# Patient Record
Sex: FEMALE | Race: WHITE | NOT HISPANIC OR LATINO | Employment: PART TIME | URBAN - METROPOLITAN AREA
[De-identification: names, ages, dates, MRNs, and addresses within clinical notes are randomized per-mention and may not be internally consistent; named-entity substitution may affect disease eponyms.]

---

## 2020-07-02 ENCOUNTER — TELEPHONE (OUTPATIENT)
Dept: ORTHOPEDICS | Facility: CLINIC | Age: 55
End: 2020-07-02

## 2020-07-02 DIAGNOSIS — M25.512 LEFT SHOULDER PAIN, UNSPECIFIED CHRONICITY: Primary | ICD-10-CM

## 2020-07-02 NOTE — TELEPHONE ENCOUNTER
Attempted to call patient on both numbers. Listed Mobile number is a wrong number and the Home number continuously rang and hung up.

## 2020-07-13 ENCOUNTER — TELEPHONE (OUTPATIENT)
Dept: ORTHOPEDICS | Facility: CLINIC | Age: 55
End: 2020-07-13

## 2020-07-13 NOTE — TELEPHONE ENCOUNTER
Called pt to reschedule her apt, I also reminded her to come early for her xr apt, pt understood.          ----- Message from Ambrose Huddleston sent at 7/13/2020  8:24 AM CDT -----  Regarding: [phone]  Pt would like a call back in regards to rescheduling her appt. Please call back at [phone].         Thank You,   Ambrose Huddleston

## 2020-08-17 ENCOUNTER — OFFICE VISIT (OUTPATIENT)
Dept: ORTHOPEDICS | Facility: CLINIC | Age: 55
End: 2020-08-17
Payer: MEDICAID

## 2020-08-17 ENCOUNTER — HOSPITAL ENCOUNTER (OUTPATIENT)
Dept: RADIOLOGY | Facility: HOSPITAL | Age: 55
Discharge: HOME | End: 2020-08-17
Attending: ORTHOPAEDIC SURGERY
Payer: MEDICAID

## 2020-08-17 VITALS
DIASTOLIC BLOOD PRESSURE: 81 MMHG | BODY MASS INDEX: 21 KG/M2 | WEIGHT: 123 LBS | HEIGHT: 64 IN | SYSTOLIC BLOOD PRESSURE: 117 MMHG | HEART RATE: 81 BPM

## 2020-08-17 DIAGNOSIS — M19.012 GLENOHUMERAL ARTHRITIS, LEFT: Primary | ICD-10-CM

## 2020-08-17 DIAGNOSIS — M25.512 LEFT SHOULDER PAIN, UNSPECIFIED CHRONICITY: ICD-10-CM

## 2020-08-17 DIAGNOSIS — M54.12 CERVICAL RADICULOPATHY: ICD-10-CM

## 2020-08-17 DIAGNOSIS — M54.12 CERVICAL RADICULOPATHY: Primary | ICD-10-CM

## 2020-08-17 PROCEDURE — 73030 X-RAY EXAM OF SHOULDER: CPT | Mod: TC,LT

## 2020-08-17 PROCEDURE — 99213 OFFICE O/P EST LOW 20 MIN: CPT | Mod: PBBFAC,25 | Performed by: ORTHOPAEDIC SURGERY

## 2020-08-17 PROCEDURE — 99999 PR PBB SHADOW E&M-EST. PATIENT-LVL III: CPT | Mod: PBBFAC,,, | Performed by: ORTHOPAEDIC SURGERY

## 2020-08-17 PROCEDURE — 99999 PR PBB SHADOW E&M-EST. PATIENT-LVL III: ICD-10-PCS | Mod: PBBFAC,,, | Performed by: ORTHOPAEDIC SURGERY

## 2020-08-17 PROCEDURE — 99203 PR OFFICE/OUTPT VISIT, NEW, LEVL III, 30-44 MIN: ICD-10-PCS | Mod: S$PBB,,, | Performed by: ORTHOPAEDIC SURGERY

## 2020-08-17 PROCEDURE — 73030 XR SHOULDER COMPLETE 2 OR MORE VIEWS LEFT: ICD-10-PCS | Mod: 26,LT,, | Performed by: RADIOLOGY

## 2020-08-17 PROCEDURE — 99203 OFFICE O/P NEW LOW 30 MIN: CPT | Mod: S$PBB,,, | Performed by: ORTHOPAEDIC SURGERY

## 2020-08-17 PROCEDURE — 73030 X-RAY EXAM OF SHOULDER: CPT | Mod: 26,LT,, | Performed by: RADIOLOGY

## 2020-08-17 RX ORDER — MOMETASONE FUROATE 50 UG/1
2 SPRAY, METERED NASAL
COMMUNITY
End: 2021-01-14 | Stop reason: CLARIF

## 2020-08-17 RX ORDER — LIDOCAINE AND PRILOCAINE 25; 25 MG/G; MG/G
CREAM TOPICAL
COMMUNITY
Start: 2016-05-27
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
## 2020-08-17 NOTE — PROGRESS NOTES
Patient ID: Radha Ramcahandran  YOB: 1965  MRN: [id number]    Chief Complaint: No chief complaint on file.    Referred By: Started looking up doctors.    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    Shoulder Pain   The pain is present in the left shoulder. The pain radiates to the left neck, right neck, left arm, left forearm and left hand. The current episode started more than 1 month ago. The problem occurs constantly. The problem has been gradually worsening. The quality of the pain is described as sharp and shooting. The pain is at a severity of 8/10. Associated symptoms include joint swelling, a limited range of motion, numbness and tingling. Pertinent negatives include no fever or itching. The symptoms are aggravated by lifting, activity, exercise, lying down, bearing weight and rotation. She has tried NSAIDs and OTC pain meds for the symptoms. The treatment provided mild relief. Physical therapy was not tried.      Past Medical History:   No past medical history on file.  No past surgical history on file.  No family history on file.  Social History     Socioeconomic History    Marital status: Single     Spouse name: Not on file    Number of children: Not on file    Years of education: Not on file    Highest education level: Not on file   Occupational History    Not on file   Social Needs    Financial resource strain: Not on file    Food insecurity     Worry: Not on file     Inability: Not on file    Transportation needs     Medical: Not on file     Non-medical: Not on file   Tobacco Use    Smoking status: Not on file   Substance and Sexual Activity    Alcohol use: Not on file    Drug use: Not on file    Sexual activity: Not on file   Lifestyle    Physical activity     Days per week: Not on file     Minutes per session: Not on file    Stress: Not on file   Relationships    Social  connections     Talks on phone: Not on file     Gets together: Not on file     Attends Religion service: Not on file     Active member of club or organization: Not on file     Attends meetings of clubs or organizations: Not on file     Relationship status: Not on file   Other Topics Concern    Not on file   Social History Narrative    Not on file       Review of patient's allergies indicates:  Not on File  Review of Systems   Constitution: Negative for fever.   HENT: Negative for sore throat.    Eyes: Negative for blurred vision.   Cardiovascular: Negative for dyspnea on exertion.   Respiratory: Negative for shortness of breath.    Hematologic/Lymphatic: Does not bruise/bleed easily.   Skin: Negative for itching.   Musculoskeletal: Positive for joint pain, joint swelling and neck pain.   Gastrointestinal: Negative for vomiting.   Genitourinary: Negative for dysuria.   Neurological: Positive for numbness and tingling. Negative for dizziness.   Psychiatric/Behavioral: The patient does not have insomnia.        Physical Exam:   There is no height or weight on file to calculate BMI.  There were no vitals filed for this visit.   GENERAL: Well appearing, appropriate for stated age, no acute distress.  CARDIOVASCULAR: Pulses regular by peripheral palpation.  PULMONARY: Respirations are even and non-labored.  NEURO: Awake, alert, and oriented x 3.  PSYCH: Mood & affect are appropriate.  HEENT: Head is normocephalic and atraumatic.  Ortho/SPM Exam  ***    Imaging:    No image results found.    ***  Relevant imaging results reviewed and interpreted by me, discussed with the patient and / or family today. ***    Other Tests:     No other tests performed today.***    Assessment:  Radha Ramachandran is a 55 y.o. female ***   ***    No diagnosis found.     Plan:   ***   Treatment plan was developed with input from the patient/family, and they expressed understanding and agreement with the plan. All questions were answered  today.    Follow-up: *** or sooner if there are any problems between now and then.    Disclaimer: This note was prepared using a voice recognition system and is likely to have sound alike errors within the text.
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
## 2020-08-19 ENCOUNTER — PROCEDURE VISIT (OUTPATIENT)
Dept: ORTHOPEDICS | Facility: CLINIC | Age: 55
End: 2020-08-19
Payer: MEDICAID

## 2020-08-19 VITALS
HEART RATE: 76 BPM | WEIGHT: 123 LBS | BODY MASS INDEX: 21 KG/M2 | SYSTOLIC BLOOD PRESSURE: 121 MMHG | DIASTOLIC BLOOD PRESSURE: 82 MMHG | HEIGHT: 64 IN

## 2020-08-19 DIAGNOSIS — M19.012 GLENOHUMERAL ARTHRITIS, LEFT: Primary | ICD-10-CM

## 2020-08-19 PROCEDURE — 20611 LARGE JOINT ASPIRATION/INJECTION: L GLENOHUMERAL: ICD-10-PCS | Mod: S$PBB,LT,, | Performed by: ORTHOPAEDIC SURGERY

## 2020-08-19 PROCEDURE — 99499 NO LOS: ICD-10-PCS | Mod: S$PBB,,, | Performed by: ORTHOPAEDIC SURGERY

## 2020-08-19 PROCEDURE — 20611 DRAIN/INJ JOINT/BURSA W/US: CPT | Mod: PBBFAC,LT | Performed by: ORTHOPAEDIC SURGERY

## 2020-08-19 PROCEDURE — 99499 UNLISTED E&M SERVICE: CPT | Mod: S$PBB,,, | Performed by: ORTHOPAEDIC SURGERY

## 2020-08-19 RX ADMIN — METHYLPREDNISOLONE ACETATE 80 MG: 80 INJECTION, SUSPENSION INTRALESIONAL; INTRAMUSCULAR; INTRASYNOVIAL; SOFT TISSUE at 09:08

## 2020-08-19 NOTE — LETTER
August 19, 2020      [facility]  [address]  [place] [address]  Phone: [phone]  Fax: [phone]       Patient: Radha Ramachandran   YOB: 1965  Date of Visit: 08/19/2020    To Whom It May Concern:    Alonzo Ramachandran  was at [facility] on 08/19/2020.  Patient will be unable to do any heavy lifting and/or overhead lifting until 08/20/2020.  Please excuse from work on 08/19/2020 due to appt.     If you have any questions or concerns, or if I can be of further assistance, please do not hesitate to contact me.    Sincerely,    Toi Bradford MD // Torrie Metz MA

## 2020-08-20 RX ORDER — METHYLPREDNISOLONE ACETATE 80 MG/ML
80 INJECTION, SUSPENSION INTRA-ARTICULAR; INTRALESIONAL; INTRAMUSCULAR; SOFT TISSUE
Status: DISCONTINUED | OUTPATIENT
Start: 2020-08-19 | End: 2020-08-20 | Stop reason: HOSPADM

## 2020-08-20 NOTE — PROCEDURES
Large Joint Aspiration/Injection: L glenohumeral    Date/Time: 8/19/2020 9:00 AM  Performed by: Toi Bradford MD  Authorized by: Toi Bradford MD     Consent Done?:  Yes (Verbal)  Indications:  Diagnostic evaluation and pain  Site marked: the procedure site was marked    Timeout: prior to procedure the correct patient, procedure, and site was verified    Prep: patient was prepped and draped in usual sterile fashion      Local anesthesia used?: Yes    Local anesthetic:  Bupivacaine 0.5% without epinephrine, lidocaine 1% without epinephrine and topical anesthetic    Details:  Needle Size:  20 G  Ultrasonic Guidance for needle placement?: Yes    Images are saved and documented.  Approach:  Posterior  Location:  Shoulder  Site:  L glenohumeral  Medications:  80 mg methylPREDNISolone acetate 80 mg/mL  Patient tolerance:  Patient tolerated the procedure well with no immediate complications     Ultrasound Guided  2cc 1% lidocaine plain, 2cc 0.5% marcaine plain, 1cc 80mg methylprednisolone    ULTRASOUND NOTE: Due to the complex nature of the anatomy, and the need to insure the needle was placed precisely at the desired anatomical location, this procedure was performed with the assistance of ultrasound guidance. Imaging demonstrating needle trajectory and placement was saved on the ultrasound device for documentation purposes.

## 2020-08-31 ENCOUNTER — OFFICE VISIT (OUTPATIENT)
Dept: ORTHOPEDICS | Facility: CLINIC | Age: 55
End: 2020-08-31
Payer: MEDICAID

## 2020-08-31 ENCOUNTER — HOSPITAL ENCOUNTER (OUTPATIENT)
Dept: RADIOLOGY | Facility: HOSPITAL | Age: 55
Discharge: HOME | End: 2020-08-31
Attending: ORTHOPAEDIC SURGERY
Payer: MEDICAID

## 2020-08-31 VITALS
HEART RATE: 75 BPM | HEIGHT: 64 IN | DIASTOLIC BLOOD PRESSURE: 85 MMHG | WEIGHT: 123 LBS | BODY MASS INDEX: 21 KG/M2 | SYSTOLIC BLOOD PRESSURE: 137 MMHG

## 2020-08-31 DIAGNOSIS — M54.12 RADICULOPATHY, CERVICAL REGION: ICD-10-CM

## 2020-08-31 DIAGNOSIS — G89.29 CHRONIC LEFT SHOULDER PAIN: ICD-10-CM

## 2020-08-31 DIAGNOSIS — M54.12 CERVICAL RADICULOPATHY: ICD-10-CM

## 2020-08-31 DIAGNOSIS — M54.12 CERVICAL RADICULOPATHY: Primary | ICD-10-CM

## 2020-08-31 DIAGNOSIS — M48.02 SPINAL STENOSIS, CERVICAL REGION: ICD-10-CM

## 2020-08-31 DIAGNOSIS — M25.512 CHRONIC LEFT SHOULDER PAIN: ICD-10-CM

## 2020-08-31 PROCEDURE — 99204 OFFICE O/P NEW MOD 45 MIN: CPT | Mod: S$PBB,,, | Performed by: PHYSICAL MEDICINE & REHABILITATION

## 2020-08-31 PROCEDURE — 72050 X-RAY EXAM NECK SPINE 4/5VWS: CPT | Mod: 26,,, | Performed by: RADIOLOGY

## 2020-08-31 PROCEDURE — 99204 PR OFFICE/OUTPT VISIT, NEW, LEVL IV, 45-59 MIN: ICD-10-PCS | Mod: S$PBB,,, | Performed by: PHYSICAL MEDICINE & REHABILITATION

## 2020-08-31 PROCEDURE — 99999 PR PBB SHADOW E&M-EST. PATIENT-LVL III: CPT | Mod: PBBFAC,,, | Performed by: PHYSICAL MEDICINE & REHABILITATION

## 2020-08-31 PROCEDURE — 99213 OFFICE O/P EST LOW 20 MIN: CPT | Mod: PBBFAC,25 | Performed by: PHYSICAL MEDICINE & REHABILITATION

## 2020-08-31 PROCEDURE — 72050 XR CERVICAL SPINE COMPLETE 5 VIEW: ICD-10-PCS | Mod: 26,,, | Performed by: RADIOLOGY

## 2020-08-31 PROCEDURE — 72050 X-RAY EXAM NECK SPINE 4/5VWS: CPT | Mod: TC

## 2020-08-31 PROCEDURE — 99999 PR PBB SHADOW E&M-EST. PATIENT-LVL III: ICD-10-PCS | Mod: PBBFAC,,, | Performed by: PHYSICAL MEDICINE & REHABILITATION

## 2020-08-31 NOTE — LETTER
August 31, 2020      Toi Bradford MD  [address]  [place] [address]           [facility]  [address]  [place] [address]  Phone: [phone]  Fax: [phone]          Patient: Radha Ramachandran   MR Number: [id number]   YOB: 1965   Date of Visit: 8/31/2020       Dear Dr. Toi Bradford:    Thank you for referring Radha Ramachandran to me for evaluation. Attached you will find relevant portions of my assessment and plan of care.    If you have questions, please do not hesitate to call me. I look forward to following Radha Ramachandran along with you.    Sincerely,    Soledad Bowers MD    Enclosure  CC:  No Recipients    If you would like to receive this communication electronically, please contact [email or web address] or [phone] to request more information on [email or web address] access.    For providers and/or their staff who would like to refer a patient to [facility], please contact us through our one-stop-shop provider referral line, [facility], at [phone].    If you feel you have received this communication in error or would no longer like to receive these types of communications, please e-mail [email or web address]

## 2020-08-31 NOTE — PROGRESS NOTES
SPORTS MEDICINE / PM&R New Patient Visit :    Referring Physician: Toi Bradford MD    Chief Complaint   Patient presents with    Spine - Pain       HPI: This is a 55 y.o.  female being seen in clinic today for evaluation of Pain of the Spine   The problem first began 2-3 years she stares she her neck has been bothering her. She states she has been going down from her neck to her hand. She states its worse on the left side. She feels sharp, shooting, aching pain in her cervical spine. She has not been to therapy. She has tried an US guided injections by Dr. Toi Bradford on 8/19/2020, for the shoulder without improvement. She has done warm water and at home exercise. She has numbness / tingling in bilateral hands and feet, but worse in left hand. She is left hand dominant. Feels a lot of weakness in hand as well. 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 Denies bowel / bladder changes.     History obtained from patient.    Past family, medical, social, surgical history, and vital signs reviewed in chart.    Review of Systems   Constitutional: Negative for chills, fever and weight loss.   HENT: Negative for hearing loss and sore throat.    Eyes: Negative for blurred vision, photophobia and pain.   Respiratory: Negative for shortness of breath.    Cardiovascular: Negative for chest pain.   Gastrointestinal: Negative for abdominal pain.   Genitourinary: Negative for dysuria.   Skin: Negative for rash.   Neurological: Negative for tingling and headaches.   Endo/Heme/Allergies: Does not bruise/bleed easily.   Psychiatric/Behavioral: Negative for depression.       General    Nursing note and vitals reviewed.  Constitutional: She is oriented to person, place, and time. She appears well-developed and well-nourished.   HENT:   Head: Normocephalic and atraumatic.   Eyes: Conjunctivae and EOM are normal. Pupils are equal, round, and reactive to light.   Neck: Neck supple.   Cardiovascular: Intact  distal pulses.    Pulmonary/Chest: Effort normal. No respiratory distress.   Abdominal: She exhibits no distension.   Neurological: She is alert and oriented to person, place, and time.   Psychiatric: She has a normal mood and affect.     General Musculoskeletal Exam   Gait: normal     Back (L-Spine & T-Spine) / Neck (C-Spine) Exam     Tenderness   The patient is experiencing no tenderness in the right right trapezial and left trapezial. Posterior midline palpation reveals tenderness of the Occ, Upper C-Spine and Lower C-Spine. Right paramedian tenderness of the Lower C-Spine. Left paramedian tenderness of the Lower C-Spine and Upper C-Spine.     Neck (C-Spine) Range of Motion   Flexion:     Normal  Extension: Normal  Right Lateral Bend: abnormalNeck lateral bend right: p!  Left Lateral Bend: normal  Right Rotation: normal  Left Rotation: abnormalNeck rotation left: p!    Spinal Sensation   Right Side Sensation  C-Spine Level: normal   Left Side Sensation  C-Spine Level: normal    Neck (C-Spine) Tests   Spurling's Test   Left:  positive  Right: positive    Other She has no scoliosis .  Spinal Kyphosis:  Absent  Head Tilt:  Negative      Muscle Strength   Right Upper Extremity   Biceps: 5/5/5   Deltoid:  5/5  Triceps:  5/5  Wrist extension: 5/5/5   Wrist flexion: 5/5/5   Finger Flexors:  5/5  Finger Extensors:  5/5  Left Upper Extremity  Biceps: 5/5/5   Deltoid:  5/5  Triceps:  5/5  Wrist extension: 5/5/5   Wrist flexion: 5/5/5   Finger Flexors:  5/5  Finger Extensors:  5/5    Reflexes     Left Side  Biceps:  1+  Brachioradialis:  1+  Achilles:  1+  Left David's Sign:  Absent  Quadriceps:  1+    Right Side   Biceps:  1+  Brachioradialis:  1+  Achilles:  1+  Right David's Sign:  absent  Quadriceps:  1+    Vascular Exam     Right Pulses      Radial:                    2+      Left Pulses      Radial:                    2+        Narrative & Impression     EXAMINATION:  XR CERVICAL SPINE COMPLETE 5 VIEW     CLINICAL  HISTORY:  . Radiculopathy, cervical region     TECHNIQUE:  AP, Lateral, bilateral oblique and open mouth views of the cervical spine were performed.     COMPARISON:  None     FINDINGS:  The vertebral bodies demonstrate a normal height.  There is grade 1 spondylolisthesis of C3 on C4 and C4 on C5.  There is also grade 1 spondylolisthesis of C7 on T1 and T1 on T2. There is at least moderate disc space narrowing and spondylosis present at the C4-5 through the C6-7 levels.  Prominent bilateral facet arthropathy noted at the C2-3 through the C4-5 levels bilaterally.  Minimal osseous encroachment at the C5-6 level on the right secondary to uncovertebral joint hypertrophy.  On the left there appears to be significant osseous encroachment noted upon the C3-4 level that appears to be multifactorial and to a lesser degree the C4-5 level as well..     Impression:     As above        Electronically signed by: Ricky Bardales DO  Date:                                            08/31/2020  Time:                                           08:27            IMPRESSION/PLAN: This is a 55 y.o.  female with:    Cervical radiculopathy    Chronic left shoulder pain    Radiculopathy, cervical region  -     MRI Cervical Spine Without Contrast; Future; Expected date: 08/31/2020    Spinal stenosis, cervical region  -     MRI Cervical Spine Without Contrast; Future; Expected date: 08/31/2020        The findings were discussed with Radha in detail. Her x-rays are worrisome with the multiple levels of anterolisthesis, but fortunately exam shows no evidence of myelopathy. Her symptoms are more consistent with left cervical radiculopathy. At this point I recommend MRI to look for possible surgical lesion vs planning for interventional procedure. I don't recommend therapy until we see MRI. May need referral to NSGY first.  She was provided with this plan in writing. All of her questions were answered. We'll be in touch after MRI.     Soledad Bowers,  M.D.  Sports Medicine

## 2020-09-11 ENCOUNTER — TELEPHONE (OUTPATIENT)
Dept: ORTHOPEDICS | Facility: CLINIC | Age: 55
End: 2020-09-11

## 2020-09-11 NOTE — TELEPHONE ENCOUNTER
----- Message from Angela Franco sent at 9/11/2020 10:58 AM CDT -----  Contact: Radha Garcia would like a call back at [phone], Regards to her MRI was denied by her insurance and she wants to know what other option she has.    Thanks  Td

## 2020-09-14 DIAGNOSIS — G89.29 CHRONIC LEFT SHOULDER PAIN: ICD-10-CM

## 2020-09-14 DIAGNOSIS — M25.512 CHRONIC LEFT SHOULDER PAIN: ICD-10-CM

## 2020-09-14 DIAGNOSIS — M54.12 CERVICAL RADICULOPATHY: Primary | ICD-10-CM

## 2020-10-05 ENCOUNTER — TELEPHONE (OUTPATIENT)
Dept: ORTHOPEDICS | Facility: CLINIC | Age: 55
End: 2020-10-05

## 2020-10-05 NOTE — TELEPHONE ENCOUNTER
----- Message from Evelyn Reyes sent at 10/5/2020  7:08 AM CDT -----  Regarding: Appt  Contact: [phone]  Would like to consult with the nurse, patient would like to Reschedule her Appt, patient would like a call back concerning this,  patient states that she is still having some pain please call back thanks sj

## 2020-10-16 ENCOUNTER — CLINICAL SUPPORT (OUTPATIENT)
Dept: REHABILITATION | Facility: HOSPITAL | Age: 55
End: 2020-10-16
Attending: PHYSICAL MEDICINE & REHABILITATION
Payer: MEDICAID

## 2020-10-16 DIAGNOSIS — M25.512 CHRONIC LEFT SHOULDER PAIN: ICD-10-CM

## 2020-10-16 DIAGNOSIS — G89.29 CHRONIC LEFT SHOULDER PAIN: ICD-10-CM

## 2020-10-16 DIAGNOSIS — M25.511 CHRONIC RIGHT SHOULDER PAIN: ICD-10-CM

## 2020-10-16 DIAGNOSIS — G89.29 CHRONIC RIGHT SHOULDER PAIN: ICD-10-CM

## 2020-10-16 DIAGNOSIS — M54.12 CERVICAL RADICULOPATHY: ICD-10-CM

## 2020-10-16 PROCEDURE — 97162 PT EVAL MOD COMPLEX 30 MIN: CPT | Mod: PO

## 2020-10-16 PROCEDURE — 97110 THERAPEUTIC EXERCISES: CPT | Mod: PO
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
## 2020-11-03 ENCOUNTER — TELEPHONE (OUTPATIENT)
Dept: ORTHOPEDICS | Facility: CLINIC | Age: 55
End: 2020-11-03

## 2020-11-03 NOTE — TELEPHONE ENCOUNTER
Spoke to patient in regards to MRI. She informed me that she has finally started physical therapy. Informed her that the MRI was denied due to not having physical therapy and now that she has started, once we get those notes we could resubmit them to her insurance.Heriberto verbalized understanding.         ----- Message from Alla Ackerman sent at 11/3/2020 12:10 PM CST -----  Regarding: MRI approval  Type:  Needs Medical Advice    Who Called: pt  Symptoms (please be specific): n/a   How long has patient had these symptoms:  n/a  Pharmacy name and phone #:  n/a  Would the patient rather a call back or a response via [facility]? Call back   Best Call Back Number: [phone]  Additional Information: Please call back.Thanks

## 2020-11-05 ENCOUNTER — CLINICAL SUPPORT (OUTPATIENT)
Dept: REHABILITATION | Facility: HOSPITAL | Age: 55
End: 2020-11-05
Attending: PHYSICAL MEDICINE & REHABILITATION
Payer: MEDICAID

## 2020-11-05 DIAGNOSIS — M25.512 ACUTE PAIN OF LEFT SHOULDER: ICD-10-CM

## 2020-11-05 DIAGNOSIS — M54.12 CERVICAL RADICULOPATHY: Primary | ICD-10-CM

## 2020-11-05 PROCEDURE — 97140 MANUAL THERAPY 1/> REGIONS: CPT | Mod: PO

## 2020-11-05 PROCEDURE — 97110 THERAPEUTIC EXERCISES: CPT | Mod: PO

## 2020-11-05 NOTE — PROGRESS NOTES
Physical Therapy Daily Treatment Note     Name: Radha Ramachandran  Clinic Number: [id number]    Therapy Diagnosis:   Encounter Diagnoses   Name Primary?    Cervical radiculopathy Yes    Acute pain of left shoulder      Physician: Soledad Bowers MD    Visit Date: 11/5/2020   Physician Orders: PT Eval and Treat :Please schedule for PT at [facility] in [place]. Eval and treat for cervical radiculopathy and shoulder pain. Gentle traction and mobilization if indicated, but no HVLA mobilization.      Medical Diagnosis from Referral: M54.12 (ICD-10-CM) - Cervical radiculopathy M25.512,G89.29 (ICD-10-CM) - Chronic left shoulder pain      Evaluation Date: 10/16/2020  Authorization Period Expiration: 10/22/2020  Plan of Care Expiration: 12/11/2020  Visit # / Visits authorized: 1/ 12     Time In: 0755  Time Out: 0840  Total Billable Time: 405minutes     Precautions: Standard      Subjective     Pt reports: decreased neck pain after last visit but still having about the same pain and symptoms along with N/T in left UE and hand. Her shoulder and arm are still hurting a lot.    She was compliant with home exercise program.  Response to previous treatment: decreased tightness  Functional change: none to date    Pain: 8/10  Location: left arms and neck      Objective     Radah received therapeutic exercises to develop strength, endurance, ROM and flexibility for 30 minutes including:  Passive cervical ROM: 3 min  Passive shoulder ROM: 5 min  Low rows: yellow therband x 10  Shoulder pull downs, yellow therband x 10  Supine: manual resisted internal rotation x 10  Supine : manual resisted external rotation x 10   Seated scapular retraction x 10     Radha received the following manual therapy techniques: Joint mobilizations, Manual traction and Soft tissue Mobilization were applied to the: cervical spine and left shoulder for 10 minutes, including:  central PA mobs: Grade II-IV, C2-T1  Lateral glides mobs: , Grade II-IV, C2-T1  Manual  cervical traction     Soft Tissue Palpation Assessment to determine the necessity for Functional Dry Needling  .   Patient provided written and verbal consent to FDN.   FDN performed to left upper trap with electrical stimulation    Home Exercises Provided and Patient Education Provided     Education provided:   - importance of trying to keep it moving at home and HEP    Written Home Exercises Provided: Patient instructed to cont prior HEP.  Exercises were reviewed and Radha was able to demonstrate them prior to the end of the session.  Radha demonstrated good  understanding of the education provided.     See EMR under Patient Instructions for exercises provided prior visit.    Assessment     Patient continues to be guarded and have left shoulder and neck pain that has not changed since last visit. We started some low level exercises today that were difficult for her per self reported pain wise but she did well with them. She is still restricted due to pain with ROM of her shoulder and it was difficult to work with ROM due to her guarding and reported pain. Her neck pain is pretty isolated to left upper trap area.       Radha is progressing well towards her goals.   Pt prognosis is Fair.     Pt will continue to benefit from skilled outpatient physical therapy to address the deficits listed in the problem list box on initial evaluation, provide pt/family education and to maximize pt's level of independence in the home and community environment.     Pt's spiritual, cultural and educational needs considered and pt agreeable to plan of care and goals.    Anticipated barriers to physical therapy:   Goals:  Short Term Goals: 3-4 weeks   Decrease LUE Numbness/tingling by 25% or more , no changes   Decrease pain in LUE with ADLs by 2 points or more, no changes  Increase cervical Left cervical rotation to right, no changes     Long Term Goals: 8 weeks   Decrease pain with ADLs by 4 points or more, no changes  Decrease N/T by  50% or more, no changes  Increase LUE strength to 4+/5, no changes  Decrease FOTO by 10% or more, no changes        Plan    Plan of care Certification: 10/16/2020 to 12/11/2020  Outpatient physical therapy 2 times weekly to include: pt ed, HEP, therapeutic exercises, therapeutic activities, neuromuscular re-education/ balance exercises, manual therapy, and modalities prn. Cont PT for 2 months.      Boris Alonzo, PT

## 2020-11-10 NOTE — PROGRESS NOTES
Physical Therapy Daily Treatment Note     Name: Radha Ramachandran  Clinic Number: [id number]    Therapy Diagnosis:   Encounter Diagnoses   Name Primary?    Cervical radiculopathy Yes    Acute pain of left shoulder      Physician: Soledad Bowers MD    Visit Date: 11/11/2020   Physician Orders: PT Eval and Treat :Please schedule for PT at [facility] in [place]. Eval and treat for cervical radiculopathy and shoulder pain. Gentle traction and mobilization if indicated, but no HVLA mobilization.      Medical Diagnosis from Referral: M54.12 (ICD-10-CM) - Cervical radiculopathy M25.512,G89.29 (ICD-10-CM) - Chronic left shoulder pain      Evaluation Date: 10/16/2020  Authorization Period Expiration: 10/22/2020  Plan of Care Expiration: 12/11/2020  Visit # / Visits authorized: 2/ 12     Time In: 0800  Time Out: 0840  Total Billable Time: 40minutes     Precautions: Standard      Subjective     Pt reports: she is still having a lot of pain in her left side of her neck and in her shoulder going down her arm. She does the exercises the best she can but reports the shoulder feels like it is going to pop out. She hasn't had any change in her pain but she can rotate her neck a little better. She is due to see ortho tomorrow.     She was compliant with home exercise program.  Response to previous treatment: decreased tightness  Functional change: none to date    Pain: 8/10  Location: left arms and neck      Objective     Radha received therapeutic exercises to develop strength, endurance, ROM and flexibility for 30 minutes including:  Passive cervical ROM: 3 min  Passive shoulder ROM: 5 min  Low rows: yellow therband x 10  Isometrics: Abd, IR/ER/Add x 5 sec holds  sidelying shoulder Abd x 8  Shoulder extension: yellow therband x 10  Supine: manual resisted internal rotation x 10  Supine : manual resisted external rotation x 10   Seated scapular retraction x 10     Radha received the following manual therapy techniques: Joint  mobilizations, Manual traction and Soft tissue Mobilization were applied to the: cervical spine and left shoulder for 10 minutes, including:  central PA mobs: Grade II-IV, C2-T1  Lateral glides mobs: , Grade II-IV, C2-T1  Manual cervical traction     Home Exercises Provided and Patient Education Provided     Education provided:   - importance of trying to keep it moving at home and HEP  - added shoulder isometrics    Written Home Exercises Provided: Patient instructed to cont prior HEP.  Exercises were reviewed and Radha was able to demonstrate them prior to the end of the session.  Radha demonstrated good  understanding of the education provided.     See EMR under Patient Instructions for exercises provided prior visit.    Assessment     Patient continues to be very guarded and reporting significant  pain during therapy that limits what we can do at this time. I have tried to work on some low level exercises for neck and shoulder but her tolerance to any movement is minimal. It is difficult. Her work schedule limits when she can come in so she has been only able to come in once a week.     Radha is not  progressing well towards her goals.   Pt prognosis is Fair.     Pt will continue to benefit from skilled outpatient physical therapy to address the deficits listed in the problem list box on initial evaluation, provide pt/family education and to maximize pt's level of independence in the home and community environment.     Pt's spiritual, cultural and educational needs considered and pt agreeable to plan of care and goals.    Anticipated barriers to physical therapy:   Goals:  Short Term Goals: 3-4 weeks   Decrease LUE Numbness/tingling by 25% or more , no changes   Decrease pain in LUE with ADLs by 2 points or more, no changes  Increase cervical Left cervical rotation to right, no changes     Long Term Goals: 8 weeks   Decrease pain with ADLs by 4 points or more, no changes  Decrease N/T by 50% or more, no  changes  Increase LUE strength to 4+/5, no changes  Decrease FOTO by 10% or more, no changes        Plan    Plan of care Certification: 10/16/2020 to 12/11/2020  Outpatient physical therapy 2 times weekly to include: pt ed, HEP, therapeutic exercises, therapeutic activities, neuromuscular re-education/ balance exercises, manual therapy, and modalities prn. Cont PT for 2 months.      Boirs Alonzo, PT

## 2020-11-11 ENCOUNTER — CLINICAL SUPPORT (OUTPATIENT)
Dept: REHABILITATION | Facility: HOSPITAL | Age: 55
End: 2020-11-11
Attending: PHYSICAL MEDICINE & REHABILITATION
Payer: MEDICAID

## 2020-11-11 DIAGNOSIS — M25.512 ACUTE PAIN OF LEFT SHOULDER: ICD-10-CM

## 2020-11-11 DIAGNOSIS — M54.12 CERVICAL RADICULOPATHY: Primary | ICD-10-CM

## 2020-11-11 PROCEDURE — 97110 THERAPEUTIC EXERCISES: CPT | Mod: PO

## 2020-11-12 ENCOUNTER — OFFICE VISIT (OUTPATIENT)
Dept: ORTHOPEDICS | Facility: CLINIC | Age: 55
End: 2020-11-12
Payer: MEDICAID

## 2020-11-12 VITALS
SYSTOLIC BLOOD PRESSURE: 134 MMHG | BODY MASS INDEX: 21 KG/M2 | HEART RATE: 78 BPM | WEIGHT: 123 LBS | HEIGHT: 64 IN | DIASTOLIC BLOOD PRESSURE: 87 MMHG

## 2020-11-12 DIAGNOSIS — M48.02 SPINAL STENOSIS, CERVICAL REGION: ICD-10-CM

## 2020-11-12 DIAGNOSIS — M25.512 CHRONIC LEFT SHOULDER PAIN: ICD-10-CM

## 2020-11-12 DIAGNOSIS — M54.12 CERVICAL RADICULOPATHY: Primary | ICD-10-CM

## 2020-11-12 DIAGNOSIS — G89.29 CHRONIC LEFT SHOULDER PAIN: ICD-10-CM

## 2020-11-12 DIAGNOSIS — M54.12 RADICULOPATHY, CERVICAL REGION: ICD-10-CM

## 2020-11-12 DIAGNOSIS — M19.012 GLENOHUMERAL ARTHRITIS, LEFT: ICD-10-CM

## 2020-11-12 PROCEDURE — 99214 OFFICE O/P EST MOD 30 MIN: CPT | Mod: S$PBB,,, | Performed by: ORTHOPAEDIC SURGERY

## 2020-11-12 PROCEDURE — 99213 OFFICE O/P EST LOW 20 MIN: CPT | Mod: PBBFAC | Performed by: ORTHOPAEDIC SURGERY

## 2020-11-12 PROCEDURE — 99999 PR PBB SHADOW E&M-EST. PATIENT-LVL III: CPT | Mod: PBBFAC,,, | Performed by: ORTHOPAEDIC SURGERY

## 2020-11-12 PROCEDURE — 99214 PR OFFICE/OUTPT VISIT, EST, LEVL IV, 30-39 MIN: ICD-10-PCS | Mod: S$PBB,,, | Performed by: ORTHOPAEDIC SURGERY

## 2020-11-12 PROCEDURE — 99999 PR PBB SHADOW E&M-EST. PATIENT-LVL III: ICD-10-PCS | Mod: PBBFAC,,, | Performed by: ORTHOPAEDIC SURGERY

## 2020-11-12 RX ORDER — FLUTICASONE PROPIONATE 50 MCG
1 SPRAY, SUSPENSION (ML) NASAL DAILY
COMMUNITY

## 2020-11-12 RX ORDER — LEVOCETIRIZINE DIHYDROCHLORIDE 5 MG/1
5 TABLET, FILM COATED ORAL NIGHTLY
COMMUNITY
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
## 2020-11-16 ENCOUNTER — PATIENT MESSAGE (OUTPATIENT)
Dept: ORTHOPEDICS | Facility: CLINIC | Age: 55
End: 2020-11-16

## 2020-11-17 ENCOUNTER — TELEPHONE (OUTPATIENT)
Dept: ORTHOPEDICS | Facility: CLINIC | Age: 55
End: 2020-11-17

## 2020-11-17 ENCOUNTER — PROCEDURE VISIT (OUTPATIENT)
Dept: ORTHOPEDICS | Facility: CLINIC | Age: 55
End: 2020-11-17
Payer: MEDICAID

## 2020-11-17 VITALS
BODY MASS INDEX: 21 KG/M2 | HEIGHT: 64 IN | DIASTOLIC BLOOD PRESSURE: 85 MMHG | HEART RATE: 66 BPM | WEIGHT: 123 LBS | SYSTOLIC BLOOD PRESSURE: 143 MMHG

## 2020-11-17 DIAGNOSIS — M54.12 CERVICAL RADICULOPATHY: ICD-10-CM

## 2020-11-17 DIAGNOSIS — G89.29 CHRONIC LEFT SHOULDER PAIN: ICD-10-CM

## 2020-11-17 DIAGNOSIS — M54.12 RADICULOPATHY, CERVICAL REGION: ICD-10-CM

## 2020-11-17 DIAGNOSIS — M25.512 CHRONIC LEFT SHOULDER PAIN: ICD-10-CM

## 2020-11-17 PROCEDURE — 95911 NRV CNDJ TEST 9-10 STUDIES: CPT | Mod: PBBFAC | Performed by: PHYSICAL MEDICINE & REHABILITATION

## 2020-11-17 PROCEDURE — 95886 MUSC TEST DONE W/N TEST COMP: CPT | Mod: 26,S$PBB,LT, | Performed by: PHYSICAL MEDICINE & REHABILITATION

## 2020-11-17 PROCEDURE — 99499 NO LOS: ICD-10-PCS | Mod: S$PBB,,, | Performed by: PHYSICAL MEDICINE & REHABILITATION

## 2020-11-17 PROCEDURE — 95886 MUSC TEST DONE W/N TEST COMP: CPT | Mod: PBBFAC,50 | Performed by: PHYSICAL MEDICINE & REHABILITATION

## 2020-11-17 PROCEDURE — 95911 NRV CNDJ TEST 9-10 STUDIES: CPT | Mod: 26,S$PBB,, | Performed by: PHYSICAL MEDICINE & REHABILITATION

## 2020-11-17 PROCEDURE — 99499 UNLISTED E&M SERVICE: CPT | Mod: S$PBB,,, | Performed by: PHYSICAL MEDICINE & REHABILITATION

## 2020-11-17 PROCEDURE — 95886 PR EMG COMPLETE, W/ NERVE CONDUCTION STUDIES, 5+ MUSCLES: ICD-10-PCS | Mod: 26,S$PBB,LT, | Performed by: PHYSICAL MEDICINE & REHABILITATION

## 2020-11-17 PROCEDURE — 95911 PR NERVE CONDUCTION STUDY; 9-10 STUDIES: ICD-10-PCS | Mod: 26,S$PBB,, | Performed by: PHYSICAL MEDICINE & REHABILITATION

## 2020-11-17 NOTE — PROGRESS NOTES
SPORTS MEDICINE / PM&R ELECTRODIAGNOSTIC HISTORY & PHYSICAL:    Full Name: Radha Ramachandran Gender: Female  Patient ID: [id number] YOB: 1965      Visit Date: 11/17/2020 07:47  Age: 55 Years 4 Months Old  Examining Physician: Soledad Bowers M.D.  Referring Physician: Dr. Toi Bradford  Height: 5 feet 4 inch  Reason for Referral: Eval for left cervical radiculopathy    HPI: This is a 55 y.o.  female being seen in clinic today for evaluation of Numbness and Pain of the Right Wrist and Numbness and Pain of the Left Wrist   She is seen as a consult from Dr. Toi Bradford and they will receive these results electronically.     The problem first began 2-3 years she stares she her neck has been bothering her. She states she has been going down from her neck to her hand. She states its worse on the left side. She feels sharp, shooting, aching pain in her cervical spine. She has not been to therapy. She has tried an US guided injections by Dr. Toi Bradford on 8/19/2020, for the shoulder without improvement. She has done warm water and at home exercise. She has numbness / tingling in bilateral hands and feet, but worse in left hand. She is left hand dominant. Feels a lot of weakness in hand as well. 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 Denies bowel / bladder changes.       History obtained from patient.    Past family, medical, social, surgical history, and vital signs reviewed in chart.    Review of Systems   Constitutional: Negative for chills, fever and weight loss.   HENT: Negative for hearing loss and sore throat.    Eyes: Negative for blurred vision, photophobia and pain.   Respiratory: Negative for shortness of breath.    Cardiovascular: Negative for chest pain.   Gastrointestinal: Negative for abdominal pain.   Genitourinary: Negative for dysuria.   Musculoskeletal: Positive for joint pain.   Skin: Negative for rash.   Neurological: Negative for tingling and headaches.    Endo/Heme/Allergies: Does not bruise/bleed easily.   Psychiatric/Behavioral: Negative for depression.       General    Nursing note and vitals reviewed.  Constitutional: She is oriented to person, place, and time. She appears well-developed and well-nourished.   HENT:   Head: Normocephalic and atraumatic.   Eyes: Conjunctivae and EOM are normal. Pupils are equal, round, and reactive to light.   Neck: Neck supple.   Cardiovascular: Intact distal pulses.    Pulmonary/Chest: Effort normal. No respiratory distress.   Abdominal: She exhibits no distension.   Neurological: She is alert and oriented to person, place, and time.   Psychiatric: She has a normal mood and affect.     General Musculoskeletal Exam   Gait: normal     Back (L-Spine & T-Spine) / Neck (C-Spine) Exam     Tenderness   The patient is experiencing no tenderness in the right right trapezial and left trapezial. Posterior midline palpation reveals tenderness of the Occ, Upper C-Spine and Lower C-Spine. Right paramedian tenderness of the Lower C-Spine. Left paramedian tenderness of the Lower C-Spine and Upper C-Spine.     Neck (C-Spine) Range of Motion   Flexion:     Normal  Extension: Normal  Right Lateral Bend: abnormalNeck lateral bend right: p!  Left Lateral Bend: normal  Right Rotation: normal  Left Rotation: abnormalNeck rotation left: p!    Spinal Sensation   Right Side Sensation  C-Spine Level: normal   Left Side Sensation  C-Spine Level: normal    Neck (C-Spine) Tests   Spurling's Test   Left:  positive  Right: positive    Other She has no scoliosis .  Spinal Kyphosis:  Absent  Head Tilt:  Negative      Muscle Strength   Right Upper Extremity   Biceps: 5/5   Deltoid:  5/5  Triceps:  5/5  Wrist extension: 5/5   Wrist flexion: 5/5   Finger Flexors:  5/5  Finger Extensors:  5/5  Left Upper Extremity  Biceps: 5/5   Deltoid:  5/5  Triceps:  5/5  Wrist extension: 5/5   Wrist flexion: 5/5   Finger Flexors:  5/5  Finger Extensors:  5/5    Reflexes      Left Side  Biceps:  1+  Brachioradialis:  1+  Achilles:  1+  Left David's Sign:  Absent  Quadriceps:  1+    Right Side   Biceps:  1+  Brachioradialis:  1+  Achilles:  1+  Right David's Sign:  absent  Quadriceps:  1+    Vascular Exam     Right Pulses      Radial:                    2+      Left Pulses      Radial:                    2+            Findings:    CSI      Nerve / Sites Rec. Site Peak Lat NP Amp Segments Peak Diff     ms µV  ms   L Median - CSI      Median palm Wrist 2.7 14.7        Ulnar palm Wrist 2.7 14.3        CSI    CSI 0.0   R Median - CSI      Median palm Wrist 2.1 21.6        Ulnar palm Wrist 2.1 16.7        CSI    CSI 0.0       SNC      Nerve / Sites Rec. Site Onset Lat Peak Lat Amp Segments Distance Velocity     ms ms µV  mm m/s   L Radial - Anatomical snuff box (Forearm)      Forearm Wrist 1.8 2.7 18.0 Forearm - Wrist 100 55   R Radial - Anatomical snuff box (Forearm)      Forearm Wrist 1.9 2.6 15.1 Forearm - Wrist 100 53       MNC      Nerve / Sites Muscle Latency Amplitude Duration Rel Amp Segments Distance Lat Diff Velocity     ms mV ms %  mm ms m/s   L Median - APB      Wrist APB 3.8 7.6 6.8 100 Wrist - APB 80        Elbow APB 7.2 6.6 6.9 86.7 Elbow - Wrist 183 3.4 53   R Median - APB      Wrist APB 3.5 6.0 5.7 100 Wrist - APB 80     L Ulnar - ADM      Wrist ADM 3.0 9.4 6.4 100 Wrist - ADM 80        B.Elbow ADM 6.4 8.7 6.7 92.1 B.Elbow - Wrist 183 3.4 53      A.Elbow ADM 8.2 8.3 5.7 95.6 A.Elbow - B.Elbow 100 1.8 56         A.Elbow - Wrist  5.2    R Ulnar - ADM      Wrist ADM 2.9 9.4 6.5 100 Wrist - ADM 80        B.Elbow ADM 5.9 8.5 6.4 90.9 B.Elbow - Wrist 185 3.0 61         A.Elbow - Wrist          EMG         EMG Summary Table     Spontaneous MUAP Recruitment   Muscle IA Fib PSW Fasc Other Dur. Dur Amp Dur Polys Pattern Effort   L. First dorsal interosseous N None None None . _NFT_ _NFT_ N N N N .   L. Extensor indicis proprius N None None None . _NFT_ _NFT_ N N N N .   L.  Pronator teres N None None None . _NFT_ _NFT_ N N N N .   L. Biceps brachii Incr None 1+ None . _NFT_ _NFT_ N N N Reduced .   L. Triceps brachii N None None None . _NFT_ _NFT_ N N N N .   L. Cervical paraspinals N None None None . _NFT_ _NFT_            Results:    - The bilateral median motor and sensory responses were normal.  - The bilateral ulnar motor and sensory responses were normal.   - The bilateral superficial radial sensory responses were normal.  - Needle EMG examination of the left upper extremity and cervical paraspinals was normal.     Interpretation:    1.  Essentially normal electrodiagnostic examination except for minimal evidence of denervation in the left biceps brachii.  This finding is of questionable clinical significance, but could possibly be from mild left C5 or C6 radiculopathy. Consider correlation with advanced imaging.    2. No evidence of bilateral median, ulnar, or radial neuropathy.       IMPRESSION/PLAN: Radha is a 55 y.o.  female with:    1. Cervical radiculopathy  - Nerve conduction test    2. Chronic left shoulder pain  - Nerve conduction test    3. Radiculopathy, cervical region  - Nerve conduction test       The findings were discussed with Radha in detail.  We discussed that it is not uncommon to find isolated denervation potentials on EMG that do not necessarily corresponding to nerve lesions.  However, I recommend she keep her MRI appointment this weekend to further evaluate the cervical spine.  All of her questions were answered. She will follow-up with Dr. Toi Bradford for further evaluation and management.    Disclaimer: This note was prepared using a voice recognition system and is likely to have sound alike errors within the text.     Soledad Bowers M.D.  Sports Medicine / PM&R

## 2020-12-02 ENCOUNTER — HOSPITAL ENCOUNTER (OUTPATIENT)
Dept: RADIOLOGY | Facility: HOSPITAL | Age: 55
Discharge: HOME | End: 2020-12-02
Attending: ORTHOPAEDIC SURGERY
Payer: MEDICAID

## 2020-12-02 DIAGNOSIS — M54.12 RADICULOPATHY, CERVICAL REGION: ICD-10-CM

## 2020-12-02 PROCEDURE — 72141 MRI NECK SPINE W/O DYE: CPT | Mod: 26,,, | Performed by: RADIOLOGY

## 2020-12-02 PROCEDURE — 72141 MRI CERVICAL SPINE WITHOUT CONTRAST: ICD-10-PCS | Mod: 26,,, | Performed by: RADIOLOGY

## 2020-12-02 PROCEDURE — 72141 MRI NECK SPINE W/O DYE: CPT | Mod: TC

## 2020-12-03 ENCOUNTER — TELEPHONE (OUTPATIENT)
Dept: ORTHOPEDICS | Facility: CLINIC | Age: 55
End: 2020-12-03

## 2020-12-03 DIAGNOSIS — M54.12 CERVICAL RADICULOPATHY: Primary | ICD-10-CM

## 2020-12-03 DIAGNOSIS — R93.89 ABNORMAL FINDING ON IMAGING: ICD-10-CM

## 2020-12-03 DIAGNOSIS — M48.02 SPINAL STENOSIS, CERVICAL REGION: ICD-10-CM

## 2020-12-04 ENCOUNTER — TELEPHONE (OUTPATIENT)
Dept: ORTHOPEDICS | Facility: CLINIC | Age: 55
End: 2020-12-04

## 2020-12-04 NOTE — TELEPHONE ENCOUNTER
Spoke with patient scheduling her chest CT. Informed patient to get disc with images after appt so she can bring them to her PCP. Pt verbalized understanding.

## 2020-12-15 ENCOUNTER — HOSPITAL ENCOUNTER (OUTPATIENT)
Dept: RADIOLOGY | Facility: HOSPITAL | Age: 55
Discharge: HOME | End: 2020-12-15
Attending: ORTHOPAEDIC SURGERY
Payer: MEDICAID

## 2020-12-15 ENCOUNTER — OFFICE VISIT (OUTPATIENT)
Dept: NEUROSURGERY | Facility: CLINIC | Age: 55
End: 2020-12-15
Payer: MEDICAID

## 2020-12-15 VITALS
HEIGHT: 64 IN | SYSTOLIC BLOOD PRESSURE: 131 MMHG | DIASTOLIC BLOOD PRESSURE: 82 MMHG | WEIGHT: 130.63 LBS | HEART RATE: 85 BPM | RESPIRATION RATE: 18 BRPM | BODY MASS INDEX: 22.3 KG/M2

## 2020-12-15 DIAGNOSIS — M48.02 SPINAL STENOSIS, CERVICAL REGION: ICD-10-CM

## 2020-12-15 DIAGNOSIS — M54.12 CERVICAL RADICULOPATHY: ICD-10-CM

## 2020-12-15 DIAGNOSIS — M19.112 POST-TRAUMATIC OSTEOARTHRITIS OF LEFT SHOULDER: Primary | Chronic | ICD-10-CM

## 2020-12-15 DIAGNOSIS — R93.89 ABNORMAL FINDING ON IMAGING: ICD-10-CM

## 2020-12-15 PROCEDURE — 71250 CT THORAX DX C-: CPT | Mod: 26,,, | Performed by: RADIOLOGY

## 2020-12-15 PROCEDURE — 99202 PR OFFICE/OUTPT VISIT, NEW, LEVL II, 15-29 MIN: ICD-10-PCS | Mod: S$PBB,,, | Performed by: NEUROLOGICAL SURGERY

## 2020-12-15 PROCEDURE — 99999 PR PBB SHADOW E&M-EST. PATIENT-LVL IV: ICD-10-PCS | Mod: PBBFAC,,, | Performed by: NEUROLOGICAL SURGERY

## 2020-12-15 PROCEDURE — 99999 PR PBB SHADOW E&M-EST. PATIENT-LVL IV: CPT | Mod: PBBFAC,,, | Performed by: NEUROLOGICAL SURGERY

## 2020-12-15 PROCEDURE — 99202 OFFICE O/P NEW SF 15 MIN: CPT | Mod: S$PBB,,, | Performed by: NEUROLOGICAL SURGERY

## 2020-12-15 PROCEDURE — 71250 CT THORAX DX C-: CPT | Mod: TC

## 2020-12-15 PROCEDURE — 99214 OFFICE O/P EST MOD 30 MIN: CPT | Mod: PBBFAC,25,PO | Performed by: NEUROLOGICAL SURGERY

## 2020-12-15 PROCEDURE — 71250 CT CHEST WITHOUT CONTRAST: ICD-10-PCS | Mod: 26,,, | Performed by: RADIOLOGY
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
## 2020-12-15 NOTE — LETTER
December 15, 2020      Toi Bradford MD  [address]  [place] [address]           [facility]  [id number] [place] [address]  Phone: [phone]  Fax: [phone]          Patient: Radha Ramachandran   MR Number: [id number]   YOB: 1965   Date of Visit: 12/15/2020       Dear Dr. Toi Bradford:    Thank you for referring Radha Ramachandran to me for evaluation. Attached you will find relevant portions of my assessment and plan of care.    If you have questions, please do not hesitate to call me. I look forward to following Radha Ramachandran along with you.    Sincerely,    Chaparro Rooney MD    Enclosure  CC:  No Recipients    If you would like to receive this communication electronically, please contact [email or web address] or [phone] to request more information on [facility] Link access.    For providers and/or their staff who would like to refer a patient to [facility], please contact us through our one-stop-shop provider referral line, [facility], at [phone].    If you feel you have received this communication in error or would no longer like to receive these types of communications, please e-mail [email or web address]

## 2021-01-05 ENCOUNTER — OFFICE VISIT (OUTPATIENT)
Dept: ORTHOPEDICS | Facility: CLINIC | Age: 56
End: 2021-01-05
Payer: MEDICAID

## 2021-01-05 ENCOUNTER — HOSPITAL ENCOUNTER (OUTPATIENT)
Dept: RADIOLOGY | Facility: HOSPITAL | Age: 56
Discharge: HOME | End: 2021-01-05
Attending: STUDENT IN AN ORGANIZED HEALTH CARE EDUCATION/TRAINING PROGRAM
Payer: MEDICAID

## 2021-01-05 VITALS
DIASTOLIC BLOOD PRESSURE: 74 MMHG | HEIGHT: 64 IN | WEIGHT: 130 LBS | HEART RATE: 74 BPM | BODY MASS INDEX: 22.2 KG/M2 | SYSTOLIC BLOOD PRESSURE: 137 MMHG

## 2021-01-05 DIAGNOSIS — M19.012 GLENOHUMERAL ARTHRITIS, LEFT: ICD-10-CM

## 2021-01-05 DIAGNOSIS — M19.012 GLENOHUMERAL ARTHRITIS, LEFT: Primary | ICD-10-CM

## 2021-01-05 PROCEDURE — 99999 PR PBB SHADOW E&M-EST. PATIENT-LVL V: CPT | Mod: PBBFAC,,, | Performed by: STUDENT IN AN ORGANIZED HEALTH CARE EDUCATION/TRAINING PROGRAM

## 2021-01-05 PROCEDURE — 73200 CT SHOULDER WITHOUT CONTRAST LEFT: ICD-10-PCS | Mod: 26,LT,, | Performed by: RADIOLOGY

## 2021-01-05 PROCEDURE — 99215 OFFICE O/P EST HI 40 MIN: CPT | Mod: PBBFAC,25 | Performed by: STUDENT IN AN ORGANIZED HEALTH CARE EDUCATION/TRAINING PROGRAM

## 2021-01-05 PROCEDURE — 73200 CT UPPER EXTREMITY W/O DYE: CPT | Mod: TC,LT

## 2021-01-05 PROCEDURE — 73200 CT UPPER EXTREMITY W/O DYE: CPT | Mod: 26,LT,, | Performed by: RADIOLOGY

## 2021-01-05 PROCEDURE — 99999 PR PBB SHADOW E&M-EST. PATIENT-LVL V: ICD-10-PCS | Mod: PBBFAC,,, | Performed by: STUDENT IN AN ORGANIZED HEALTH CARE EDUCATION/TRAINING PROGRAM

## 2021-01-05 PROCEDURE — 99214 PR OFFICE/OUTPT VISIT, EST, LEVL IV, 30-39 MIN: ICD-10-PCS | Mod: S$PBB,,, | Performed by: STUDENT IN AN ORGANIZED HEALTH CARE EDUCATION/TRAINING PROGRAM

## 2021-01-05 PROCEDURE — 99214 OFFICE O/P EST MOD 30 MIN: CPT | Mod: S$PBB,,, | Performed by: STUDENT IN AN ORGANIZED HEALTH CARE EDUCATION/TRAINING PROGRAM

## 2021-01-06 ENCOUNTER — TELEPHONE (OUTPATIENT)
Dept: PREADMISSION TESTING | Facility: HOSPITAL | Age: 56
End: 2021-01-06

## 2021-01-06 DIAGNOSIS — Z01.818 PRE-OP TESTING: Primary | ICD-10-CM

## 2021-01-14 ENCOUNTER — TELEPHONE (OUTPATIENT)
Dept: PREADMISSION TESTING | Facility: HOSPITAL | Age: 56
End: 2021-01-14

## 2021-01-14 DIAGNOSIS — M19.012 GLENOHUMERAL ARTHRITIS, LEFT: Primary | ICD-10-CM

## 2021-01-15 ENCOUNTER — HOSPITAL ENCOUNTER (OUTPATIENT)
Dept: RADIOLOGY | Facility: HOSPITAL | Age: 56
Discharge: HOME | End: 2021-01-15
Attending: STUDENT IN AN ORGANIZED HEALTH CARE EDUCATION/TRAINING PROGRAM
Payer: MEDICAID

## 2021-01-15 DIAGNOSIS — M19.012 GLENOHUMERAL ARTHRITIS, LEFT: ICD-10-CM

## 2021-01-20 ENCOUNTER — ANESTHESIA EVENT (OUTPATIENT)
Dept: SURGERY | Facility: HOSPITAL | Age: 56
End: 2021-01-20
Payer: MEDICAID

## 2021-01-22 ENCOUNTER — LAB VISIT (OUTPATIENT)
Dept: LAB | Facility: HOSPITAL | Age: 56
End: 2021-01-22
Attending: FAMILY MEDICINE
Payer: MEDICAID

## 2021-01-22 ENCOUNTER — TELEPHONE (OUTPATIENT)
Dept: PREADMISSION TESTING | Facility: HOSPITAL | Age: 56
End: 2021-01-22

## 2021-01-22 DIAGNOSIS — Z01.818 PRE-OP TESTING: ICD-10-CM

## 2021-01-22 LAB
ALBUMIN SERPL BCP-MCNC: 3.7 G/DL (ref 3.5–5.2)
ALP SERPL-CCNC: 55 U/L (ref 55–135)
ALT SERPL W/O P-5'-P-CCNC: 13 U/L (ref 10–44)
ANION GAP SERPL CALC-SCNC: 8 MMOL/L (ref 8–16)
AST SERPL-CCNC: 22 U/L (ref 10–40)
BILIRUB SERPL-MCNC: 0.2 MG/DL (ref 0.1–1)
BUN SERPL-MCNC: 12 MG/DL (ref 6–20)
CALCIUM SERPL-MCNC: 9.1 MG/DL (ref 8.7–10.5)
CHLORIDE SERPL-SCNC: 107 MMOL/L (ref 95–110)
CO2 SERPL-SCNC: 28 MMOL/L (ref 23–29)
CREAT SERPL-MCNC: 0.8 MG/DL (ref 0.5–1.4)
EST. GFR  (AFRICAN AMERICAN): >60 ML/MIN/1.73 M^2
EST. GFR  (NON AFRICAN AMERICAN): >60 ML/MIN/1.73 M^2
GLUCOSE SERPL-MCNC: 92 MG/DL (ref 70–110)
POTASSIUM SERPL-SCNC: 4 MMOL/L (ref 3.5–5.1)
PROT SERPL-MCNC: 6.6 G/DL (ref 6–8.4)
SODIUM SERPL-SCNC: 143 MMOL/L (ref 136–145)

## 2021-01-22 PROCEDURE — 36415 COLL VENOUS BLD VENIPUNCTURE: CPT

## 2021-01-22 PROCEDURE — 80053 COMPREHEN METABOLIC PANEL: CPT

## 2021-01-25 ENCOUNTER — HOSPITAL ENCOUNTER (OUTPATIENT)
Dept: RADIOLOGY | Facility: HOSPITAL | Age: 56
Discharge: HOME | End: 2021-01-25
Attending: STUDENT IN AN ORGANIZED HEALTH CARE EDUCATION/TRAINING PROGRAM | Admitting: STUDENT IN AN ORGANIZED HEALTH CARE EDUCATION/TRAINING PROGRAM
Payer: MEDICAID

## 2021-01-25 ENCOUNTER — ANESTHESIA (OUTPATIENT)
Dept: SURGERY | Facility: HOSPITAL | Age: 56
End: 2021-01-25
Payer: MEDICAID

## 2021-01-25 ENCOUNTER — HOSPITAL ENCOUNTER (OUTPATIENT)
Facility: HOSPITAL | Age: 56
Discharge: HOME | End: 2021-01-25
Attending: STUDENT IN AN ORGANIZED HEALTH CARE EDUCATION/TRAINING PROGRAM | Admitting: STUDENT IN AN ORGANIZED HEALTH CARE EDUCATION/TRAINING PROGRAM
Payer: MEDICAID

## 2021-01-25 VITALS
RESPIRATION RATE: 20 BRPM | BODY MASS INDEX: 20.94 KG/M2 | SYSTOLIC BLOOD PRESSURE: 112 MMHG | WEIGHT: 130.31 LBS | HEIGHT: 66 IN | HEART RATE: 61 BPM | OXYGEN SATURATION: 100 % | DIASTOLIC BLOOD PRESSURE: 61 MMHG | TEMPERATURE: 98 F

## 2021-01-25 DIAGNOSIS — M19.012 ARTHRITIS OF LEFT GLENOHUMERAL JOINT: ICD-10-CM

## 2021-01-25 DIAGNOSIS — M19.012 GLENOHUMERAL ARTHRITIS, LEFT: Primary | ICD-10-CM

## 2021-01-25 PROCEDURE — 01638 ANES OPN/ARTHR TOT SHO RPLCM: CPT | Performed by: STUDENT IN AN ORGANIZED HEALTH CARE EDUCATION/TRAINING PROGRAM

## 2021-01-25 PROCEDURE — 25000003 PHARM REV CODE 250: Performed by: STUDENT IN AN ORGANIZED HEALTH CARE EDUCATION/TRAINING PROGRAM

## 2021-01-25 PROCEDURE — 76942 ECHO GUIDE FOR BIOPSY: CPT | Performed by: ANESTHESIOLOGY

## 2021-01-25 PROCEDURE — 25000003 PHARM REV CODE 250: Performed by: NURSE ANESTHETIST, CERTIFIED REGISTERED

## 2021-01-25 PROCEDURE — 76942 ECHO GUIDE FOR BIOPSY: CPT | Mod: 26,,, | Performed by: ANESTHESIOLOGY

## 2021-01-25 PROCEDURE — D9220A PRA ANESTHESIA: ICD-10-PCS | Mod: ANES,,, | Performed by: ANESTHESIOLOGY

## 2021-01-25 PROCEDURE — 63600175 PHARM REV CODE 636 W HCPCS: Performed by: NURSE ANESTHETIST, CERTIFIED REGISTERED

## 2021-01-25 PROCEDURE — 63600175 PHARM REV CODE 636 W HCPCS: Performed by: STUDENT IN AN ORGANIZED HEALTH CARE EDUCATION/TRAINING PROGRAM

## 2021-01-25 PROCEDURE — 64450 NJX AA&/STRD OTHER PN/BRANCH: CPT | Performed by: STUDENT IN AN ORGANIZED HEALTH CARE EDUCATION/TRAINING PROGRAM

## 2021-01-25 PROCEDURE — D9220A PRA ANESTHESIA: Mod: ANES,,, | Performed by: ANESTHESIOLOGY

## 2021-01-25 PROCEDURE — C1776 JOINT DEVICE (IMPLANTABLE): HCPCS | Performed by: STUDENT IN AN ORGANIZED HEALTH CARE EDUCATION/TRAINING PROGRAM

## 2021-01-25 PROCEDURE — 64415 NJX AA&/STRD BRCH PLXS IMG: CPT | Performed by: ANESTHESIOLOGY

## 2021-01-25 PROCEDURE — 64415 PR NERVE BLOCK INJ, ANES/STEROID, BRACHIAL PLEXUS, INCL IMAG GUIDANCE: ICD-10-PCS | Mod: 59,LT,, | Performed by: ANESTHESIOLOGY

## 2021-01-25 PROCEDURE — 23472 PR RECONSTR TOTAL SHOULDER IMPLANT: ICD-10-PCS | Mod: LT,,, | Performed by: STUDENT IN AN ORGANIZED HEALTH CARE EDUCATION/TRAINING PROGRAM

## 2021-01-25 PROCEDURE — 63600175 PHARM REV CODE 636 W HCPCS: Performed by: ANESTHESIOLOGY

## 2021-01-25 PROCEDURE — 73020 XR SHOULDER 1 VIEW LEFT: ICD-10-PCS | Mod: 26,LT,, | Performed by: RADIOLOGY

## 2021-01-25 PROCEDURE — 71000033 HC RECOVERY, INTIAL HOUR: Performed by: STUDENT IN AN ORGANIZED HEALTH CARE EDUCATION/TRAINING PROGRAM

## 2021-01-25 PROCEDURE — 37000009 HC ANESTHESIA EA ADD 15 MINS: Performed by: STUDENT IN AN ORGANIZED HEALTH CARE EDUCATION/TRAINING PROGRAM

## 2021-01-25 PROCEDURE — 36000710: Performed by: STUDENT IN AN ORGANIZED HEALTH CARE EDUCATION/TRAINING PROGRAM

## 2021-01-25 PROCEDURE — D9220A PRA ANESTHESIA: Mod: CRNA,,, | Performed by: NURSE ANESTHETIST, CERTIFIED REGISTERED

## 2021-01-25 PROCEDURE — 73020 X-RAY EXAM OF SHOULDER: CPT | Mod: 26,LT,, | Performed by: RADIOLOGY

## 2021-01-25 PROCEDURE — 71000015 HC POSTOP RECOV 1ST HR: Performed by: STUDENT IN AN ORGANIZED HEALTH CARE EDUCATION/TRAINING PROGRAM

## 2021-01-25 PROCEDURE — C1713 ANCHOR/SCREW BN/BN,TIS/BN: HCPCS | Performed by: STUDENT IN AN ORGANIZED HEALTH CARE EDUCATION/TRAINING PROGRAM

## 2021-01-25 PROCEDURE — 76942 PR U/S GUIDANCE FOR NEEDLE GUIDANCE: ICD-10-PCS | Mod: 26,,, | Performed by: ANESTHESIOLOGY

## 2021-01-25 PROCEDURE — D9220A PRA ANESTHESIA: ICD-10-PCS | Mod: CRNA,,, | Performed by: NURSE ANESTHETIST, CERTIFIED REGISTERED

## 2021-01-25 PROCEDURE — 25000003 PHARM REV CODE 250: Performed by: ANESTHESIOLOGY

## 2021-01-25 PROCEDURE — 23472 RECONSTRUCT SHOULDER JOINT: CPT | Mod: LT,,, | Performed by: STUDENT IN AN ORGANIZED HEALTH CARE EDUCATION/TRAINING PROGRAM

## 2021-01-25 PROCEDURE — 73020 X-RAY EXAM OF SHOULDER: CPT | Mod: TC,LT

## 2021-01-25 PROCEDURE — C9290 INJ, BUPIVACAINE LIPOSOME: HCPCS | Performed by: ANESTHESIOLOGY

## 2021-01-25 PROCEDURE — 64415 NJX AA&/STRD BRCH PLXS IMG: CPT | Mod: 59,LT,, | Performed by: ANESTHESIOLOGY

## 2021-01-25 PROCEDURE — 37000008 HC ANESTHESIA 1ST 15 MINUTES: Performed by: STUDENT IN AN ORGANIZED HEALTH CARE EDUCATION/TRAINING PROGRAM

## 2021-01-25 PROCEDURE — 27201423 OPTIME MED/SURG SUP & DEVICES STERILE SUPPLY: Performed by: STUDENT IN AN ORGANIZED HEALTH CARE EDUCATION/TRAINING PROGRAM

## 2021-01-25 PROCEDURE — 36000711: Performed by: STUDENT IN AN ORGANIZED HEALTH CARE EDUCATION/TRAINING PROGRAM

## 2021-01-25 DEVICE — CEMENT BONE SURG SMPLX P RADPQ: Type: IMPLANTABLE DEVICE | Site: SHOULDER | Status: FUNCTIONAL

## 2021-01-25 DEVICE — IMPLANTABLE DEVICE: Type: IMPLANTABLE DEVICE | Site: SHOULDER | Status: FUNCTIONAL

## 2021-01-25 RX ORDER — HYDROCODONE BITARTRATE AND ACETAMINOPHEN 5; 325 MG/1; MG/1
1 TABLET ORAL
Status: DISCONTINUED | OUTPATIENT
Start: 2021-01-25 | End: 2021-01-25 | Stop reason: HOSPADM

## 2021-01-25 RX ORDER — FENTANYL CITRATE 50 UG/ML
INJECTION, SOLUTION INTRAMUSCULAR; INTRAVENOUS
Status: DISCONTINUED | OUTPATIENT
Start: 2021-01-25 | End: 2021-01-25

## 2021-01-25 RX ORDER — PHENYLEPHRINE HYDROCHLORIDE 10 MG/ML
INJECTION INTRAVENOUS
Status: DISCONTINUED | OUTPATIENT
Start: 2021-01-25 | End: 2021-01-25

## 2021-01-25 RX ORDER — CEFAZOLIN SODIUM 2 G/50ML
2 SOLUTION INTRAVENOUS
Status: COMPLETED | OUTPATIENT
Start: 2021-01-25 | End: 2021-01-25

## 2021-01-25 RX ORDER — CEFAZOLIN SODIUM 1 G/50ML
SOLUTION INTRAVENOUS
Status: DISCONTINUED
Start: 2021-01-25 | End: 2021-01-25 | Stop reason: HOSPADM

## 2021-01-25 RX ORDER — NEOSTIGMINE METHYLSULFATE 1 MG/ML
INJECTION, SOLUTION INTRAVENOUS
Status: DISCONTINUED | OUTPATIENT
Start: 2021-01-25 | End: 2021-01-25

## 2021-01-25 RX ORDER — CHLORHEXIDINE GLUCONATE ORAL RINSE 1.2 MG/ML
10 SOLUTION DENTAL
Status: DISCONTINUED | OUTPATIENT
Start: 2021-01-25 | End: 2021-01-25 | Stop reason: HOSPADM

## 2021-01-25 RX ORDER — ASPIRIN 81 MG/1
81 TABLET ORAL 2 TIMES DAILY
Qty: 28 TABLET | Refills: 0 | Status: SHIPPED | OUTPATIENT
Start: 2021-01-25 | End: 2021-02-08

## 2021-01-25 RX ORDER — MIDAZOLAM HYDROCHLORIDE 1 MG/ML
INJECTION, SOLUTION INTRAMUSCULAR; INTRAVENOUS
Status: DISCONTINUED | OUTPATIENT
Start: 2021-01-25 | End: 2021-01-25

## 2021-01-25 RX ORDER — CEFAZOLIN SODIUM 1 G/50ML
1 SOLUTION INTRAVENOUS ONCE
Status: DISCONTINUED | OUTPATIENT
Start: 2021-01-25 | End: 2021-01-25

## 2021-01-25 RX ORDER — BUPIVACAINE HYDROCHLORIDE 5 MG/ML
INJECTION, SOLUTION EPIDURAL; INTRACAUDAL
Status: COMPLETED | OUTPATIENT
Start: 2021-01-25 | End: 2021-01-25

## 2021-01-25 RX ORDER — ROCURONIUM BROMIDE 10 MG/ML
INJECTION, SOLUTION INTRAVENOUS
Status: DISCONTINUED | OUTPATIENT
Start: 2021-01-25 | End: 2021-01-25

## 2021-01-25 RX ORDER — KETOROLAC TROMETHAMINE 10 MG/1
10 TABLET, FILM COATED ORAL EVERY 6 HOURS PRN
Qty: 12 TABLET | Refills: 0 | Status: SHIPPED | OUTPATIENT
Start: 2021-01-25 | End: 2021-01-28

## 2021-01-25 RX ORDER — MEPERIDINE HYDROCHLORIDE 25 MG/ML
12.5 INJECTION INTRAMUSCULAR; INTRAVENOUS; SUBCUTANEOUS ONCE
Status: DISCONTINUED | OUTPATIENT
Start: 2021-01-25 | End: 2021-01-25 | Stop reason: HOSPADM

## 2021-01-25 RX ORDER — DIPHENHYDRAMINE HYDROCHLORIDE 50 MG/ML
25 INJECTION INTRAMUSCULAR; INTRAVENOUS EVERY 6 HOURS PRN
Status: DISCONTINUED | OUTPATIENT
Start: 2021-01-25 | End: 2021-01-25 | Stop reason: HOSPADM

## 2021-01-25 RX ORDER — LIDOCAINE HYDROCHLORIDE 20 MG/ML
INJECTION, SOLUTION EPIDURAL; INFILTRATION; INTRACAUDAL; PERINEURAL
Status: DISCONTINUED | OUTPATIENT
Start: 2021-01-25 | End: 2021-01-25

## 2021-01-25 RX ORDER — SODIUM CHLORIDE 9 MG/ML
INJECTION, SOLUTION INTRAVENOUS CONTINUOUS
Status: DISCONTINUED | OUTPATIENT
Start: 2021-01-25 | End: 2021-01-25 | Stop reason: HOSPADM

## 2021-01-25 RX ORDER — SODIUM CHLORIDE, SODIUM LACTATE, POTASSIUM CHLORIDE, CALCIUM CHLORIDE 600; 310; 30; 20 MG/100ML; MG/100ML; MG/100ML; MG/100ML
INJECTION, SOLUTION INTRAVENOUS CONTINUOUS
Status: ACTIVE | OUTPATIENT
Start: 2021-01-25

## 2021-01-25 RX ORDER — OXYCODONE HYDROCHLORIDE 5 MG/1
5 TABLET ORAL EVERY 6 HOURS PRN
Qty: 42 TABLET | Refills: 0 | Status: ON HOLD | OUTPATIENT
Start: 2021-01-25 | End: 2023-02-09 | Stop reason: HOSPADM

## 2021-01-25 RX ORDER — PROPOFOL 10 MG/ML
VIAL (ML) INTRAVENOUS
Status: DISCONTINUED | OUTPATIENT
Start: 2021-01-25 | End: 2021-01-25

## 2021-01-25 RX ORDER — TRANEXAMIC ACID 100 MG/ML
INJECTION, SOLUTION INTRAVENOUS
Status: COMPLETED
Start: 2021-01-25 | End: 2021-01-25

## 2021-01-25 RX ORDER — ONDANSETRON 2 MG/ML
4 INJECTION INTRAMUSCULAR; INTRAVENOUS ONCE AS NEEDED
Status: DISCONTINUED | OUTPATIENT
Start: 2021-01-25 | End: 2021-01-25 | Stop reason: HOSPADM

## 2021-01-25 RX ORDER — ALBUTEROL SULFATE 0.83 MG/ML
2.5 SOLUTION RESPIRATORY (INHALATION) EVERY 4 HOURS PRN
Status: DISCONTINUED | OUTPATIENT
Start: 2021-01-25 | End: 2021-01-25 | Stop reason: HOSPADM

## 2021-01-25 RX ORDER — TRANEXAMIC ACID 100 MG/ML
1000 INJECTION, SOLUTION INTRAVENOUS
Status: COMPLETED | OUTPATIENT
Start: 2021-01-25 | End: 2021-01-25

## 2021-01-25 RX ORDER — DEXAMETHASONE SODIUM PHOSPHATE 4 MG/ML
INJECTION, SOLUTION INTRA-ARTICULAR; INTRALESIONAL; INTRAMUSCULAR; INTRAVENOUS; SOFT TISSUE
Status: DISCONTINUED | OUTPATIENT
Start: 2021-01-25 | End: 2021-01-25

## 2021-01-25 RX ORDER — FENTANYL CITRATE 50 UG/ML
25 INJECTION, SOLUTION INTRAMUSCULAR; INTRAVENOUS EVERY 5 MIN PRN
Status: DISCONTINUED | OUTPATIENT
Start: 2021-01-25 | End: 2021-01-25 | Stop reason: HOSPADM

## 2021-01-25 RX ORDER — ONDANSETRON 2 MG/ML
INJECTION INTRAMUSCULAR; INTRAVENOUS
Status: DISCONTINUED | OUTPATIENT
Start: 2021-01-25 | End: 2021-01-25

## 2021-01-25 RX ADMIN — MIDAZOLAM 1 MG: 1 INJECTION INTRAMUSCULAR; INTRAVENOUS at 11:01

## 2021-01-25 RX ADMIN — CEFAZOLIN SODIUM 2 G: 2 SOLUTION INTRAVENOUS at 12:01

## 2021-01-25 RX ADMIN — ROCURONIUM BROMIDE 30 MG: 10 INJECTION, SOLUTION INTRAVENOUS at 12:01

## 2021-01-25 RX ADMIN — PROPOFOL 40 MG: 10 INJECTION, EMULSION INTRAVENOUS at 02:01

## 2021-01-25 RX ADMIN — SODIUM CHLORIDE, SODIUM LACTATE, POTASSIUM CHLORIDE, AND CALCIUM CHLORIDE: 600; 310; 30; 20 INJECTION, SOLUTION INTRAVENOUS at 11:01

## 2021-01-25 RX ADMIN — SODIUM CHLORIDE, SODIUM LACTATE, POTASSIUM CHLORIDE, AND CALCIUM CHLORIDE: 600; 310; 30; 20 INJECTION, SOLUTION INTRAVENOUS at 02:01

## 2021-01-25 RX ADMIN — GLYCOPYRROLATE 0.6 MG: 0.2 INJECTION, SOLUTION INTRAMUSCULAR; INTRAVITREAL at 02:01

## 2021-01-25 RX ADMIN — PHENYLEPHRINE HYDROCHLORIDE 100 MCG: 10 INJECTION INTRAVENOUS at 01:01

## 2021-01-25 RX ADMIN — FENTANYL CITRATE 50 MCG: 50 INJECTION, SOLUTION INTRAMUSCULAR; INTRAVENOUS at 11:01

## 2021-01-25 RX ADMIN — BUPIVACAINE 133 MG: 13.3 INJECTION, SUSPENSION, LIPOSOMAL INFILTRATION at 11:01

## 2021-01-25 RX ADMIN — DEXAMETHASONE SODIUM PHOSPHATE 8 MG: 4 INJECTION, SOLUTION INTRA-ARTICULAR; INTRALESIONAL; INTRAMUSCULAR; INTRAVENOUS; SOFT TISSUE at 12:01

## 2021-01-25 RX ADMIN — ONDANSETRON 4 MG: 2 INJECTION INTRAMUSCULAR; INTRAVENOUS at 02:01

## 2021-01-25 RX ADMIN — PHENYLEPHRINE HYDROCHLORIDE 100 MCG: 10 INJECTION INTRAVENOUS at 02:01

## 2021-01-25 RX ADMIN — TRANEXAMIC ACID 1000 MG: 100 INJECTION, SOLUTION INTRAVENOUS at 12:01

## 2021-01-25 RX ADMIN — TRANEXAMIC ACID 1000 MG: 100 INJECTION, SOLUTION INTRAVENOUS at 02:01

## 2021-01-25 RX ADMIN — LIDOCAINE HYDROCHLORIDE 50 MG: 20 INJECTION, SOLUTION EPIDURAL; INFILTRATION; INTRACAUDAL; PERINEURAL at 12:01

## 2021-01-25 RX ADMIN — CEFAZOLIN SODIUM 1 G: 1 SOLUTION INTRAVENOUS at 03:01

## 2021-01-25 RX ADMIN — PHENYLEPHRINE HYDROCHLORIDE 100 MCG: 10 INJECTION INTRAVENOUS at 12:01

## 2021-01-25 RX ADMIN — BUPIVACAINE HYDROCHLORIDE 10 ML: 5 INJECTION, SOLUTION EPIDURAL; INTRACAUDAL; PERINEURAL at 11:01

## 2021-01-25 RX ADMIN — NEOSTIGMINE METHYLSULFATE 4 MG: 1 INJECTION INTRAVENOUS at 02:01

## 2021-01-25 RX ADMIN — ROCURONIUM BROMIDE 10 MG: 10 INJECTION, SOLUTION INTRAVENOUS at 01:01

## 2021-01-25 RX ADMIN — PROPOFOL 120 MG: 10 INJECTION, EMULSION INTRAVENOUS at 12:01

## 2021-01-27 DIAGNOSIS — Z96.612 S/P REVERSE TOTAL SHOULDER ARTHROPLASTY, LEFT: Primary | ICD-10-CM

## 2021-01-28 ENCOUNTER — TELEPHONE (OUTPATIENT)
Dept: ORTHOPEDICS | Facility: CLINIC | Age: 56
End: 2021-01-28

## 2021-01-28 DIAGNOSIS — Z96.612 S/P REVERSE TOTAL SHOULDER ARTHROPLASTY, LEFT: Primary | ICD-10-CM

## 2021-01-29 ENCOUNTER — TELEPHONE (OUTPATIENT)
Dept: ORTHOPEDICS | Facility: CLINIC | Age: 56
End: 2021-01-29

## 2021-01-29 DIAGNOSIS — Z96.612 S/P REVERSE TOTAL SHOULDER ARTHROPLASTY, LEFT: Primary | ICD-10-CM

## 2021-02-02 ENCOUNTER — NURSE TRIAGE (OUTPATIENT)
Dept: ADMINISTRATIVE | Facility: CLINIC | Age: 56
End: 2021-02-02

## 2021-02-02 RX ORDER — TRAMADOL HYDROCHLORIDE 50 MG/1
50 TABLET ORAL EVERY 6 HOURS PRN
Qty: 30 TABLET | Refills: 0 | Status: SHIPPED | OUTPATIENT
Start: 2021-02-02 | End: 2021-02-03

## 2021-02-03 RX ORDER — TRAMADOL HYDROCHLORIDE 50 MG/1
50 TABLET ORAL EVERY 6 HOURS PRN
Qty: 30 TABLET | Refills: 0 | Status: ON HOLD | OUTPATIENT
Start: 2021-02-03 | End: 2023-02-09 | Stop reason: HOSPADM

## 2021-02-05 ENCOUNTER — HOSPITAL ENCOUNTER (OUTPATIENT)
Dept: RADIOLOGY | Facility: HOSPITAL | Age: 56
Discharge: HOME | End: 2021-02-05
Attending: STUDENT IN AN ORGANIZED HEALTH CARE EDUCATION/TRAINING PROGRAM
Payer: MEDICAID

## 2021-02-05 ENCOUNTER — OFFICE VISIT (OUTPATIENT)
Dept: ORTHOPEDICS | Facility: CLINIC | Age: 56
End: 2021-02-05
Payer: MEDICAID

## 2021-02-05 VITALS — WEIGHT: 130 LBS | HEIGHT: 66 IN | BODY MASS INDEX: 20.89 KG/M2

## 2021-02-05 DIAGNOSIS — M19.012 GLENOHUMERAL ARTHRITIS, LEFT: Primary | ICD-10-CM

## 2021-02-05 DIAGNOSIS — Z96.612 S/P REVERSE TOTAL SHOULDER ARTHROPLASTY, LEFT: ICD-10-CM

## 2021-02-05 PROCEDURE — 73030 XR SHOULDER COMPLETE 2 OR MORE VIEWS LEFT: ICD-10-PCS | Mod: 26,LT,, | Performed by: RADIOLOGY

## 2021-02-05 PROCEDURE — 99999 PR PBB SHADOW E&M-EST. PATIENT-LVL III: CPT | Mod: PBBFAC,,, | Performed by: STUDENT IN AN ORGANIZED HEALTH CARE EDUCATION/TRAINING PROGRAM

## 2021-02-05 PROCEDURE — 99213 OFFICE O/P EST LOW 20 MIN: CPT | Mod: PBBFAC,25 | Performed by: STUDENT IN AN ORGANIZED HEALTH CARE EDUCATION/TRAINING PROGRAM

## 2021-02-05 PROCEDURE — 99024 PR POST-OP FOLLOW-UP VISIT: ICD-10-PCS | Mod: ,,, | Performed by: STUDENT IN AN ORGANIZED HEALTH CARE EDUCATION/TRAINING PROGRAM

## 2021-02-05 PROCEDURE — 73030 X-RAY EXAM OF SHOULDER: CPT | Mod: TC,LT

## 2021-02-05 PROCEDURE — 73030 X-RAY EXAM OF SHOULDER: CPT | Mod: 26,LT,, | Performed by: RADIOLOGY

## 2021-02-05 PROCEDURE — 99999 PR PBB SHADOW E&M-EST. PATIENT-LVL III: ICD-10-PCS | Mod: PBBFAC,,, | Performed by: STUDENT IN AN ORGANIZED HEALTH CARE EDUCATION/TRAINING PROGRAM

## 2021-02-05 PROCEDURE — 99024 POSTOP FOLLOW-UP VISIT: CPT | Mod: ,,, | Performed by: STUDENT IN AN ORGANIZED HEALTH CARE EDUCATION/TRAINING PROGRAM

## 2021-02-05 RX ORDER — IBUPROFEN 800 MG/1
800 TABLET ORAL 3 TIMES DAILY
Qty: 30 TABLET | Refills: 1 | Status: SHIPPED | OUTPATIENT
Start: 2021-02-05 | End: 2022-11-04 | Stop reason: ALTCHOICE

## 2021-02-08 ENCOUNTER — CLINICAL SUPPORT (OUTPATIENT)
Dept: REHABILITATION | Facility: HOSPITAL | Age: 56
End: 2021-02-08
Attending: STUDENT IN AN ORGANIZED HEALTH CARE EDUCATION/TRAINING PROGRAM
Payer: MEDICAID

## 2021-02-08 DIAGNOSIS — M25.612 STIFFNESS OF LEFT SHOULDER JOINT: ICD-10-CM

## 2021-02-08 DIAGNOSIS — R29.898 SHOULDER WEAKNESS: ICD-10-CM

## 2021-02-08 DIAGNOSIS — Z96.612 S/P REVERSE TOTAL SHOULDER ARTHROPLASTY, LEFT: ICD-10-CM

## 2021-02-08 PROCEDURE — 97110 THERAPEUTIC EXERCISES: CPT | Mod: PO

## 2021-02-08 PROCEDURE — 97162 PT EVAL MOD COMPLEX 30 MIN: CPT | Mod: PO

## 2021-02-17 RX ORDER — TRAMADOL HYDROCHLORIDE 50 MG/1
50 TABLET ORAL EVERY 6 HOURS PRN
Qty: 30 TABLET | Refills: 0 | Status: ON HOLD | OUTPATIENT
Start: 2021-02-17 | End: 2023-02-09 | Stop reason: HOSPADM

## 2021-02-26 ENCOUNTER — TELEPHONE (OUTPATIENT)
Dept: ORTHOPEDICS | Facility: CLINIC | Age: 56
End: 2021-02-26

## 2021-03-01 DIAGNOSIS — M79.602 LEFT ARM PAIN: Primary | ICD-10-CM

## 2021-03-04 ENCOUNTER — PATIENT MESSAGE (OUTPATIENT)
Dept: ORTHOPEDICS | Facility: CLINIC | Age: 56
End: 2021-03-04

## 2021-03-04 ENCOUNTER — CLINICAL SUPPORT (OUTPATIENT)
Dept: REHABILITATION | Facility: HOSPITAL | Age: 56
End: 2021-03-04
Attending: STUDENT IN AN ORGANIZED HEALTH CARE EDUCATION/TRAINING PROGRAM
Payer: MEDICAID

## 2021-03-04 DIAGNOSIS — R29.898 SHOULDER WEAKNESS: ICD-10-CM

## 2021-03-04 DIAGNOSIS — M25.612 STIFFNESS OF LEFT SHOULDER JOINT: ICD-10-CM

## 2021-03-04 PROCEDURE — 97110 THERAPEUTIC EXERCISES: CPT | Mod: PO

## 2021-03-05 ENCOUNTER — OFFICE VISIT (OUTPATIENT)
Dept: ORTHOPEDICS | Facility: CLINIC | Age: 56
End: 2021-03-05
Payer: MEDICAID

## 2021-03-05 ENCOUNTER — HOSPITAL ENCOUNTER (OUTPATIENT)
Dept: RADIOLOGY | Facility: HOSPITAL | Age: 56
Discharge: HOME | End: 2021-03-05
Attending: STUDENT IN AN ORGANIZED HEALTH CARE EDUCATION/TRAINING PROGRAM
Payer: MEDICAID

## 2021-03-05 VITALS — BODY MASS INDEX: 20.89 KG/M2 | HEIGHT: 66 IN | WEIGHT: 130 LBS

## 2021-03-05 DIAGNOSIS — M79.602 LEFT ARM PAIN: ICD-10-CM

## 2021-03-05 DIAGNOSIS — M19.012 GLENOHUMERAL ARTHRITIS, LEFT: Primary | ICD-10-CM

## 2021-03-05 PROCEDURE — 99213 OFFICE O/P EST LOW 20 MIN: CPT | Mod: PBBFAC,25 | Performed by: STUDENT IN AN ORGANIZED HEALTH CARE EDUCATION/TRAINING PROGRAM

## 2021-03-05 PROCEDURE — 73110 X-RAY EXAM OF WRIST: CPT | Mod: TC,LT

## 2021-03-05 PROCEDURE — 99024 PR POST-OP FOLLOW-UP VISIT: ICD-10-PCS | Mod: ,,, | Performed by: STUDENT IN AN ORGANIZED HEALTH CARE EDUCATION/TRAINING PROGRAM

## 2021-03-05 PROCEDURE — 73110 X-RAY EXAM OF WRIST: CPT | Mod: 26,LT,, | Performed by: RADIOLOGY

## 2021-03-05 PROCEDURE — 73090 XR FOREARM LEFT: ICD-10-PCS | Mod: 26,LT,, | Performed by: RADIOLOGY

## 2021-03-05 PROCEDURE — 73090 X-RAY EXAM OF FOREARM: CPT | Mod: TC,LT

## 2021-03-05 PROCEDURE — 73110 XR WRIST COMPLETE 3 VIEWS LEFT: ICD-10-PCS | Mod: 26,LT,, | Performed by: RADIOLOGY

## 2021-03-05 PROCEDURE — 99999 PR PBB SHADOW E&M-EST. PATIENT-LVL III: CPT | Mod: PBBFAC,,, | Performed by: STUDENT IN AN ORGANIZED HEALTH CARE EDUCATION/TRAINING PROGRAM

## 2021-03-05 PROCEDURE — 99999 PR PBB SHADOW E&M-EST. PATIENT-LVL III: ICD-10-PCS | Mod: PBBFAC,,, | Performed by: STUDENT IN AN ORGANIZED HEALTH CARE EDUCATION/TRAINING PROGRAM

## 2021-03-05 PROCEDURE — 73090 X-RAY EXAM OF FOREARM: CPT | Mod: 26,LT,, | Performed by: RADIOLOGY

## 2021-03-05 PROCEDURE — 99024 POSTOP FOLLOW-UP VISIT: CPT | Mod: ,,, | Performed by: STUDENT IN AN ORGANIZED HEALTH CARE EDUCATION/TRAINING PROGRAM

## 2021-03-11 ENCOUNTER — CLINICAL SUPPORT (OUTPATIENT)
Dept: REHABILITATION | Facility: HOSPITAL | Age: 56
End: 2021-03-11
Attending: STUDENT IN AN ORGANIZED HEALTH CARE EDUCATION/TRAINING PROGRAM
Payer: MEDICAID

## 2021-03-11 DIAGNOSIS — R29.898 SHOULDER WEAKNESS: ICD-10-CM

## 2021-03-11 DIAGNOSIS — M25.612 STIFFNESS OF LEFT SHOULDER JOINT: ICD-10-CM

## 2021-03-11 PROCEDURE — 97110 THERAPEUTIC EXERCISES: CPT | Mod: PO

## 2021-03-11 PROCEDURE — 97140 MANUAL THERAPY 1/> REGIONS: CPT | Mod: PO

## 2021-03-16 ENCOUNTER — TELEPHONE (OUTPATIENT)
Dept: PHYSICAL MEDICINE AND REHAB | Facility: CLINIC | Age: 56
End: 2021-03-16

## 2021-03-16 ENCOUNTER — OFFICE VISIT (OUTPATIENT)
Dept: ORTHOPEDICS | Facility: CLINIC | Age: 56
End: 2021-03-16
Payer: MEDICAID

## 2021-03-16 VITALS
BODY MASS INDEX: 20.9 KG/M2 | WEIGHT: 130.06 LBS | SYSTOLIC BLOOD PRESSURE: 114 MMHG | HEIGHT: 66 IN | HEART RATE: 88 BPM | DIASTOLIC BLOOD PRESSURE: 81 MMHG

## 2021-03-16 DIAGNOSIS — M54.12 RADICULOPATHY, CERVICAL REGION: ICD-10-CM

## 2021-03-16 DIAGNOSIS — G56.02 CARPAL TUNNEL SYNDROME OF LEFT WRIST: ICD-10-CM

## 2021-03-16 DIAGNOSIS — R20.0 NUMBNESS AND TINGLING: Primary | ICD-10-CM

## 2021-03-16 DIAGNOSIS — R20.2 NUMBNESS AND TINGLING: Primary | ICD-10-CM

## 2021-03-16 PROCEDURE — 99999 PR PBB SHADOW E&M-EST. PATIENT-LVL III: CPT | Mod: PBBFAC,,, | Performed by: ORTHOPAEDIC SURGERY

## 2021-03-16 PROCEDURE — 99213 OFFICE O/P EST LOW 20 MIN: CPT | Mod: PBBFAC | Performed by: ORTHOPAEDIC SURGERY

## 2021-03-16 PROCEDURE — 99999 PR PBB SHADOW E&M-EST. PATIENT-LVL III: ICD-10-PCS | Mod: PBBFAC,,, | Performed by: ORTHOPAEDIC SURGERY

## 2021-03-16 PROCEDURE — 99213 PR OFFICE/OUTPT VISIT, EST, LEVL III, 20-29 MIN: ICD-10-PCS | Mod: S$PBB,24,, | Performed by: ORTHOPAEDIC SURGERY

## 2021-03-16 PROCEDURE — 99213 OFFICE O/P EST LOW 20 MIN: CPT | Mod: S$PBB,24,, | Performed by: ORTHOPAEDIC SURGERY

## 2021-03-19 ENCOUNTER — OFFICE VISIT (OUTPATIENT)
Dept: PODIATRY | Facility: CLINIC | Age: 56
End: 2021-03-19
Payer: MEDICAID

## 2021-03-19 VITALS
HEIGHT: 66 IN | SYSTOLIC BLOOD PRESSURE: 123 MMHG | WEIGHT: 136.44 LBS | HEART RATE: 83 BPM | DIASTOLIC BLOOD PRESSURE: 85 MMHG | BODY MASS INDEX: 21.93 KG/M2

## 2021-03-19 DIAGNOSIS — M21.6X9 CAVUS FOOT, ACQUIRED: ICD-10-CM

## 2021-03-19 DIAGNOSIS — L84 CORN OR CALLUS: ICD-10-CM

## 2021-03-19 DIAGNOSIS — M54.10 CHRONIC RADICULAR PAIN OF LOWER EXTREMITY: Primary | ICD-10-CM

## 2021-03-19 DIAGNOSIS — G89.29 CHRONIC RADICULAR PAIN OF LOWER EXTREMITY: Primary | ICD-10-CM

## 2021-03-19 PROCEDURE — 99203 OFFICE O/P NEW LOW 30 MIN: CPT | Mod: S$PBB,,, | Performed by: PODIATRIST

## 2021-03-19 PROCEDURE — 99999 PR PBB SHADOW E&M-EST. PATIENT-LVL IV: ICD-10-PCS | Mod: PBBFAC,,, | Performed by: PODIATRIST

## 2021-03-19 PROCEDURE — 99999 PR PBB SHADOW E&M-EST. PATIENT-LVL IV: CPT | Mod: PBBFAC,,, | Performed by: PODIATRIST

## 2021-03-19 PROCEDURE — 99203 PR OFFICE/OUTPT VISIT, NEW, LEVL III, 30-44 MIN: ICD-10-PCS | Mod: S$PBB,,, | Performed by: PODIATRIST

## 2021-03-19 PROCEDURE — 99214 OFFICE O/P EST MOD 30 MIN: CPT | Mod: PBBFAC | Performed by: PODIATRIST

## 2021-03-19 RX ORDER — NITROFURANTOIN 25; 75 MG/1; MG/1
100 CAPSULE ORAL 2 TIMES DAILY
COMMUNITY
Start: 2021-03-16

## 2021-03-19 RX ORDER — CEFDINIR 300 MG/1
300 CAPSULE ORAL DAILY
COMMUNITY
Start: 2021-03-16

## 2021-03-24 ENCOUNTER — CLINICAL SUPPORT (OUTPATIENT)
Dept: REHABILITATION | Facility: HOSPITAL | Age: 56
End: 2021-03-24
Attending: STUDENT IN AN ORGANIZED HEALTH CARE EDUCATION/TRAINING PROGRAM
Payer: MEDICAID

## 2021-03-24 DIAGNOSIS — Z96.612 S/P REVERSE TOTAL SHOULDER ARTHROPLASTY, LEFT: Primary | ICD-10-CM

## 2021-03-24 DIAGNOSIS — M25.612 STIFFNESS OF LEFT SHOULDER JOINT: ICD-10-CM

## 2021-03-24 DIAGNOSIS — R29.898 SHOULDER WEAKNESS: ICD-10-CM

## 2021-03-24 PROCEDURE — 97110 THERAPEUTIC EXERCISES: CPT | Mod: PO

## 2021-03-25 ENCOUNTER — TELEPHONE (OUTPATIENT)
Dept: ORTHOPEDICS | Facility: CLINIC | Age: 56
End: 2021-03-25

## 2021-03-25 ENCOUNTER — PATIENT MESSAGE (OUTPATIENT)
Dept: ORTHOPEDICS | Facility: CLINIC | Age: 56
End: 2021-03-25

## 2021-03-25 RX ORDER — MELOXICAM 7.5 MG/1
7.5 TABLET ORAL DAILY
Qty: 30 TABLET | Refills: 2 | Status: SHIPPED | OUTPATIENT
Start: 2021-03-25 | End: 2021-06-01

## 2021-03-26 ENCOUNTER — OFFICE VISIT (OUTPATIENT)
Dept: PHYSICAL MEDICINE AND REHAB | Facility: CLINIC | Age: 56
End: 2021-03-26
Payer: MEDICAID

## 2021-03-26 VITALS
DIASTOLIC BLOOD PRESSURE: 84 MMHG | WEIGHT: 136 LBS | RESPIRATION RATE: 14 BRPM | BODY MASS INDEX: 21.86 KG/M2 | HEART RATE: 84 BPM | SYSTOLIC BLOOD PRESSURE: 124 MMHG | HEIGHT: 66 IN

## 2021-03-26 DIAGNOSIS — G56.02 CARPAL TUNNEL SYNDROME OF LEFT WRIST: ICD-10-CM

## 2021-03-26 DIAGNOSIS — M54.12 CERVICAL RADICULOPATHY: Primary | ICD-10-CM

## 2021-03-26 DIAGNOSIS — M54.16 LUMBAR RADICULOPATHY: ICD-10-CM

## 2021-03-26 PROCEDURE — 95913 PR NERVE CONDUCTION STUDY; 13 OR MORE STUDIES: ICD-10-PCS | Mod: 26,S$PBB,, | Performed by: PHYSICAL MEDICINE & REHABILITATION

## 2021-03-26 PROCEDURE — 95886 MUSC TEST DONE W/N TEST COMP: CPT | Mod: 26,S$PBB,, | Performed by: PHYSICAL MEDICINE & REHABILITATION

## 2021-03-26 PROCEDURE — 95913 NRV CNDJ TEST 13/> STUDIES: CPT | Mod: PBBFAC | Performed by: PHYSICAL MEDICINE & REHABILITATION

## 2021-03-26 PROCEDURE — 95886 MUSC TEST DONE W/N TEST COMP: CPT | Mod: PBBFAC | Performed by: PHYSICAL MEDICINE & REHABILITATION

## 2021-03-26 PROCEDURE — 95886 PR EMG COMPLETE, W/ NERVE CONDUCTION STUDIES, 5+ MUSCLES: ICD-10-PCS | Mod: 26,S$PBB,, | Performed by: PHYSICAL MEDICINE & REHABILITATION

## 2021-03-26 PROCEDURE — 99999 PR PBB SHADOW E&M-EST. PATIENT-LVL III: CPT | Mod: PBBFAC,,, | Performed by: PHYSICAL MEDICINE & REHABILITATION

## 2021-03-26 PROCEDURE — 95913 NRV CNDJ TEST 13/> STUDIES: CPT | Mod: 26,S$PBB,, | Performed by: PHYSICAL MEDICINE & REHABILITATION

## 2021-03-26 PROCEDURE — 99213 OFFICE O/P EST LOW 20 MIN: CPT | Mod: PBBFAC,25 | Performed by: PHYSICAL MEDICINE & REHABILITATION

## 2021-03-26 PROCEDURE — 99999 PR PBB SHADOW E&M-EST. PATIENT-LVL III: ICD-10-PCS | Mod: PBBFAC,,, | Performed by: PHYSICAL MEDICINE & REHABILITATION

## 2021-03-26 PROCEDURE — 99214 OFFICE O/P EST MOD 30 MIN: CPT | Mod: 25,S$PBB,, | Performed by: PHYSICAL MEDICINE & REHABILITATION

## 2021-03-26 PROCEDURE — 99214 PR OFFICE/OUTPT VISIT, EST, LEVL IV, 30-39 MIN: ICD-10-PCS | Mod: 25,S$PBB,, | Performed by: PHYSICAL MEDICINE & REHABILITATION

## 2021-04-01 ENCOUNTER — CLINICAL SUPPORT (OUTPATIENT)
Dept: REHABILITATION | Facility: HOSPITAL | Age: 56
End: 2021-04-01
Attending: STUDENT IN AN ORGANIZED HEALTH CARE EDUCATION/TRAINING PROGRAM
Payer: MEDICAID

## 2021-04-01 DIAGNOSIS — R29.898 SHOULDER WEAKNESS: ICD-10-CM

## 2021-04-01 DIAGNOSIS — M25.612 STIFFNESS OF LEFT SHOULDER JOINT: ICD-10-CM

## 2021-04-01 PROCEDURE — 97110 THERAPEUTIC EXERCISES: CPT | Mod: PO

## 2021-04-13 ENCOUNTER — OFFICE VISIT (OUTPATIENT)
Dept: ORTHOPEDICS | Facility: CLINIC | Age: 56
End: 2021-04-13
Payer: MEDICAID

## 2021-04-13 VITALS
BODY MASS INDEX: 21.86 KG/M2 | HEIGHT: 66 IN | DIASTOLIC BLOOD PRESSURE: 90 MMHG | HEART RATE: 77 BPM | SYSTOLIC BLOOD PRESSURE: 149 MMHG | WEIGHT: 136 LBS

## 2021-04-13 DIAGNOSIS — M54.12 CERVICAL RADICULOPATHY: Primary | ICD-10-CM

## 2021-04-13 DIAGNOSIS — G56.02 CARPAL TUNNEL SYNDROME OF LEFT WRIST: ICD-10-CM

## 2021-04-13 PROCEDURE — 99999 PR PBB SHADOW E&M-EST. PATIENT-LVL III: CPT | Mod: PBBFAC,,, | Performed by: ORTHOPAEDIC SURGERY

## 2021-04-13 PROCEDURE — 99999 PR PBB SHADOW E&M-EST. PATIENT-LVL III: ICD-10-PCS | Mod: PBBFAC,,, | Performed by: ORTHOPAEDIC SURGERY

## 2021-04-13 PROCEDURE — 99213 PR OFFICE/OUTPT VISIT, EST, LEVL III, 20-29 MIN: ICD-10-PCS | Mod: S$PBB,24,, | Performed by: ORTHOPAEDIC SURGERY

## 2021-04-13 PROCEDURE — 99213 OFFICE O/P EST LOW 20 MIN: CPT | Mod: PBBFAC | Performed by: ORTHOPAEDIC SURGERY

## 2021-04-13 PROCEDURE — 99213 OFFICE O/P EST LOW 20 MIN: CPT | Mod: S$PBB,24,, | Performed by: ORTHOPAEDIC SURGERY

## 2021-04-16 ENCOUNTER — OFFICE VISIT (OUTPATIENT)
Dept: ORTHOPEDICS | Facility: CLINIC | Age: 56
End: 2021-04-16
Payer: MEDICAID

## 2021-04-16 ENCOUNTER — HOSPITAL ENCOUNTER (OUTPATIENT)
Dept: RADIOLOGY | Facility: HOSPITAL | Age: 56
Discharge: HOME | End: 2021-04-16
Attending: STUDENT IN AN ORGANIZED HEALTH CARE EDUCATION/TRAINING PROGRAM
Payer: MEDICAID

## 2021-04-16 ENCOUNTER — HOSPITAL ENCOUNTER (OUTPATIENT)
Dept: RADIOLOGY | Facility: CLINIC | Age: 56
Discharge: HOME | End: 2021-04-16
Attending: STUDENT IN AN ORGANIZED HEALTH CARE EDUCATION/TRAINING PROGRAM
Payer: MEDICAID

## 2021-04-16 VITALS — BODY MASS INDEX: 21.86 KG/M2 | WEIGHT: 136 LBS | HEIGHT: 66 IN

## 2021-04-16 DIAGNOSIS — M25.562 LEFT KNEE PAIN, UNSPECIFIED CHRONICITY: Primary | ICD-10-CM

## 2021-04-16 DIAGNOSIS — M17.12 PRIMARY OSTEOARTHRITIS OF LEFT KNEE: ICD-10-CM

## 2021-04-16 DIAGNOSIS — M19.012 GLENOHUMERAL ARTHRITIS, LEFT: Primary | ICD-10-CM

## 2021-04-16 DIAGNOSIS — M25.562 LEFT KNEE PAIN, UNSPECIFIED CHRONICITY: ICD-10-CM

## 2021-04-16 PROCEDURE — 99999 PR PBB SHADOW E&M-EST. PATIENT-LVL III: ICD-10-PCS | Mod: PBBFAC,,, | Performed by: STUDENT IN AN ORGANIZED HEALTH CARE EDUCATION/TRAINING PROGRAM

## 2021-04-16 PROCEDURE — 73564 X-RAY EXAM KNEE 4 OR MORE: CPT | Mod: 26,LT,, | Performed by: RADIOLOGY

## 2021-04-16 PROCEDURE — 73564 X-RAY EXAM KNEE 4 OR MORE: CPT | Mod: TC,LT

## 2021-04-16 PROCEDURE — 73564 XR KNEE ORTHO LEFT WITH FLEXION: ICD-10-PCS | Mod: 26,LT,, | Performed by: RADIOLOGY

## 2021-04-16 PROCEDURE — 99999 PR PBB SHADOW E&M-EST. PATIENT-LVL III: CPT | Mod: PBBFAC,,, | Performed by: STUDENT IN AN ORGANIZED HEALTH CARE EDUCATION/TRAINING PROGRAM

## 2021-04-16 PROCEDURE — 20610 DRAIN/INJ JOINT/BURSA W/O US: CPT | Mod: PBBFAC,LT | Performed by: STUDENT IN AN ORGANIZED HEALTH CARE EDUCATION/TRAINING PROGRAM

## 2021-04-16 PROCEDURE — 73562 XR KNEE ORTHO LEFT WITH FLEXION: ICD-10-PCS | Mod: 26,RT,, | Performed by: RADIOLOGY

## 2021-04-16 PROCEDURE — 99024 PR POST-OP FOLLOW-UP VISIT: ICD-10-PCS | Mod: ,,, | Performed by: STUDENT IN AN ORGANIZED HEALTH CARE EDUCATION/TRAINING PROGRAM

## 2021-04-16 PROCEDURE — 99024 POSTOP FOLLOW-UP VISIT: CPT | Mod: ,,, | Performed by: STUDENT IN AN ORGANIZED HEALTH CARE EDUCATION/TRAINING PROGRAM

## 2021-04-16 PROCEDURE — 20610 LARGE JOINT ASPIRATION/INJECTION: L KNEE: ICD-10-PCS | Mod: S$PBB,79,LT, | Performed by: STUDENT IN AN ORGANIZED HEALTH CARE EDUCATION/TRAINING PROGRAM

## 2021-04-16 PROCEDURE — 99213 OFFICE O/P EST LOW 20 MIN: CPT | Mod: PBBFAC,25 | Performed by: STUDENT IN AN ORGANIZED HEALTH CARE EDUCATION/TRAINING PROGRAM

## 2021-04-16 PROCEDURE — 73562 X-RAY EXAM OF KNEE 3: CPT | Mod: 26,RT,, | Performed by: RADIOLOGY

## 2021-04-16 RX ORDER — TRIAMCINOLONE ACETONIDE 40 MG/ML
80 INJECTION, SUSPENSION INTRA-ARTICULAR; INTRAMUSCULAR
Status: DISCONTINUED | OUTPATIENT
Start: 2021-04-16 | End: 2021-04-16 | Stop reason: HOSPADM

## 2021-04-16 RX ADMIN — TRIAMCINOLONE ACETONIDE 80 MG: 200 INJECTION, SUSPENSION INTRA-ARTICULAR; INTRAMUSCULAR at 11:04

## 2021-06-08 DIAGNOSIS — Z00.00 HEALTH CARE MAINTENANCE: Primary | ICD-10-CM

## 2021-06-30 ENCOUNTER — PATIENT MESSAGE (OUTPATIENT)
Dept: ORTHOPEDICS | Facility: CLINIC | Age: 56
End: 2021-06-30

## 2021-07-01 ENCOUNTER — TELEPHONE (OUTPATIENT)
Dept: ORTHOPEDICS | Facility: CLINIC | Age: 56
End: 2021-07-01

## 2021-07-02 ENCOUNTER — OFFICE VISIT (OUTPATIENT)
Dept: ORTHOPEDICS | Facility: CLINIC | Age: 56
End: 2021-07-02
Payer: MEDICAID

## 2021-07-02 VITALS — WEIGHT: 136 LBS | BODY MASS INDEX: 21.86 KG/M2 | HEIGHT: 66 IN

## 2021-07-02 DIAGNOSIS — M17.12 PRIMARY OSTEOARTHRITIS OF LEFT KNEE: Primary | ICD-10-CM

## 2021-07-02 PROCEDURE — 20610 DRAIN/INJ JOINT/BURSA W/O US: CPT | Mod: PBBFAC,LT | Performed by: STUDENT IN AN ORGANIZED HEALTH CARE EDUCATION/TRAINING PROGRAM

## 2021-07-02 PROCEDURE — 99214 OFFICE O/P EST MOD 30 MIN: CPT | Mod: S$PBB,25,, | Performed by: STUDENT IN AN ORGANIZED HEALTH CARE EDUCATION/TRAINING PROGRAM

## 2021-07-02 PROCEDURE — 99214 PR OFFICE/OUTPT VISIT, EST, LEVL IV, 30-39 MIN: ICD-10-PCS | Mod: S$PBB,25,, | Performed by: STUDENT IN AN ORGANIZED HEALTH CARE EDUCATION/TRAINING PROGRAM

## 2021-07-02 PROCEDURE — 99999 PR PBB SHADOW E&M-EST. PATIENT-LVL III: ICD-10-PCS | Mod: PBBFAC,,, | Performed by: STUDENT IN AN ORGANIZED HEALTH CARE EDUCATION/TRAINING PROGRAM

## 2021-07-02 PROCEDURE — 99213 OFFICE O/P EST LOW 20 MIN: CPT | Mod: PBBFAC,25 | Performed by: STUDENT IN AN ORGANIZED HEALTH CARE EDUCATION/TRAINING PROGRAM

## 2021-07-02 PROCEDURE — 99999 PR PBB SHADOW E&M-EST. PATIENT-LVL III: CPT | Mod: PBBFAC,,, | Performed by: STUDENT IN AN ORGANIZED HEALTH CARE EDUCATION/TRAINING PROGRAM

## 2021-07-02 PROCEDURE — 20610 LARGE JOINT ASPIRATION/INJECTION: L KNEE: ICD-10-PCS | Mod: S$PBB,LT,, | Performed by: STUDENT IN AN ORGANIZED HEALTH CARE EDUCATION/TRAINING PROGRAM

## 2021-07-02 RX ORDER — TIOTROPIUM BROMIDE 18 UG/1
CAPSULE ORAL; RESPIRATORY (INHALATION)
COMMUNITY
Start: 2021-05-28 | End: 2021-12-03

## 2021-07-02 RX ORDER — TRIAMCINOLONE ACETONIDE 40 MG/ML
80 INJECTION, SUSPENSION INTRA-ARTICULAR; INTRAMUSCULAR
Status: DISCONTINUED | OUTPATIENT
Start: 2021-07-02 | End: 2021-07-02 | Stop reason: HOSPADM

## 2021-07-02 RX ADMIN — TRIAMCINOLONE ACETONIDE 80 MG: 200 INJECTION, SUSPENSION INTRA-ARTICULAR; INTRAMUSCULAR at 09:07

## 2021-07-14 ENCOUNTER — PATIENT MESSAGE (OUTPATIENT)
Dept: ORTHOPEDICS | Facility: CLINIC | Age: 56
End: 2021-07-14

## 2021-07-27 RX ORDER — OXYCODONE HYDROCHLORIDE 5 MG/1
5 TABLET ORAL EVERY 6 HOURS PRN
Qty: 42 TABLET | Refills: 0 | OUTPATIENT
Start: 2021-07-27

## 2021-10-14 ENCOUNTER — OFFICE VISIT (OUTPATIENT)
Dept: PODIATRY | Facility: CLINIC | Age: 56
End: 2021-10-14
Payer: MEDICAID

## 2021-10-14 VITALS
HEIGHT: 66 IN | HEART RATE: 79 BPM | DIASTOLIC BLOOD PRESSURE: 77 MMHG | SYSTOLIC BLOOD PRESSURE: 123 MMHG | BODY MASS INDEX: 21.86 KG/M2 | WEIGHT: 136 LBS

## 2021-10-14 DIAGNOSIS — M21.371 ACQUIRED RIGHT FOOT DROP: ICD-10-CM

## 2021-10-14 DIAGNOSIS — G89.29 CHRONIC RADICULAR PAIN OF LOWER EXTREMITY: Primary | ICD-10-CM

## 2021-10-14 DIAGNOSIS — L84 CORN OR CALLUS: ICD-10-CM

## 2021-10-14 DIAGNOSIS — M21.6X9 CAVUS FOOT, ACQUIRED: ICD-10-CM

## 2021-10-14 DIAGNOSIS — M54.10 CHRONIC RADICULAR PAIN OF LOWER EXTREMITY: Primary | ICD-10-CM

## 2021-10-14 PROCEDURE — 99999 PR PBB SHADOW E&M-EST. PATIENT-LVL III: ICD-10-PCS | Mod: PBBFAC,,, | Performed by: PODIATRIST

## 2021-10-14 PROCEDURE — 99213 OFFICE O/P EST LOW 20 MIN: CPT | Mod: PBBFAC | Performed by: PODIATRIST

## 2021-10-14 PROCEDURE — 99999 PR PBB SHADOW E&M-EST. PATIENT-LVL III: CPT | Mod: PBBFAC,,, | Performed by: PODIATRIST

## 2021-10-14 PROCEDURE — 99214 OFFICE O/P EST MOD 30 MIN: CPT | Mod: S$PBB,,, | Performed by: PODIATRIST

## 2021-10-14 PROCEDURE — 99214 PR OFFICE/OUTPT VISIT, EST, LEVL IV, 30-39 MIN: ICD-10-PCS | Mod: S$PBB,,, | Performed by: PODIATRIST

## 2021-10-18 ENCOUNTER — TELEPHONE (OUTPATIENT)
Dept: PULMONOLOGY | Facility: CLINIC | Age: 56
End: 2021-10-18

## 2021-10-18 DIAGNOSIS — R05.9 COUGH: Primary | ICD-10-CM

## 2021-10-19 ENCOUNTER — HOSPITAL ENCOUNTER (OUTPATIENT)
Dept: RADIOLOGY | Facility: HOSPITAL | Age: 56
Discharge: HOME | End: 2021-10-19
Attending: INTERNAL MEDICINE
Payer: MEDICAID

## 2021-10-19 ENCOUNTER — OFFICE VISIT (OUTPATIENT)
Dept: PULMONOLOGY | Facility: CLINIC | Age: 56
End: 2021-10-19
Payer: MEDICAID

## 2021-10-19 ENCOUNTER — HOSPITAL ENCOUNTER (EMERGENCY)
Facility: HOSPITAL | Age: 56
Discharge: HOME | End: 2021-10-19
Attending: EMERGENCY MEDICINE
Payer: MEDICAID

## 2021-10-19 VITALS
DIASTOLIC BLOOD PRESSURE: 65 MMHG | WEIGHT: 127.63 LBS | HEART RATE: 75 BPM | HEIGHT: 66 IN | TEMPERATURE: 98 F | RESPIRATION RATE: 18 BRPM | OXYGEN SATURATION: 100 % | BODY MASS INDEX: 20.51 KG/M2 | SYSTOLIC BLOOD PRESSURE: 107 MMHG

## 2021-10-19 VITALS
SYSTOLIC BLOOD PRESSURE: 120 MMHG | HEART RATE: 78 BPM | HEIGHT: 66 IN | WEIGHT: 125.69 LBS | OXYGEN SATURATION: 97 % | RESPIRATION RATE: 16 BRPM | DIASTOLIC BLOOD PRESSURE: 70 MMHG | BODY MASS INDEX: 20.2 KG/M2

## 2021-10-19 DIAGNOSIS — Z72.0 TOBACCO ABUSE: ICD-10-CM

## 2021-10-19 DIAGNOSIS — R91.8 PULMONARY NODULES/LESIONS, MULTIPLE: ICD-10-CM

## 2021-10-19 DIAGNOSIS — M54.50 ACUTE RIGHT-SIDED LOW BACK PAIN WITHOUT SCIATICA: ICD-10-CM

## 2021-10-19 DIAGNOSIS — R05.9 COUGH: ICD-10-CM

## 2021-10-19 DIAGNOSIS — J44.9 COPD SUGGESTED BY INITIAL EVALUATION: Primary | ICD-10-CM

## 2021-10-19 DIAGNOSIS — R91.1 PULMONARY NODULE: ICD-10-CM

## 2021-10-19 DIAGNOSIS — M62.830 BACK SPASM: Primary | ICD-10-CM

## 2021-10-19 DIAGNOSIS — R10.9 ACUTE FLANK PAIN: ICD-10-CM

## 2021-10-19 PROBLEM — M79.7 FIBROMYOSITIS: Status: ACTIVE | Noted: 2021-10-19

## 2021-10-19 LAB
BILIRUB UR QL STRIP: NEGATIVE
CLARITY UR: CLEAR
COLOR UR: YELLOW
GLUCOSE UR QL STRIP: NEGATIVE
HGB UR QL STRIP: NEGATIVE
KETONES UR QL STRIP: NEGATIVE
LEUKOCYTE ESTERASE UR QL STRIP: NEGATIVE
NITRITE UR QL STRIP: NEGATIVE
PH UR STRIP: 7 [PH] (ref 5–8)
PROT UR QL STRIP: NEGATIVE
SP GR UR STRIP: 1.02 (ref 1–1.03)
URN SPEC COLLECT METH UR: NORMAL
UROBILINOGEN UR STRIP-ACNC: NEGATIVE EU/DL

## 2021-10-19 PROCEDURE — 81003 URINALYSIS AUTO W/O SCOPE: CPT | Performed by: NURSE PRACTITIONER

## 2021-10-19 PROCEDURE — 99999 PR PBB SHADOW E&M-EST. PATIENT-LVL V: CPT | Mod: PBBFAC,,, | Performed by: INTERNAL MEDICINE

## 2021-10-19 PROCEDURE — 99284 EMERGENCY DEPT VISIT MOD MDM: CPT | Mod: 25,27

## 2021-10-19 PROCEDURE — 99205 OFFICE O/P NEW HI 60 MIN: CPT | Mod: 25,S$PBB,, | Performed by: INTERNAL MEDICINE

## 2021-10-19 PROCEDURE — 71046 X-RAY EXAM CHEST 2 VIEWS: CPT | Mod: 26,,, | Performed by: RADIOLOGY

## 2021-10-19 PROCEDURE — 99215 OFFICE O/P EST HI 40 MIN: CPT | Mod: PBBFAC,25 | Performed by: INTERNAL MEDICINE

## 2021-10-19 PROCEDURE — 71046 XR CHEST PA AND LATERAL: ICD-10-PCS | Mod: 26,,, | Performed by: RADIOLOGY

## 2021-10-19 PROCEDURE — 90471 IMMUNIZATION ADMIN: CPT | Mod: PBBFAC

## 2021-10-19 PROCEDURE — 99205 PR OFFICE/OUTPT VISIT, NEW, LEVL V, 60-74 MIN: ICD-10-PCS | Mod: 25,S$PBB,, | Performed by: INTERNAL MEDICINE

## 2021-10-19 PROCEDURE — 71046 X-RAY EXAM CHEST 2 VIEWS: CPT | Mod: TC

## 2021-10-19 PROCEDURE — 99999 PR PBB SHADOW E&M-EST. PATIENT-LVL V: ICD-10-PCS | Mod: PBBFAC,,, | Performed by: INTERNAL MEDICINE

## 2021-10-19 RX ORDER — METHOCARBAMOL 500 MG/1
1000 TABLET, FILM COATED ORAL 3 TIMES DAILY PRN
Qty: 20 TABLET | Refills: 0 | Status: SHIPPED | OUTPATIENT
Start: 2021-10-19 | End: 2022-12-05 | Stop reason: SDUPTHER

## 2021-12-03 ENCOUNTER — CLINICAL SUPPORT (OUTPATIENT)
Dept: PULMONOLOGY | Facility: CLINIC | Age: 56
End: 2021-12-03
Payer: MEDICAID

## 2021-12-03 ENCOUNTER — OFFICE VISIT (OUTPATIENT)
Dept: PULMONOLOGY | Facility: CLINIC | Age: 56
End: 2021-12-03
Payer: MEDICAID

## 2021-12-03 VITALS
DIASTOLIC BLOOD PRESSURE: 74 MMHG | SYSTOLIC BLOOD PRESSURE: 135 MMHG | BODY MASS INDEX: 21.01 KG/M2 | WEIGHT: 126.13 LBS | HEART RATE: 87 BPM | OXYGEN SATURATION: 98 % | HEIGHT: 65 IN | RESPIRATION RATE: 16 BRPM

## 2021-12-03 VITALS — HEIGHT: 65 IN | WEIGHT: 126 LBS | BODY MASS INDEX: 20.99 KG/M2

## 2021-12-03 DIAGNOSIS — J44.89 COPD WITH ASTHMA: Primary | ICD-10-CM

## 2021-12-03 DIAGNOSIS — J44.9 COPD SUGGESTED BY INITIAL EVALUATION: ICD-10-CM

## 2021-12-03 DIAGNOSIS — R91.8 PULMONARY NODULES/LESIONS, MULTIPLE: ICD-10-CM

## 2021-12-03 DIAGNOSIS — Z72.0 TOBACCO ABUSE: ICD-10-CM

## 2021-12-03 LAB
ALLENS TEST: ABNORMAL
BRPFT: ABNORMAL
DELSYS: ABNORMAL
DLCO ADJ PRE: 14.91 ML/(MIN*MMHG) (ref 18.19–29.66)
DLCO SINGLE BREATH LLN: 18.19
DLCO SINGLE BREATH PRE REF: 62.3 %
DLCO SINGLE BREATH REF: 23.93
DLCOC SBVA LLN: 3.24
DLCOC SBVA PRE REF: 78 %
DLCOC SBVA REF: 4.69
DLCOC SINGLE BREATH LLN: 18.19
DLCOC SINGLE BREATH PRE REF: 62.3 %
DLCOC SINGLE BREATH REF: 23.93
DLCOVA LLN: 3.24
DLCOVA PRE REF: 78 %
DLCOVA PRE: 3.66 ML/(MIN*MMHG*L) (ref 3.24–6.14)
DLCOVA REF: 4.69
DLVAADJ PRE: 3.66 ML/(MIN*MMHG*L) (ref 3.24–6.14)
ERV LLN: -16449.13
ERV PRE REF: 109.3 %
ERV REF: 0.87
FEF 25 75 LLN: 1.32
FEF 25 75 PRE REF: 85.9 %
FEF 25 75 REF: 2.48
FEV1 FVC LLN: 68
FEV1 FVC PRE REF: 95 %
FEV1 FVC REF: 80
FEV1 LLN: 2.05
FEV1 PRE REF: 89.4 %
FEV1 REF: 2.68
FIO2: 21
FRCPLETH LLN: 1.93
FRCPLETH PREREF: 93.3 %
FRCPLETH REF: 2.75
FVC LLN: 2.6
FVC PRE REF: 93.5 %
FVC REF: 3.39
HCO3 UR-SCNC: 24.4 MMOL/L (ref 24–28)
IVC PRE: 2.99 L (ref 2.6–4.18)
IVC SINGLE BREATH LLN: 2.6
IVC SINGLE BREATH PRE REF: 88.1 %
IVC SINGLE BREATH REF: 3.39
MODE: ABNORMAL
MVV LLN: 86
MVV PRE REF: 67.5 %
MVV REF: 101
PCO2 BLDA: 33.8 MMHG (ref 35–45)
PEF LLN: 4.88
PEF PRE REF: 55 %
PEF REF: 6.64
PH SMN: 7.47 [PH] (ref 7.35–7.45)
PO2 BLDA: 111 MMHG (ref 80–100)
POC BE: 1 MMOL/L
POC SATURATED O2: 99 % (ref 95–100)
PRE DLCO: 14.91 ML/(MIN*MMHG) (ref 18.19–29.66)
PRE ERV: 0.95 L (ref -16449.13–16450.87)
PRE FEF 25 75: 2.13 L/S (ref 1.32–3.65)
PRE FET 100: 7.09 SEC
PRE FEV1 FVC: 75.61 % (ref 68–91.25)
PRE FEV1: 2.4 L (ref 2.05–3.31)
PRE FRC PL: 2.57 L
PRE FVC: 3.17 L (ref 2.6–4.18)
PRE MVV: 68 L/MIN (ref 85.59–115.81)
PRE PEF: 3.65 L/S (ref 4.88–8.41)
PRE RV: 1.51 L (ref 1.31–2.46)
PRE TLC: 4.78 L (ref 4.11–6.09)
RAW LLN: 3.06
RAW PRE REF: 112.2 %
RAW PRE: 3.43 CMH2O*S/L (ref 3.06–3.06)
RAW REF: 3.06
RV LLN: 1.31
RV PRE REF: 80.4 %
RV REF: 1.88
RVTLC LLN: 28
RVTLC PRE REF: 83.4 %
RVTLC PRE: 31.69 % (ref 28.41–47.59)
RVTLC REF: 38
SAMPLE: ABNORMAL
SITE: ABNORMAL
TLC LLN: 4.11
TLC PRE REF: 93.7 %
TLC REF: 5.1
VA PRE: 4.08 L (ref 4.95–4.95)
VA SINGLE BREATH LLN: 4.95
VA SINGLE BREATH PRE REF: 82.3 %
VA SINGLE BREATH REF: 4.95
VC LLN: 2.6
VC PRE REF: 96.3 %
VC PRE: 3.26 L (ref 2.6–4.18)
VC REF: 3.39
VTGRAWPRE: 3.09 L

## 2021-12-03 PROCEDURE — 94729 DIFFUSING CAPACITY: CPT | Mod: 26,S$PBB,, | Performed by: INTERNAL MEDICINE

## 2021-12-03 PROCEDURE — 94729 DIFFUSING CAPACITY: CPT | Mod: PBBFAC

## 2021-12-03 PROCEDURE — 99999 PR PBB SHADOW E&M-EST. PATIENT-LVL V: CPT | Mod: PBBFAC,,, | Performed by: INTERNAL MEDICINE

## 2021-12-03 PROCEDURE — 94729 PR C02/MEMBANE DIFFUSE CAPACITY: ICD-10-PCS | Mod: 26,S$PBB,, | Performed by: INTERNAL MEDICINE

## 2021-12-03 PROCEDURE — 36600 WITHDRAWAL OF ARTERIAL BLOOD: CPT | Mod: PBBFAC

## 2021-12-03 PROCEDURE — 99999 PR PBB SHADOW E&M-EST. PATIENT-LVL V: ICD-10-PCS | Mod: PBBFAC,,, | Performed by: INTERNAL MEDICINE

## 2021-12-03 PROCEDURE — 99999 PR PBB SHADOW E&M-EST. PATIENT-LVL I: ICD-10-PCS | Mod: PBBFAC,,,

## 2021-12-03 PROCEDURE — 94010 BREATHING CAPACITY TEST: CPT | Mod: PBBFAC

## 2021-12-03 PROCEDURE — 99215 OFFICE O/P EST HI 40 MIN: CPT | Mod: PBBFAC,27 | Performed by: INTERNAL MEDICINE

## 2021-12-03 PROCEDURE — 94618 PULMONARY STRESS TESTING: CPT | Mod: 26,S$PBB,, | Performed by: INTERNAL MEDICINE

## 2021-12-03 PROCEDURE — 99211 OFF/OP EST MAY X REQ PHY/QHP: CPT | Mod: PBBFAC,27

## 2021-12-03 PROCEDURE — 82803 BLOOD GASES ANY COMBINATION: CPT | Mod: PBBFAC

## 2021-12-03 PROCEDURE — 99214 PR OFFICE/OUTPT VISIT, EST, LEVL IV, 30-39 MIN: ICD-10-PCS | Mod: 25,S$PBB,, | Performed by: INTERNAL MEDICINE

## 2021-12-03 PROCEDURE — 94726 PULM FUNCT TST PLETHYSMOGRAP: ICD-10-PCS | Mod: 26,S$PBB,, | Performed by: INTERNAL MEDICINE

## 2021-12-03 PROCEDURE — 99214 OFFICE O/P EST MOD 30 MIN: CPT | Mod: 25,S$PBB,, | Performed by: INTERNAL MEDICINE

## 2021-12-03 PROCEDURE — 94726 PLETHYSMOGRAPHY LUNG VOLUMES: CPT | Mod: PBBFAC

## 2021-12-03 PROCEDURE — 94618 PULMONARY STRESS TESTING: CPT | Mod: PBBFAC

## 2021-12-03 PROCEDURE — 94010 BREATHING CAPACITY TEST: ICD-10-PCS | Mod: 26,59,S$PBB, | Performed by: INTERNAL MEDICINE

## 2021-12-03 PROCEDURE — 36600 PR WITHDRAWAL OF ARTERIAL BLOOD: ICD-10-PCS | Mod: 59,S$PBB,, | Performed by: INTERNAL MEDICINE

## 2021-12-03 PROCEDURE — 99211 OFF/OP EST MAY X REQ PHY/QHP: CPT | Mod: PBBFAC

## 2021-12-03 PROCEDURE — 94726 PLETHYSMOGRAPHY LUNG VOLUMES: CPT | Mod: 26,S$PBB,, | Performed by: INTERNAL MEDICINE

## 2021-12-03 PROCEDURE — 99999 PR PBB SHADOW E&M-EST. PATIENT-LVL I: CPT | Mod: PBBFAC,,,

## 2021-12-03 PROCEDURE — 94618 PULMONARY STRESS TESTING: ICD-10-PCS | Mod: 26,S$PBB,, | Performed by: INTERNAL MEDICINE

## 2021-12-03 PROCEDURE — 94010 BREATHING CAPACITY TEST: CPT | Mod: 26,59,S$PBB, | Performed by: INTERNAL MEDICINE

## 2021-12-03 PROCEDURE — 36600 WITHDRAWAL OF ARTERIAL BLOOD: CPT | Mod: 59,S$PBB,, | Performed by: INTERNAL MEDICINE

## 2021-12-03 RX ORDER — METHACHOLINE CHLORIDE 0 MG/3 ML
3 VIAL, NEBULIZER (ML) INHALATION ONCE
Status: ACTIVE | OUTPATIENT
Start: 2021-12-03

## 2021-12-03 RX ORDER — METHACHOLINE CHLORIDE 48 MG/3 ML
3 VIAL, NEBULIZER (ML) INHALATION ONCE
Status: ACTIVE | OUTPATIENT
Start: 2021-12-03

## 2021-12-03 RX ORDER — METHACHOLINE CHLORIDE 0.75/3ML
3 VIAL, NEBULIZER (ML) INHALATION ONCE
Status: ACTIVE | OUTPATIENT
Start: 2021-12-03

## 2021-12-03 RX ORDER — METHACHOLINE CHLORIDE 0.1875/3ML
3 VIAL, NEBULIZER (ML) INHALATION ONCE
Status: ACTIVE | OUTPATIENT
Start: 2021-12-03

## 2021-12-03 RX ORDER — METHACHOLINE CHLORIDE 3 MG/3 ML
3 VIAL, NEBULIZER (ML) INHALATION ONCE
Status: ACTIVE | OUTPATIENT
Start: 2021-12-03

## 2021-12-03 RX ORDER — ALBUTEROL SULFATE 90 UG/1
1 AEROSOL, METERED RESPIRATORY (INHALATION) EVERY 4 HOURS PRN
Qty: 18 G | Refills: 2 | Status: SHIPPED | OUTPATIENT
Start: 2021-12-03 | End: 2022-03-16

## 2021-12-03 RX ORDER — METHACHOLINE CHLORIDE 12 MG/3 ML
3 VIAL, NEBULIZER (ML) INHALATION ONCE
Status: ACTIVE | OUTPATIENT
Start: 2021-12-03

## 2021-12-17 ENCOUNTER — NURSE TRIAGE (OUTPATIENT)
Dept: ADMINISTRATIVE | Facility: CLINIC | Age: 56
End: 2021-12-17
Payer: MEDICAID

## 2022-03-02 ENCOUNTER — PATIENT MESSAGE (OUTPATIENT)
Dept: SLEEP MEDICINE | Facility: CLINIC | Age: 57
End: 2022-03-02
Payer: MEDICAID

## 2022-03-20 ENCOUNTER — TELEPHONE (OUTPATIENT)
Dept: PULMONOLOGY | Facility: HOSPITAL | Age: 57
End: 2022-03-20
Payer: MEDICAID

## 2022-03-20 NOTE — TELEPHONE ENCOUNTER
----- Message from Dona Cardona MA sent at 3/17/2022  4:05 PM CDT -----  Contact: [phone]  Spoke with pt. Pt has some questions regarding hospital visit she had recently. I offered her appt for tomorrow, but she has to work at that time. Pt would like for you to please give her a call at your convenience.   ----- Message -----  From: Sloane Young  Sent: 3/17/2022   2:33 PM CDT  To: Letty Cardona Staff    Patient called, would like to get a courtesy call from Dr Saenz about a visit that she had at the emergency 03/15/22  at the [place] emergency room. Please call and advise. Thank you

## 2022-04-14 ENCOUNTER — OFFICE VISIT (OUTPATIENT)
Dept: SLEEP MEDICINE | Facility: CLINIC | Age: 57
End: 2022-04-14
Payer: MEDICAID

## 2022-04-14 ENCOUNTER — TELEPHONE (OUTPATIENT)
Dept: SPORTS MEDICINE | Facility: CLINIC | Age: 57
End: 2022-04-14
Payer: MEDICAID

## 2022-04-14 ENCOUNTER — HOSPITAL ENCOUNTER (OUTPATIENT)
Dept: RADIOLOGY | Facility: HOSPITAL | Age: 57
Discharge: HOME | End: 2022-04-14
Attending: INTERNAL MEDICINE
Payer: MEDICAID

## 2022-04-14 VITALS
SYSTOLIC BLOOD PRESSURE: 132 MMHG | BODY MASS INDEX: 21.01 KG/M2 | HEART RATE: 86 BPM | DIASTOLIC BLOOD PRESSURE: 80 MMHG | HEIGHT: 65 IN | RESPIRATION RATE: 16 BRPM | OXYGEN SATURATION: 98 % | WEIGHT: 126.13 LBS

## 2022-04-14 DIAGNOSIS — R91.8 PULMONARY NODULES/LESIONS, MULTIPLE: Primary | ICD-10-CM

## 2022-04-14 DIAGNOSIS — J44.89 COPD WITH ASTHMA: ICD-10-CM

## 2022-04-14 DIAGNOSIS — Z72.0 TOBACCO ABUSE: ICD-10-CM

## 2022-04-14 DIAGNOSIS — R91.8 PULMONARY NODULES/LESIONS, MULTIPLE: ICD-10-CM

## 2022-04-14 PROBLEM — M17.12 PRIMARY OSTEOARTHRITIS OF LEFT KNEE: Status: ACTIVE | Noted: 2017-01-04

## 2022-04-14 PROBLEM — M19.90 OSTEOARTHRITIS: Status: ACTIVE | Noted: 2021-05-28

## 2022-04-14 PROCEDURE — 1159F MED LIST DOCD IN RCRD: CPT | Mod: CPTII,,, | Performed by: INTERNAL MEDICINE

## 2022-04-14 PROCEDURE — 3079F PR MOST RECENT DIASTOLIC BLOOD PRESSURE 80-89 MM HG: ICD-10-PCS | Mod: CPTII,,, | Performed by: INTERNAL MEDICINE

## 2022-04-14 PROCEDURE — 71250 CT THORAX DX C-: CPT | Mod: 26,,, | Performed by: RADIOLOGY

## 2022-04-14 PROCEDURE — 3008F PR BODY MASS INDEX (BMI) DOCUMENTED: ICD-10-PCS | Mod: CPTII,,, | Performed by: INTERNAL MEDICINE

## 2022-04-14 PROCEDURE — 71250 CT CHEST WITHOUT CONTRAST: ICD-10-PCS | Mod: 26,,, | Performed by: RADIOLOGY

## 2022-04-14 PROCEDURE — 99999 PR PBB SHADOW E&M-EST. PATIENT-LVL V: ICD-10-PCS | Mod: PBBFAC,,, | Performed by: INTERNAL MEDICINE

## 2022-04-14 PROCEDURE — 3008F BODY MASS INDEX DOCD: CPT | Mod: CPTII,,, | Performed by: INTERNAL MEDICINE

## 2022-04-14 PROCEDURE — 1159F PR MEDICATION LIST DOCUMENTED IN MEDICAL RECORD: ICD-10-PCS | Mod: CPTII,,, | Performed by: INTERNAL MEDICINE

## 2022-04-14 PROCEDURE — 1160F PR REVIEW ALL MEDS BY PRESCRIBER/CLIN PHARMACIST DOCUMENTED: ICD-10-PCS | Mod: CPTII,,, | Performed by: INTERNAL MEDICINE

## 2022-04-14 PROCEDURE — 99214 PR OFFICE/OUTPT VISIT, EST, LEVL IV, 30-39 MIN: ICD-10-PCS | Mod: S$PBB,,, | Performed by: INTERNAL MEDICINE

## 2022-04-14 PROCEDURE — 99214 OFFICE O/P EST MOD 30 MIN: CPT | Mod: S$PBB,,, | Performed by: INTERNAL MEDICINE

## 2022-04-14 PROCEDURE — 71250 CT THORAX DX C-: CPT | Mod: TC

## 2022-04-14 PROCEDURE — 99215 OFFICE O/P EST HI 40 MIN: CPT | Mod: PBBFAC,25 | Performed by: INTERNAL MEDICINE

## 2022-04-14 PROCEDURE — 3075F PR MOST RECENT SYSTOLIC BLOOD PRESS GE 130-139MM HG: ICD-10-PCS | Mod: CPTII,,, | Performed by: INTERNAL MEDICINE

## 2022-04-14 PROCEDURE — 3075F SYST BP GE 130 - 139MM HG: CPT | Mod: CPTII,,, | Performed by: INTERNAL MEDICINE

## 2022-04-14 PROCEDURE — 99999 PR PBB SHADOW E&M-EST. PATIENT-LVL V: CPT | Mod: PBBFAC,,, | Performed by: INTERNAL MEDICINE

## 2022-04-14 PROCEDURE — 1160F RVW MEDS BY RX/DR IN RCRD: CPT | Mod: CPTII,,, | Performed by: INTERNAL MEDICINE

## 2022-04-14 PROCEDURE — 3079F DIAST BP 80-89 MM HG: CPT | Mod: CPTII,,, | Performed by: INTERNAL MEDICINE

## 2022-04-14 RX ORDER — TIOTROPIUM BROMIDE 18 UG/1
CAPSULE ORAL; RESPIRATORY (INHALATION)
COMMUNITY
Start: 2022-02-24

## 2022-04-14 RX ORDER — FAMOTIDINE 40 MG/1
40 TABLET, FILM COATED ORAL 2 TIMES DAILY
COMMUNITY
Start: 2022-03-16

## 2022-04-14 NOTE — TELEPHONE ENCOUNTER
----- Message from Esthela Huddleston sent at 4/14/2022  4:01 PM CDT -----  Contact: [phone]  Patient is calling regarding right shoulder , states she had a fall on right shoulder and its getting worse. Please call back at [phone] . Thanks/dj       Tumor Debulked?: curette

## 2022-04-14 NOTE — PROGRESS NOTES
Pulmonary Outpatient   Visit     Subjective:       Patient ID: Radha Ramachandran is a 56 y.o. female.    Chief Complaint: COPD and Asthma    The following portions of the patient's history were reviewed and updated as appropriate:   She  has a past medical history of COPD with asthma, Fibromyalgia, H/O psoriatic arthritis, migraines, Post-traumatic osteoarthritis of left shoulder (12/15/2020), and Rheumatoid arthritis.  She does not have any pertinent problems on file.  She  has a past surgical history that includes Eyelid laceration repair (Left); Hysterectomy; Ankle Fusion (Right); Appendectomy; Cholecystectomy; Open reduction and internal fixation (ORIF) of injury of hand (right); ORIF tibia & fibula fractures (Right); and Arthroplasty of shoulder (Left, 1/25/2021).  Her family history is not on file.  She  reports that she has been smoking cigarettes. She has been smoking about 0.50 packs per day. She has never used smokeless tobacco. She reports previous alcohol use. She reports that she does not use drugs.  She has a current medication list which includes the following prescription(s): albuterol, albuterol, cefdinir, famotidine, fluticasone propionate, ibuprofen, levocetirizine, lidocaine-prilocaine, meloxicam, methocarbamol, nitrofurantoin (macrocrystal-monohydrate), oxycodone, spiriva with handihaler, tramadol, tramadol, and aspirin, and the following Facility-Administered Medications: lactated ringers, methacholine chloride, methacholine chloride, methacholine chloride, methacholine chloride, methacholine chloride, and methacholine chloride.  Current Outpatient Medications on File Prior to Visit   Medication Sig Dispense Refill    albuterol (PROVENTIL/VENTOLIN HFA) 90 mcg/actuation inhaler INHALE 1 PUFF EVERY 4 HOURS AS NEEDED 18 g 2    ALBUTEROL INHL Inhale into the lungs.      cefdinir (OMNICEF) 300 MG capsule Take 300 mg by mouth once daily.      famotidine  (PEPCID) 40 MG tablet Take 40 mg by mouth 2 (two) times daily.      fluticasone propionate (FLONASE) 50 mcg/actuation nasal spray 1 spray by Each Nostril route once daily.      ibuprofen (ADVIL,MOTRIN) 800 MG tablet Take 1 tablet (800 mg total) by mouth 3 (three) times daily. 30 tablet 1    levocetirizine (XYZAL) 5 MG tablet Take 5 mg by mouth every evening.      lidocaine-prilocaine (EMLA) cream       meloxicam (MOBIC) 7.5 MG tablet TAKE 1 TABLET BY MOUTH EVERY DAY 30 tablet 2    methocarbamoL (ROBAXIN) 500 MG Tab Take 2 tablets (1,000 mg total) by mouth 3 (three) times daily as needed (muscle spasms or pain). 20 tablet 0    nitrofurantoin, macrocrystal-monohydrate, (MACROBID) 100 MG capsule Take 100 mg by mouth 2 (two) times daily.      oxyCODONE (ROXICODONE) 5 MG immediate release tablet Take 1 tablet (5 mg total) by mouth every 6 (six) hours as needed for Pain (post-op pain). 42 tablet 0    SPIRIVA WITH HANDIHALER 18 mcg inhalation capsule SMARTSI Puff(s) Via Inhaler Daily      traMADoL (ULTRAM) 50 mg tablet Take 1 tablet (50 mg total) by mouth every 6 (six) hours as needed for Pain. 30 tablet 0    traMADoL (ULTRAM) 50 mg tablet Take 1 tablet (50 mg total) by mouth every 6 (six) hours as needed for Pain. 30 tablet 0    aspirin (ECOTRIN) 81 MG EC tablet Take 1 tablet (81 mg total) by mouth 2 (two) times a day. for 14 days 28 tablet 0     Current Facility-Administered Medications on File Prior to Visit   Medication Dose Route Frequency Provider Last Rate Last Admin    lactated ringers infusion   Intravenous Continuous Caprice Yin MD   New Bag at 21 1402    methacholine chloride 0 mg/3 mL (0 mg/mL) nebulizer solution - white vial 3 mL  3 mL Nebulization Once Brendan Saenz MD        methacholine chloride 0.1875 mg/3 mL (0.0625 mg/mL) nebulizer solution - red vial 0.1875 mg  3 mL Nebulization Once Brendan Saenz MD        methacholine chloride 0.75 mg/3 mL (0.25 mg/mL)  nebulizer solution - orange vial 0.75 mg  3 mL Nebulization Once Brendan Saenz MD        methacholine chloride 12 mg/3 mL (4 mg/mL) nebulizer solution - green vial 12 mg  3 mL Nebulization Once Brendan Saenz MD        methacholine chloride 3 mg/3 mL (1 mg/mL) nebulizer solution - yellow vial 3 mg  3 mL Nebulization Once Brendan Saenz MD        methacholine chloride 48 mg/3 mL (16 mg/mL) nebulizer solution - blue vial 48 mg  3 mL Nebulization Once Brendan Saenz MD         She is allergic to latex, natural rubber..     Immunization History   Administered Date(s) Administered    COVID-19, MRNA, LN-S, PF (Pfizer) (Purple Cap) 04/01/2021, 04/23/2021    Influenza - Quadrivalent 08/27/2015    Influenza - Quadrivalent - MDCK 10/19/2021    Influenza - Quadrivalent - PF *Preferred* (6 months and older) 09/21/2016, 02/13/2019    Tdap 04/28/2020    Zoster 03/21/2016, 03/25/2016      Tobacco Use: High Risk    Smoking Tobacco Use: Current Every Day Smoker    Smokeless Tobacco Use: Never Used       COPD Questionnaire  How often do you cough?: Almost never  How often do you have phlegm (mucus) in your chest?: Never  How often does your chest feel tight?: Some of the time  When you walk up a hill or one flight of stairs, how often are you breathless?: All of the time  How often are you limited doing any activities at home?: Never  How often are you confident leaving the house despite your lung condition?: All of the time  How often do you sleep soundly?: Never  How often do you have energy?: Almost never  Total score: 18         Radha Ramachandran is 56 y.o.  Comes to establish care  Seen Dr Ani grey  COPD on SPIRIVA and TEOFILO  Current smoker  Never had PFT  Will schedule  Hx of shoulder replacement  At workup nodule noted  Chest CT 12/2020 reveiwed  Had repeat CT 04/20021: stable nodules  High risk  No weight loss or hemoptysis  She is continuing to smoke less than a pack of cigarettes per day.  "  She is not vaping.   There is no cough or chest pain. There is no hemoptysis. There is no fever, chills, or sweats. There is 1 pillow orthopnea but no PND or platypnea. She does find it hard to breathe with increased humidity and heat.  Previous medical history significant for rheumatoid arthritis, chronic pain, appendectomy, foot surgery, hysterectomy and left shoulder replacement as well as multiple pulmonary nodules.  Social history-patient started smoking at the age of 16 up to 1/2 pack/day has been smoking half a pack to a pack for the last 5 years.   She previously worked as a ".   She had smoked marijuana and used prescription drugs in the past and is also vaped in the past.  Family history was reviewed and is unchanged.  Medications-albuterol MDI inhaler as needed, Flonase, Xyzal and Mobic.  Review of systems otherwise negative except for the above.   .   Records care everywhere reviewed  Images of outside films not available except reports  Dr Ani Yap  notes reviewed      12/03/2021:  Here to review results  PFT dont substantiate COPD  FEV1 2.40L( 89.4%), FEV1/FVC 76, TLC 4.78L( 93.7%)  Has nodule needs f/u in April 2022  ABG reveiwed  NO COPD  Did not get rescue inhaler  DC Spiriva  Check IGE and FeNO    04/14/2022  Has cut down on smoking 1/2 PPD  recently in ER chest pain  Told EKG was abnormal  ACT score 15, COPD score 18  Last PFT: NO OBSTRUCTION  May have VCD              Review of Systems   Respiratory: Negative for cough, chest tightness, shortness of breath, dyspnea on extertion and use of rescue inhaler.    All other systems reviewed and are negative.      Outpatient Encounter Medications as of 4/14/2022   Medication Sig Dispense Refill    albuterol (PROVENTIL/VENTOLIN HFA) 90 mcg/actuation inhaler INHALE 1 PUFF EVERY 4 HOURS AS NEEDED 18 g 2    ALBUTEROL INHL Inhale into the lungs.      cefdinir (OMNICEF) 300 MG capsule Take 300 mg by mouth once daily.      famotidine (PEPCID) " 40 MG tablet Take 40 mg by mouth 2 (two) times daily.      fluticasone propionate (FLONASE) 50 mcg/actuation nasal spray 1 spray by Each Nostril route once daily.      ibuprofen (ADVIL,MOTRIN) 800 MG tablet Take 1 tablet (800 mg total) by mouth 3 (three) times daily. 30 tablet 1    levocetirizine (XYZAL) 5 MG tablet Take 5 mg by mouth every evening.      lidocaine-prilocaine (EMLA) cream       meloxicam (MOBIC) 7.5 MG tablet TAKE 1 TABLET BY MOUTH EVERY DAY 30 tablet 2    methocarbamoL (ROBAXIN) 500 MG Tab Take 2 tablets (1,000 mg total) by mouth 3 (three) times daily as needed (muscle spasms or pain). 20 tablet 0    nitrofurantoin, macrocrystal-monohydrate, (MACROBID) 100 MG capsule Take 100 mg by mouth 2 (two) times daily.      oxyCODONE (ROXICODONE) 5 MG immediate release tablet Take 1 tablet (5 mg total) by mouth every 6 (six) hours as needed for Pain (post-op pain). 42 tablet 0    SPIRIVA WITH HANDIHALER 18 mcg inhalation capsule SMARTSI Puff(s) Via Inhaler Daily      traMADoL (ULTRAM) 50 mg tablet Take 1 tablet (50 mg total) by mouth every 6 (six) hours as needed for Pain. 30 tablet 0    traMADoL (ULTRAM) 50 mg tablet Take 1 tablet (50 mg total) by mouth every 6 (six) hours as needed for Pain. 30 tablet 0    aspirin (ECOTRIN) 81 MG EC tablet Take 1 tablet (81 mg total) by mouth 2 (two) times a day. for 14 days 28 tablet 0     Facility-Administered Encounter Medications as of 2022   Medication Dose Route Frequency Provider Last Rate Last Admin    lactated ringers infusion   Intravenous Continuous Caprice Yin MD   New Bag at 21 1402    methacholine chloride 0 mg/3 mL (0 mg/mL) nebulizer solution - white vial 3 mL  3 mL Nebulization Once Brendan Saenz MD        methacholine chloride 0.1875 mg/3 mL (0.0625 mg/mL) nebulizer solution - red vial 0.1875 mg  3 mL Nebulization Once Brendan Saenz MD        methacholine chloride 0.75 mg/3 mL (0.25 mg/mL) nebulizer solution -  "orange vial 0.75 mg  3 mL Nebulization Once Brendan Saenz MD        methacholine chloride 12 mg/3 mL (4 mg/mL) nebulizer solution - green vial 12 mg  3 mL Nebulization Once Brendan Saenz MD        methacholine chloride 3 mg/3 mL (1 mg/mL) nebulizer solution - yellow vial 3 mg  3 mL Nebulization Once Brendan Saenz MD        methacholine chloride 48 mg/3 mL (16 mg/mL) nebulizer solution - blue vial 48 mg  3 mL Nebulization Once Brendan Saenz MD           Objective:     Vital Signs (Most Recent)  Vital Signs  Pulse: 86  Resp: 16  SpO2: 98 %  BP: 132/80  Height and Weight  Height: 5' 5" (165.1 cm)  Weight: 57.2 kg (126 lb 1.7 oz)  BSA (Calculated - sq m): 1.62 sq meters  BMI (Calculated): 21  Weight in (lb) to have BMI = 25: 149.9]  Wt Readings from Last 2 Encounters:   04/14/22 57.2 kg (126 lb 1.7 oz)   12/03/21 57.2 kg (126 lb 1.7 oz)       Physical Exam   Constitutional: She is oriented to person, place, and time. She appears well-developed and well-nourished.       HENT:   Head: Normocephalic.   Mouth/Throat: Mallampati Score: II.   Neck: No JVD present.   Cardiovascular: Normal rate and intact distal pulses.   No murmur heard.  Pulmonary/Chest: Normal expansion, effort normal and breath sounds normal.   Abdominal: Soft. Bowel sounds are normal.   Musculoskeletal:         General: No edema. Normal range of motion.      Cervical back: Normal range of motion and neck supple.   Lymphadenopathy:     She has no axillary adenopathy.   Neurological: She is alert and oriented to person, place, and time.   Skin: Skin is warm and dry.   Psychiatric: She has a normal mood and affect.   Nursing note and vitals reviewed.      Laboratory  Lab Results   Component Value Date    WBC 8.51 01/05/2021    RBC 4.34 01/05/2021    HGB 14.0 01/05/2021    HGB 14.0 01/05/2021    HCT 43.5 01/05/2021    HCT 43.5 01/05/2021     (H) 01/05/2021    MCH 32.3 (H) 01/05/2021    MCHC 32.2 01/05/2021    RDW 12.4 " 01/05/2021     (H) 01/05/2021    MPV 9.7 01/05/2021    GRAN 5.2 01/05/2021    GRAN 61.0 01/05/2021    LYMPH 2.2 01/05/2021    LYMPH 25.4 01/05/2021    MONO 1.0 01/05/2021    MONO 11.4 01/05/2021    EOS 0.1 01/05/2021    BASO 0.07 01/05/2021    EOSINOPHIL 1.2 01/05/2021    BASOPHIL 0.8 01/05/2021       BMP  Lab Results   Component Value Date     01/22/2021    K 4.0 01/22/2021     01/22/2021    CO2 28 01/22/2021    BUN 12 01/22/2021    CREATININE 0.8 01/22/2021    CALCIUM 9.1 01/22/2021    ANIONGAP 8 01/22/2021    ESTGFRAFRICA >60.0 01/22/2021    EGFRNONAA >60.0 01/22/2021    AST 22 01/22/2021    ALT 13 01/22/2021    PROT 6.6 01/22/2021       No results found for: BNP    No results found for: TSH    No results found for: SEDRATE    No results found for: CRP  No results found for: IGE     Lab Results   Component Value Date    ASPERGILLUS None Detected 10/19/2021     No results found for: AFUMIGATUSCL     Lab Results   Component Value Date    ACE 63 10/19/2021        Diagnostic Results:  I have personally reviewed today the following studies:        Component      Latest Ref Rng & Units 12/3/2021 10/19/2021   Specimen UA        Urine, Clean Catch   Color, UA      Yellow, Straw, Steph  Yellow   Appearance, UA      Clear  Clear   pH, UA      5.0 - 8.0  7.0   Specific Gravity, UA      1.005 - 1.030  1.025   Protein, UA      Negative  Negative   Glucose, UA      Negative  Negative   Ketones, UA      Negative  Negative   Bilirubin (UA)      Negative  Negative   Occult Blood UA      Negative  Negative   NITRITE UA      Negative  Negative   UROBILINOGEN UA      <2.0 EU/dL  Negative   Leukocytes, UA      Negative  Negative   POC PH      7.35 - 7.45 7.466 (H)    POC PCO2      35 - 45 mmHg 33.8 (L)    POC PO2      80 - 100 mmHg 111 (H)    POC HCO3      24 - 28 mmol/L 24.4    POC BE      -2 to 2 mmol/L 1    POC SATURATED O2      95 - 100 % 99    Sample       ARTERIAL    Site       LR    Allens Test       Pass     DelSys       Room Air    Mode       SPONT    FiO2       21    NIL      See text IU/mL  0.040   TB1 - Nil      See text IU/mL  -0.010   TB2 - Nil      See text IU/mL  0.000   Mitogen - Nil      See text IU/mL  >10.000   TB Gold Plus        Negative   Coccidioides      None Detected  None Detected   Aspergillus Antibody      None Detected  None Detected   Blastomyces Antibody      None Detected  None Detected   Histoplasma Ab, Immunodiffusion      None Detected  None Detected   Alpha 1 Antitrypsin Phenotype      bands  MM   Alpha-1 Anti-Trypsin      100 - 190 mg/dL  166   Angio Convert Enzyme      16 - 85 U/L  63   Rheumatoid Factor      0.0 - 15.0 IU/mL  <13.0   A-1 Antitrypsin      100 - 190 mg/dL  159       CT Chest Without Contrast  Narrative: EXAMINATION:  CT CHEST WITHOUT CONTRAST    CLINICAL HISTORY:  follow nodule; Other nonspecific abnormal finding of lung field    TECHNIQUE:  Low dose axial images, sagittal and coronal reformations were obtained from the thoracic inlet to the lung bases. Contrast was not administered.    COMPARISON:  12/15/2020    FINDINGS:  Heart and Great vessels: Within normal limits    Thoracic Adenopathy: None demonstrated    Lungs: There are mild paraseptal emphysematous changes present at the bilateral apices.  Multiple small pulmonary nodules are again seen throughout the lungs bilaterally which appear unchanged.  The largest of these measures an average of 6 mm in the right middle lobe as seen on series 4, image 345.  No new or enlarging nodules demonstrated.  No parenchymal consolidations or pleural effusions.    Upper Abdomen: Prior cholecystectomy and pneumobilia are again noted.    Bones: No acute or suspicious osseous findings.  Prior left shoulder arthroplasty noted.    Miscellaneous: None  Impression: Unchanged appearance of multiple small bilateral pulmonary nodules as above.    Electronically signed by: Eliu Dacosta MD  Date:    04/14/2022  Time:    13:56      Assessment/Plan:     Problem List Items Addressed This Visit     RESOLVED: COPD with asthma    Pulmonary nodules/lesions, multiple - Primary     CT April 2022  No change in nodules  Annual surveilance           Relevant Orders    CT Chest Without Contrast    Tobacco abuse     Smoking cessation               Requested Prescriptions      No prescriptions requested or ordered in this encounter             Methacholine and FeNO pending    Labs today    May need to see ENT    Stable nodules    Follow up in about 6 weeks (around 5/23/2022), or follow up with PCP, follow up after methachoiline, labs today, CT 12 months.    This note was prepared using voice recognition system and is likely to have sound alike errors that may have been overlooked even after proof reading.  Please call me with any questions    Discussed diagnosis, its evaluation, treatment and usual course. All questions answered.      Brendan Saenz MD

## 2022-04-27 ENCOUNTER — PATIENT MESSAGE (OUTPATIENT)
Dept: SMOKING CESSATION | Facility: CLINIC | Age: 57
End: 2022-04-27
Payer: MEDICAID

## 2022-04-29 DIAGNOSIS — M25.511 RIGHT SHOULDER PAIN, UNSPECIFIED CHRONICITY: Primary | ICD-10-CM

## 2022-05-06 ENCOUNTER — HOSPITAL ENCOUNTER (OUTPATIENT)
Dept: RADIOLOGY | Facility: HOSPITAL | Age: 57
Discharge: HOME | End: 2022-05-06
Attending: STUDENT IN AN ORGANIZED HEALTH CARE EDUCATION/TRAINING PROGRAM
Payer: MEDICAID

## 2022-05-06 ENCOUNTER — OFFICE VISIT (OUTPATIENT)
Dept: ORTHOPEDICS | Facility: CLINIC | Age: 57
End: 2022-05-06
Payer: MEDICAID

## 2022-05-06 VITALS — HEIGHT: 65 IN | BODY MASS INDEX: 20.57 KG/M2 | WEIGHT: 123.44 LBS

## 2022-05-06 DIAGNOSIS — M75.21 BICEPS TENDINITIS OF RIGHT UPPER EXTREMITY: ICD-10-CM

## 2022-05-06 DIAGNOSIS — G89.29 CHRONIC RIGHT SHOULDER PAIN: ICD-10-CM

## 2022-05-06 DIAGNOSIS — M25.511 CHRONIC RIGHT SHOULDER PAIN: ICD-10-CM

## 2022-05-06 DIAGNOSIS — M17.12 PRIMARY OSTEOARTHRITIS OF LEFT KNEE: Primary | ICD-10-CM

## 2022-05-06 DIAGNOSIS — M19.011 OSTEOARTHRITIS OF GLENOHUMERAL JOINT, RIGHT: ICD-10-CM

## 2022-05-06 DIAGNOSIS — M25.511 RIGHT SHOULDER PAIN, UNSPECIFIED CHRONICITY: ICD-10-CM

## 2022-05-06 PROCEDURE — 1159F PR MEDICATION LIST DOCUMENTED IN MEDICAL RECORD: ICD-10-PCS | Mod: CPTII,,, | Performed by: STUDENT IN AN ORGANIZED HEALTH CARE EDUCATION/TRAINING PROGRAM

## 2022-05-06 PROCEDURE — 20610 LARGE JOINT ASPIRATION/INJECTION: R SUBACROMIAL BURSA: ICD-10-PCS | Mod: S$PBB,50,, | Performed by: STUDENT IN AN ORGANIZED HEALTH CARE EDUCATION/TRAINING PROGRAM

## 2022-05-06 PROCEDURE — 99214 OFFICE O/P EST MOD 30 MIN: CPT | Mod: S$PBB,25,, | Performed by: STUDENT IN AN ORGANIZED HEALTH CARE EDUCATION/TRAINING PROGRAM

## 2022-05-06 PROCEDURE — 1160F RVW MEDS BY RX/DR IN RCRD: CPT | Mod: CPTII,,, | Performed by: STUDENT IN AN ORGANIZED HEALTH CARE EDUCATION/TRAINING PROGRAM

## 2022-05-06 PROCEDURE — 73030 X-RAY EXAM OF SHOULDER: CPT | Mod: TC,RT

## 2022-05-06 PROCEDURE — 20610 DRAIN/INJ JOINT/BURSA W/O US: CPT | Mod: PBBFAC | Performed by: STUDENT IN AN ORGANIZED HEALTH CARE EDUCATION/TRAINING PROGRAM

## 2022-05-06 PROCEDURE — 99214 OFFICE O/P EST MOD 30 MIN: CPT | Mod: PBBFAC | Performed by: STUDENT IN AN ORGANIZED HEALTH CARE EDUCATION/TRAINING PROGRAM

## 2022-05-06 PROCEDURE — 99214 PR OFFICE/OUTPT VISIT, EST, LEVL IV, 30-39 MIN: ICD-10-PCS | Mod: S$PBB,25,, | Performed by: STUDENT IN AN ORGANIZED HEALTH CARE EDUCATION/TRAINING PROGRAM

## 2022-05-06 PROCEDURE — 73030 X-RAY EXAM OF SHOULDER: CPT | Mod: 26,RT,, | Performed by: RADIOLOGY

## 2022-05-06 PROCEDURE — 99999 PR PBB SHADOW E&M-EST. PATIENT-LVL IV: ICD-10-PCS | Mod: PBBFAC,,, | Performed by: STUDENT IN AN ORGANIZED HEALTH CARE EDUCATION/TRAINING PROGRAM

## 2022-05-06 PROCEDURE — 3008F BODY MASS INDEX DOCD: CPT | Mod: CPTII,,, | Performed by: STUDENT IN AN ORGANIZED HEALTH CARE EDUCATION/TRAINING PROGRAM

## 2022-05-06 PROCEDURE — 1160F PR REVIEW ALL MEDS BY PRESCRIBER/CLIN PHARMACIST DOCUMENTED: ICD-10-PCS | Mod: CPTII,,, | Performed by: STUDENT IN AN ORGANIZED HEALTH CARE EDUCATION/TRAINING PROGRAM

## 2022-05-06 PROCEDURE — 99999 PR PBB SHADOW E&M-EST. PATIENT-LVL IV: CPT | Mod: PBBFAC,,, | Performed by: STUDENT IN AN ORGANIZED HEALTH CARE EDUCATION/TRAINING PROGRAM

## 2022-05-06 PROCEDURE — 3008F PR BODY MASS INDEX (BMI) DOCUMENTED: ICD-10-PCS | Mod: CPTII,,, | Performed by: STUDENT IN AN ORGANIZED HEALTH CARE EDUCATION/TRAINING PROGRAM

## 2022-05-06 PROCEDURE — 73030 XR SHOULDER COMPLETE 2 OR MORE VIEWS RIGHT: ICD-10-PCS | Mod: 26,RT,, | Performed by: RADIOLOGY

## 2022-05-06 PROCEDURE — 1159F MED LIST DOCD IN RCRD: CPT | Mod: CPTII,,, | Performed by: STUDENT IN AN ORGANIZED HEALTH CARE EDUCATION/TRAINING PROGRAM

## 2022-05-06 RX ADMIN — TRIAMCINOLONE ACETONIDE 40 MG: 200 INJECTION, SUSPENSION INTRA-ARTICULAR; INTRAMUSCULAR at 08:05

## 2022-05-06 NOTE — PROGRESS NOTES
Orthopaedics Sports Medicine     Shoulder Initial Visit         5/6/2022    Referring MD: Self    Chief Complaint   Patient presents with    Right Shoulder - Pain, Injury         History of Present Illness:   Radah Ramachandran is a 56 y.o. left-hand dominant female who presents with right shoulder pain and dysfunction.    Onset of the symptoms was 5 months ago     Inciting event: Fall on the right shoulder around January 14, 2022.      Current symptoms include constant aching, throbbing  Sharp pain.      Pain is aggravated by movement especially above shoulder height, sleep, and turning her hands with palms up.     Evaluation to date: X-Ray,      Treatment to date: Rest, activity modification, Mobic, biofreeze, Icy Hot    She also reports left knee pain and swelling and is interested in another injection for her left knee which gave her good relief of her symptoms previously.      Past Medical History:   Past Medical History:   Diagnosis Date    COPD with asthma     Fibromyalgia     H/O psoriatic arthritis     Hx of migraines     Post-traumatic osteoarthritis of left shoulder 12/15/2020    Rheumatoid arthritis        Past Surgical History:   Past Surgical History:   Procedure Laterality Date    ANKLE FUSION Right     APPENDECTOMY      ARTHROPLASTY OF SHOULDER Left 1/25/2021    Procedure: ARTHROPLASTY, SHOULDER;  Surgeon: Ramesh Chau MD;  Location: [facility];  Service: Orthopedics;  Laterality: Left;    CHOLECYSTECTOMY      EYELID LACERATION REPAIR Left     HYSTERECTOMY      OPEN REDUCTION AND INTERNAL FIXATION (ORIF) OF INJURY OF HAND  right    ORIF TIBIA & FIBULA FRACTURES Right        Medications:  Patient's Medications   New Prescriptions    No medications on file   Previous Medications    ALBUTEROL (PROVENTIL/VENTOLIN HFA) 90 MCG/ACTUATION INHALER    INHALE 1 PUFF EVERY 4 HOURS AS NEEDED    ALBUTEROL INHL    Inhale into the lungs.    ASPIRIN (ECOTRIN) 81 MG EC TABLET    Take 1 tablet  "(81 mg total) by mouth 2 (two) times a day. for 14 days    CEFDINIR (OMNICEF) 300 MG CAPSULE    Take 300 mg by mouth once daily.    FAMOTIDINE (PEPCID) 40 MG TABLET    Take 40 mg by mouth 2 (two) times daily.    FLUTICASONE PROPIONATE (FLONASE) 50 MCG/ACTUATION NASAL SPRAY    1 spray by Each Nostril route once daily.    IBUPROFEN (ADVIL,MOTRIN) 800 MG TABLET    Take 1 tablet (800 mg total) by mouth 3 (three) times daily.    LEVOCETIRIZINE (XYZAL) 5 MG TABLET    Take 5 mg by mouth every evening.    LIDOCAINE-PRILOCAINE (EMLA) CREAM        MELOXICAM (MOBIC) 7.5 MG TABLET    TAKE 1 TABLET BY MOUTH EVERY DAY    METHOCARBAMOL (ROBAXIN) 500 MG TAB    Take 2 tablets (1,000 mg total) by mouth 3 (three) times daily as needed (muscle spasms or pain).    NITROFURANTOIN, MACROCRYSTAL-MONOHYDRATE, (MACROBID) 100 MG CAPSULE    Take 100 mg by mouth 2 (two) times daily.    OXYCODONE (ROXICODONE) 5 MG IMMEDIATE RELEASE TABLET    Take 1 tablet (5 mg total) by mouth every 6 (six) hours as needed for Pain (post-op pain).    SPIRIVA WITH HANDIHALER 18 MCG INHALATION CAPSULE    SMARTSI Puff(s) Via Inhaler Daily    TRAMADOL (ULTRAM) 50 MG TABLET    Take 1 tablet (50 mg total) by mouth every 6 (six) hours as needed for Pain.    TRAMADOL (ULTRAM) 50 MG TABLET    Take 1 tablet (50 mg total) by mouth every 6 (six) hours as needed for Pain.   Modified Medications    No medications on file   Discontinued Medications    No medications on file       Allergies:   Review of patient's allergies indicates:   Allergen Reactions    Latex, natural rubber Hives     Pt "forgot' to report allergy until rash noted on L forearm.         Social History:   Home town: [place], [place]  Occupation: [employer]  Alcohol use: She reports previous alcohol use.  Tobacco use: She reports that she has been smoking cigarettes. She has been smoking about 0.50 packs per day. She has never used smokeless tobacco.    Review of systems:  History of recent illness, fevers, " "shakes, or chills: no  History of cardiac problems or chest pain: Yes, high blood pressure  History of pulmonary problems or asthma: no  History of diabetes: no  History of prior dvt or clotting problems: no  History of sleep apnea: no      Physical Examination:  Estimated body mass index is 20.54 kg/m² as calculated from the following:    Height as of this encounter: 5' 5" (1.651 m).    Weight as of this encounter: 56 kg (123 lb 7.3 oz).    General  Healthy appearing female in no acute distress  Alert and oriented, normal mood, appropriate affect    Shoulder Examination:  Patient is alert and oriented, no distress. Skin is intact. Neuro is normal with no focal motor or sensory findings.    Cervical exam is unremarkable. Intact cervical ROM. Negative Spurling's test    Physical Exam:  RIGHT    LEFT    Scap Dyskinesis/Winging (-)    (-)    Tenderness:          Greater Tuberosity             +    (-)  Bicipital Groove  +    (-)  AC joint   (-)    (-)  Other:     ROM:  Forward Elevation 160    160  Abduction  90    90  ER (at side)  50    50  IR   L5        Strength:   Supraspinatus  4+/5    5/5  Infraspinatus  5/5    5/5  Subscap / IR  5/5    5/5     Special Tests:   Neer:    (-)    (-)   Miller:   +    (-)   SS Stress:   +    (-)   Bear Hug:   (-)    (-)   [employer]'s:   +    (-)   Resisted Thrower's:   +    (-)   Cross Arm Abduction:  (-)    (-)    Neurovascular examination  - Motor grossly intact bilaterally to shoulder abduction, elbow flexion and extension, wrist flexion and extension, and intrinsic hand musculature  - Sensation intact to light touch bilaterally in axillary, median, radial, and ulnar distributions  - Symmetrical radial pulses    Left knee:  Skin is intact with no abrasions or erythema  Range of motion 0-130  Tenderness palpation along the mediolateral joint line  TTP lateral patella facet  Positive patella grind  Positive Paula  NVI distally      Imaging:  X-ray Shoulder 2 or More Views " Right  Narrative: EXAMINATION:  XR SHOULDER COMPLETE 2 OR MORE VIEWS RIGHT    CLINICAL HISTORY:  Pain in right shoulder    TECHNIQUE:  Two or three views of the right shoulder were performed.    COMPARISON:  None    FINDINGS:  Mild glenohumeral joint degenerative findings present including osteophyte formation and inferior joint space loss.  AC joint intact.  No acute osseous abnormality.  Lung parenchyma clear.  Lower cervical spine degenerative findings.  Impression: As above    Electronically signed by: Price Rose MD  Date:    05/06/2022  Time:    08:37       Physician Read: I agree with the above impression.      Impression:  56 y.o. female with right shoulder pain, subacromial impingement, mild glenohumeral arthritis; left knee osteoarthritis      Plan:  1. Discussed diagnosis and treatment options with patient today.  She has right shoulder pain consistent with subacromial impingement as well as some glenohumeral arthritis though not as severe as on the left side.  She also has known left knee osteoarthritis.  2. She has tried nonoperative treatments including rest, activity modification, and oral anti-inflammatories.  Despite these interventions she continues to be symptomatic on a daily basis.  3. I recommend proceeding next with corticosteroid injection to the right shoulder subacromial space today.  We will also administer corticosteroid injections left knee.  4. If she continues to be symptomatic, we may consider advanced imaging in the future versus continued injections.  5. Follow up with me as needed           Ramesh Chau MD    I, Rob Fan, acted as a scribe for Ramesh Chau MD for the duration of this office visit.

## 2022-05-12 ENCOUNTER — PATIENT MESSAGE (OUTPATIENT)
Dept: PULMONOLOGY | Facility: CLINIC | Age: 57
End: 2022-05-12
Payer: MEDICAID

## 2022-06-05 RX ORDER — TRIAMCINOLONE ACETONIDE 40 MG/ML
40 INJECTION, SUSPENSION INTRA-ARTICULAR; INTRAMUSCULAR
Status: DISCONTINUED | OUTPATIENT
Start: 2022-05-06 | End: 2022-06-05 | Stop reason: HOSPADM

## 2022-06-05 NOTE — PROCEDURES
Large Joint Aspiration/Injection: R subacromial bursa    Date/Time: 5/6/2022 8:40 AM  Performed by: Ramesh Chau MD  Authorized by: Ramesh Chau MD     Consent Done?:  Yes (Verbal)  Indications:  Pain and arthritis  Site marked: the procedure site was marked    Timeout: prior to procedure the correct patient, procedure, and site was verified    Prep: patient was prepped and draped in usual sterile fashion      Local anesthesia used?: Yes    Anesthesia:  Local infiltration  Local anesthetic:  Lidocaine 1% without epinephrine    Details:  Needle Size:  22 G  Ultrasonic Guidance for needle placement?: No    Approach:  Posterior  Location:  Shoulder  Site:  R subacromial bursa  Medications:  40 mg triamcinolone acetonide 40 mg/mL  Patient tolerance:  Patient tolerated the procedure well with no immediate complications  Large Joint Aspiration/Injection: L knee    Date/Time: 5/6/2022 8:40 AM  Performed by: Ramesh Chau MD  Authorized by: Ramesh Chau MD     Consent Done?:  Yes (Verbal)  Indications:  Pain  Site marked: the procedure site was marked    Timeout: prior to procedure the correct patient, procedure, and site was verified    Prep: patient was prepped and draped in usual sterile fashion      Local anesthesia used?: Yes    Anesthesia:  Local infiltration  Local anesthetic:  Lidocaine 1% without epinephrine    Details:  Needle Size:  22 G  Ultrasonic Guidance for needle placement?: No    Approach:  Anterolateral  Location:  Knee  Site:  L knee  Medications:  40 mg triamcinolone acetonide 40 mg/mL  Patient tolerance:  Patient tolerated the procedure well with no immediate complications

## 2022-06-06 ENCOUNTER — PATIENT MESSAGE (OUTPATIENT)
Dept: ORTHOPEDICS | Facility: CLINIC | Age: 57
End: 2022-06-06
Payer: MEDICAID

## 2022-06-13 ENCOUNTER — TELEPHONE (OUTPATIENT)
Dept: PULMONOLOGY | Facility: CLINIC | Age: 57
End: 2022-06-13
Payer: MEDICAID

## 2022-06-13 NOTE — TELEPHONE ENCOUNTER
----- Message from Cheryl Dickerson sent at 6/13/2022  9:03 AM CDT -----  Regarding: reschedule  Contact: pt  Pt needs to reschedule appt. Please call pt at [phone]

## 2022-06-17 ENCOUNTER — TELEPHONE (OUTPATIENT)
Dept: PULMONOLOGY | Facility: CLINIC | Age: 57
End: 2022-06-17
Payer: MEDICAID

## 2022-06-17 NOTE — TELEPHONE ENCOUNTER
----- Message from Asha Acosta sent at 6/17/2022 12:15 PM CDT -----  Please cancel pt appt today, pt states she will not make it to appt, call pt @ [phone] to reschedule.

## 2022-06-21 ENCOUNTER — OFFICE VISIT (OUTPATIENT)
Dept: ORTHOPEDICS | Facility: CLINIC | Age: 57
End: 2022-06-21
Payer: MEDICAID

## 2022-06-21 VITALS — WEIGHT: 123 LBS | BODY MASS INDEX: 20.49 KG/M2 | HEIGHT: 65 IN

## 2022-06-21 DIAGNOSIS — M25.511 RIGHT SHOULDER PAIN, UNSPECIFIED CHRONICITY: ICD-10-CM

## 2022-06-21 DIAGNOSIS — M19.011 OSTEOARTHRITIS OF GLENOHUMERAL JOINT, RIGHT: Primary | ICD-10-CM

## 2022-06-21 DIAGNOSIS — M66.321 NONTRAUMATIC RUPTURE OF RIGHT LONG HEAD BICEPS TENDON: ICD-10-CM

## 2022-06-21 DIAGNOSIS — M75.21 BICEPS TENDINITIS OF RIGHT UPPER EXTREMITY: ICD-10-CM

## 2022-06-21 DIAGNOSIS — M67.911 TENDINOPATHY OF RIGHT ROTATOR CUFF: Primary | ICD-10-CM

## 2022-06-21 PROCEDURE — 99999 PR PBB SHADOW E&M-EST. PATIENT-LVL IV: ICD-10-PCS | Mod: PBBFAC,,, | Performed by: STUDENT IN AN ORGANIZED HEALTH CARE EDUCATION/TRAINING PROGRAM

## 2022-06-21 PROCEDURE — 1160F RVW MEDS BY RX/DR IN RCRD: CPT | Mod: CPTII,,, | Performed by: STUDENT IN AN ORGANIZED HEALTH CARE EDUCATION/TRAINING PROGRAM

## 2022-06-21 PROCEDURE — 99214 OFFICE O/P EST MOD 30 MIN: CPT | Mod: S$PBB,,, | Performed by: STUDENT IN AN ORGANIZED HEALTH CARE EDUCATION/TRAINING PROGRAM

## 2022-06-21 PROCEDURE — 1159F PR MEDICATION LIST DOCUMENTED IN MEDICAL RECORD: ICD-10-PCS | Mod: CPTII,,, | Performed by: STUDENT IN AN ORGANIZED HEALTH CARE EDUCATION/TRAINING PROGRAM

## 2022-06-21 PROCEDURE — 99999 PR PBB SHADOW E&M-EST. PATIENT-LVL IV: CPT | Mod: PBBFAC,,, | Performed by: STUDENT IN AN ORGANIZED HEALTH CARE EDUCATION/TRAINING PROGRAM

## 2022-06-21 PROCEDURE — 3008F BODY MASS INDEX DOCD: CPT | Mod: CPTII,,, | Performed by: STUDENT IN AN ORGANIZED HEALTH CARE EDUCATION/TRAINING PROGRAM

## 2022-06-21 PROCEDURE — 1159F MED LIST DOCD IN RCRD: CPT | Mod: CPTII,,, | Performed by: STUDENT IN AN ORGANIZED HEALTH CARE EDUCATION/TRAINING PROGRAM

## 2022-06-21 PROCEDURE — 3008F PR BODY MASS INDEX (BMI) DOCUMENTED: ICD-10-PCS | Mod: CPTII,,, | Performed by: STUDENT IN AN ORGANIZED HEALTH CARE EDUCATION/TRAINING PROGRAM

## 2022-06-21 PROCEDURE — 1160F PR REVIEW ALL MEDS BY PRESCRIBER/CLIN PHARMACIST DOCUMENTED: ICD-10-PCS | Mod: CPTII,,, | Performed by: STUDENT IN AN ORGANIZED HEALTH CARE EDUCATION/TRAINING PROGRAM

## 2022-06-21 PROCEDURE — 99214 OFFICE O/P EST MOD 30 MIN: CPT | Mod: PBBFAC | Performed by: STUDENT IN AN ORGANIZED HEALTH CARE EDUCATION/TRAINING PROGRAM

## 2022-06-21 PROCEDURE — 99214 PR OFFICE/OUTPT VISIT, EST, LEVL IV, 30-39 MIN: ICD-10-PCS | Mod: S$PBB,,, | Performed by: STUDENT IN AN ORGANIZED HEALTH CARE EDUCATION/TRAINING PROGRAM

## 2022-06-21 RX ORDER — ESCITALOPRAM OXALATE 20 MG/1
20 TABLET ORAL DAILY
COMMUNITY

## 2022-06-21 NOTE — PROGRESS NOTES
Orthopaedic Follow-Up Visit    Last Appointment: 05/06/2022  Diagnosis: Osteoarthritis of glenohumeral joint, right           Biceps tendinitis of right upper extremity  Prior Procedure: Large Joint Aspiration/Injection: R subacromial bursa    Radha Ramachandran is a 56 y.o. female who is here for f/u evaluation of the right shoulder. The patient was last seen here by me on 05/06/2022 at which point we decided to proceed with a corticosteroid injection in the right subacromial bursa and the left knee prior to considering further treatment options. The patient returns today reporting that the symptoms have worsened, she felt a pop in her right shoulder, and is interested in proceeding with further options.     To review her history, Radha Ramachandran is a 56 y.o. left-hand dominant female who presents with right shoulder pain and dysfunction. Onset of the symptoms was 5 months ago. Fall on the right shoulder around January 14, 2022. Current symptoms include constant aching, throbbing  Sharp pain. Pain is aggravated by movement especially above shoulder height, sleep, and turning her hands with palms up.     Patient's medications, allergies, past medical, surgical, social and family histories were reviewed and updated as appropriate.    Review of Systems   All systems reviewed were negative.  Specifically, the patient denies fever, chills, weight loss, chest pain, shortness of breath, or dyspnea on exertion.      Past Medical History:   Diagnosis Date    COPD with asthma     Fibromyalgia     H/O psoriatic arthritis     Hx of migraines     Post-traumatic osteoarthritis of left shoulder 12/15/2020    Rheumatoid arthritis        Past Surgical History:   Procedure Laterality Date    ANKLE FUSION Right     APPENDECTOMY      ARTHROPLASTY OF SHOULDER Left 1/25/2021    Procedure: ARTHROPLASTY, SHOULDER;  Surgeon: Ramesh Chau MD;  Location: [facility];  Service: Orthopedics;  Laterality: Left;     CHOLECYSTECTOMY      EYELID LACERATION REPAIR Left     HYSTERECTOMY      OPEN REDUCTION AND INTERNAL FIXATION (ORIF) OF INJURY OF HAND  right    ORIF TIBIA & FIBULA FRACTURES Right        Patient's Medications   New Prescriptions    No medications on file   Previous Medications    ALBUTEROL (PROVENTIL/VENTOLIN HFA) 90 MCG/ACTUATION INHALER    INHALE 1 PUFF EVERY 4 HOURS AS NEEDED    ALBUTEROL INHL    Inhale into the lungs.    ASPIRIN (ECOTRIN) 81 MG EC TABLET    Take 1 tablet (81 mg total) by mouth 2 (two) times a day. for 14 days    CEFDINIR (OMNICEF) 300 MG CAPSULE    Take 300 mg by mouth once daily.    ESCITALOPRAM OXALATE (LEXAPRO) 20 MG TABLET    Take 20 mg by mouth once daily.    FAMOTIDINE (PEPCID) 40 MG TABLET    Take 40 mg by mouth 2 (two) times daily.    FLUTICASONE PROPIONATE (FLONASE) 50 MCG/ACTUATION NASAL SPRAY    1 spray by Each Nostril route once daily.    IBUPROFEN (ADVIL,MOTRIN) 800 MG TABLET    Take 1 tablet (800 mg total) by mouth 3 (three) times daily.    LEVOCETIRIZINE (XYZAL) 5 MG TABLET    Take 5 mg by mouth every evening.    LIDOCAINE-PRILOCAINE (EMLA) CREAM        MELOXICAM (MOBIC) 7.5 MG TABLET    TAKE 1 TABLET BY MOUTH EVERY DAY    METHOCARBAMOL (ROBAXIN) 500 MG TAB    Take 2 tablets (1,000 mg total) by mouth 3 (three) times daily as needed (muscle spasms or pain).    NITROFURANTOIN, MACROCRYSTAL-MONOHYDRATE, (MACROBID) 100 MG CAPSULE    Take 100 mg by mouth 2 (two) times daily.    OXYCODONE (ROXICODONE) 5 MG IMMEDIATE RELEASE TABLET    Take 1 tablet (5 mg total) by mouth every 6 (six) hours as needed for Pain (post-op pain).    SPIRIVA WITH HANDIHALER 18 MCG INHALATION CAPSULE    SMARTSI Puff(s) Via Inhaler Daily    TRAMADOL (ULTRAM) 50 MG TABLET    Take 1 tablet (50 mg total) by mouth every 6 (six) hours as needed for Pain.    TRAMADOL (ULTRAM) 50 MG TABLET    Take 1 tablet (50 mg total) by mouth every 6 (six) hours as needed for Pain.   Modified Medications    No medications on  "file   Discontinued Medications    No medications on file       History reviewed. No pertinent family history.    Review of patient's allergies indicates:   Allergen Reactions    Latex, natural rubber Hives     Pt "forgot' to report allergy until rash noted on L forearm.           Objective:      Physical Exam  Patient is alert and oriented, no distress. Skin is intact. Neuro is normal with no focal motor or sensory findings.    Cervical exam is unremarkable. Intact cervical ROM. Negative Spurling's test    Physical Exam:  RIGHT    LEFT    Scap Dyskinesis/Winging (-)    (-)    Tenderness:          Greater Tuberosity  +    (-)  Bicipital Groove  +    (-)  AC joint   (-)    (-)  Other:     ROM:  Forward Elevation 180    180  Abduction  120    120  ER (at side)  80    80  IR   T8    T8    Strength:   Supraspinatus  4/5    5/5  Infraspinatus  4+/5    5/5  Subscap / IR  5/5    5/5     Special Tests:   Neer:    (-)    (-)   Miller:   +    (-)   SS Stress:   +    (-)   Bear Hug:   (-)    (-)   [place]'s:   +    (-)   Resisted Thrower's:   +    (-)   Cross Arm Abduction:  (-)    (-)    Clinical evidence of right long head biceps tendon rupture    Imaging:    X-ray Shoulder 2 or More Views Right  Narrative: EXAMINATION:  XR SHOULDER COMPLETE 2 OR MORE VIEWS RIGHT    CLINICAL HISTORY:  Pain in right shoulder    TECHNIQUE:  Two or three views of the right shoulder were performed.    COMPARISON:  None    FINDINGS:  Mild glenohumeral joint degenerative findings present including osteophyte formation and inferior joint space loss.  AC joint intact.  No acute osseous abnormality.  Lung parenchyma clear.  Lower cervical spine degenerative findings.  Impression: As above    Electronically signed by: Price Rose MD  Date:    05/06/2022  Time:    08:37       Physician read: I agree with the above impression.    Assessment/Plan:   Radha Ramachandran is a 56 y.o. female with right shoulder subacromial impingement, suspected " rotator cuff tear, long head biceps tendon rupture    Plan:    1. Discussed diagnosis and treatment options with her today.  She has had rupture of her long head biceps tendon.  She also has continued shoulder pain consistent with rotator cuff tear.  2. We have tried multiple nonoperative treatments including rest, activity modification, oral anti-inflammatories, and corticosteroid injection.  Despite these interventions, she continues to be symptomatic.  3. Recommend MRI of the right shoulder for evaluation of rotator cuff pathology  4. I did  her that isolated long head biceps tendon rupture is generally treated non operatively and that if there is no further rotator cuff damage that I think her symptoms will continue to improve.  5. Follow up with me after MRI          Ramesh Chau MD

## 2022-06-28 ENCOUNTER — TELEPHONE (OUTPATIENT)
Dept: PULMONOLOGY | Facility: CLINIC | Age: 57
End: 2022-06-28
Payer: MEDICAID

## 2022-07-15 ENCOUNTER — TELEPHONE (OUTPATIENT)
Dept: PULMONOLOGY | Facility: CLINIC | Age: 57
End: 2022-07-15
Payer: MEDICAID

## 2022-07-15 NOTE — TELEPHONE ENCOUNTER
----- Message from Asha Acosta sent at 7/15/2022  9:43 AM CDT -----  Please call pt @ [phone] regarding procedure on 7/22, pt need to change time to later appt around 2:30-3 or a different date.

## 2022-07-15 NOTE — TELEPHONE ENCOUNTER
----- Message from Analy Wright sent at 7/15/2022  3:17 PM CDT -----  Contact: radha  .Type:  Patient Returning Call    Who Called:Radha  Who Left Message for Patient:Connie  Does the patient know what this is regarding?:procedure 07/22  Would the patient rather a call back or a response via [facility]? Call back  Best Call Back Number:[phone]  Additional Information: n/a          Thanks  DD

## 2022-08-08 ENCOUNTER — TELEPHONE (OUTPATIENT)
Dept: ORTHOPEDICS | Facility: CLINIC | Age: 57
End: 2022-08-08
Payer: MEDICAID

## 2022-08-08 NOTE — TELEPHONE ENCOUNTER
Spoke with patient in regards to her message informing us that she would need to reschedule her R shoulder MRI review previously scheduled for tomorrow, 8/9 at 7:40am dt being diagnosed with Covid-19 last Saturday. Rescheduled her appointment to 9/13 at 9:40am. Pt stated that she would also need to reschedule her MRI, so I provided her with the number to radiology to reschedule.

## 2022-08-10 ENCOUNTER — PATIENT MESSAGE (OUTPATIENT)
Dept: PULMONOLOGY | Facility: CLINIC | Age: 57
End: 2022-08-10
Payer: MEDICAID

## 2022-08-15 ENCOUNTER — TELEPHONE (OUTPATIENT)
Dept: PULMONOLOGY | Facility: CLINIC | Age: 57
End: 2022-08-15
Payer: MEDICAID

## 2022-08-15 NOTE — TELEPHONE ENCOUNTER
Tried calling pt three times , no answer phone kept saying pt is unavailable and try call again later.

## 2022-08-15 NOTE — TELEPHONE ENCOUNTER
----- Message from Sumi Camacho sent at 8/15/2022 10:19 AM CDT -----  Contact: [phone]  Pt would like to consult with a nurse in regards to rescheduling her labs and Dr garcia. Please call her back at [phone]. Thanks KB

## 2022-09-23 ENCOUNTER — PATIENT MESSAGE (OUTPATIENT)
Dept: ORTHOPEDICS | Facility: CLINIC | Age: 57
End: 2022-09-23
Payer: MEDICAID

## 2022-10-18 ENCOUNTER — TELEPHONE (OUTPATIENT)
Dept: ORTHOPEDICS | Facility: CLINIC | Age: 57
End: 2022-10-18
Payer: MEDICAID

## 2022-10-18 NOTE — TELEPHONE ENCOUNTER
Spoke with pt regarding approval of her MRI. Let her know that we are working on approval and she can hold her appointment due to a request for injections. -NS    ----- Message from Niels Reno sent at 10/18/2022 10:09 AM CDT -----  Contact: Patient  Radha Ramachandran would like  call back at [phone], in regards to her insurance denying the MRI. Pt would like to know what her next steps should be.

## 2022-10-20 ENCOUNTER — TELEPHONE (OUTPATIENT)
Dept: ORTHOPEDICS | Facility: CLINIC | Age: 57
End: 2022-10-20
Payer: MEDICAID

## 2022-10-20 DIAGNOSIS — M25.561 RIGHT KNEE PAIN, UNSPECIFIED CHRONICITY: Primary | ICD-10-CM

## 2022-10-20 NOTE — TELEPHONE ENCOUNTER
Called patient to let her know that we have scheduled right knee x-rays on 10/25 at 7:30am prior to her appointment with Dr. Chau at 8am.

## 2022-10-25 ENCOUNTER — HOSPITAL ENCOUNTER (OUTPATIENT)
Dept: RADIOLOGY | Facility: HOSPITAL | Age: 57
Discharge: HOME | End: 2022-10-25
Attending: STUDENT IN AN ORGANIZED HEALTH CARE EDUCATION/TRAINING PROGRAM
Payer: MEDICAID

## 2022-10-25 ENCOUNTER — OFFICE VISIT (OUTPATIENT)
Dept: ORTHOPEDICS | Facility: CLINIC | Age: 57
End: 2022-10-25
Payer: MEDICAID

## 2022-10-25 VITALS — HEIGHT: 65 IN | BODY MASS INDEX: 20.49 KG/M2 | WEIGHT: 123 LBS

## 2022-10-25 DIAGNOSIS — M17.12 PRIMARY OSTEOARTHRITIS OF LEFT KNEE: ICD-10-CM

## 2022-10-25 DIAGNOSIS — M66.321 NONTRAUMATIC RUPTURE OF RIGHT LONG HEAD BICEPS TENDON: ICD-10-CM

## 2022-10-25 DIAGNOSIS — M25.561 RIGHT KNEE PAIN, UNSPECIFIED CHRONICITY: ICD-10-CM

## 2022-10-25 DIAGNOSIS — M75.101 NONTRAUMATIC TEAR OF RIGHT ROTATOR CUFF, UNSPECIFIED TEAR EXTENT: Primary | ICD-10-CM

## 2022-10-25 PROCEDURE — 20610 LARGE JOINT ASPIRATION/INJECTION: R SUBACROMIAL BURSA: ICD-10-PCS | Mod: S$PBB,50,, | Performed by: STUDENT IN AN ORGANIZED HEALTH CARE EDUCATION/TRAINING PROGRAM

## 2022-10-25 PROCEDURE — 99214 PR OFFICE/OUTPT VISIT, EST, LEVL IV, 30-39 MIN: ICD-10-PCS | Mod: S$PBB,25,, | Performed by: STUDENT IN AN ORGANIZED HEALTH CARE EDUCATION/TRAINING PROGRAM

## 2022-10-25 PROCEDURE — 1159F MED LIST DOCD IN RCRD: CPT | Mod: CPTII,,, | Performed by: STUDENT IN AN ORGANIZED HEALTH CARE EDUCATION/TRAINING PROGRAM

## 2022-10-25 PROCEDURE — 99999 PR PBB SHADOW E&M-EST. PATIENT-LVL IV: CPT | Mod: PBBFAC,,, | Performed by: STUDENT IN AN ORGANIZED HEALTH CARE EDUCATION/TRAINING PROGRAM

## 2022-10-25 PROCEDURE — 20610 DRAIN/INJ JOINT/BURSA W/O US: CPT | Mod: PBBFAC | Performed by: STUDENT IN AN ORGANIZED HEALTH CARE EDUCATION/TRAINING PROGRAM

## 2022-10-25 PROCEDURE — 73562 XR KNEE ORTHO RIGHT WITH FLEXION: ICD-10-PCS | Mod: 26,LT,, | Performed by: RADIOLOGY

## 2022-10-25 PROCEDURE — 99999 PR PBB SHADOW E&M-EST. PATIENT-LVL IV: ICD-10-PCS | Mod: PBBFAC,,, | Performed by: STUDENT IN AN ORGANIZED HEALTH CARE EDUCATION/TRAINING PROGRAM

## 2022-10-25 PROCEDURE — 1160F PR REVIEW ALL MEDS BY PRESCRIBER/CLIN PHARMACIST DOCUMENTED: ICD-10-PCS | Mod: CPTII,,, | Performed by: STUDENT IN AN ORGANIZED HEALTH CARE EDUCATION/TRAINING PROGRAM

## 2022-10-25 PROCEDURE — 1159F PR MEDICATION LIST DOCUMENTED IN MEDICAL RECORD: ICD-10-PCS | Mod: CPTII,,, | Performed by: STUDENT IN AN ORGANIZED HEALTH CARE EDUCATION/TRAINING PROGRAM

## 2022-10-25 PROCEDURE — 73564 X-RAY EXAM KNEE 4 OR MORE: CPT | Mod: 26,RT,, | Performed by: RADIOLOGY

## 2022-10-25 PROCEDURE — 73564 XR KNEE ORTHO RIGHT WITH FLEXION: ICD-10-PCS | Mod: 26,RT,, | Performed by: RADIOLOGY

## 2022-10-25 PROCEDURE — 99214 OFFICE O/P EST MOD 30 MIN: CPT | Mod: S$PBB,25,, | Performed by: STUDENT IN AN ORGANIZED HEALTH CARE EDUCATION/TRAINING PROGRAM

## 2022-10-25 PROCEDURE — 99214 OFFICE O/P EST MOD 30 MIN: CPT | Mod: PBBFAC,25 | Performed by: STUDENT IN AN ORGANIZED HEALTH CARE EDUCATION/TRAINING PROGRAM

## 2022-10-25 PROCEDURE — 1160F RVW MEDS BY RX/DR IN RCRD: CPT | Mod: CPTII,,, | Performed by: STUDENT IN AN ORGANIZED HEALTH CARE EDUCATION/TRAINING PROGRAM

## 2022-10-25 PROCEDURE — 73562 X-RAY EXAM OF KNEE 3: CPT | Mod: 26,LT,, | Performed by: RADIOLOGY

## 2022-10-25 PROCEDURE — 20610 DRAIN/INJ JOINT/BURSA W/O US: CPT | Mod: PBBFAC,50 | Performed by: STUDENT IN AN ORGANIZED HEALTH CARE EDUCATION/TRAINING PROGRAM

## 2022-10-25 PROCEDURE — 73562 X-RAY EXAM OF KNEE 3: CPT | Mod: TC,59,LT

## 2022-10-25 RX ORDER — TRIAMCINOLONE ACETONIDE 40 MG/ML
40 INJECTION, SUSPENSION INTRA-ARTICULAR; INTRAMUSCULAR
Status: DISCONTINUED | OUTPATIENT
Start: 2022-10-25 | End: 2022-10-25 | Stop reason: HOSPADM

## 2022-10-25 RX ADMIN — TRIAMCINOLONE ACETONIDE 40 MG: 200 INJECTION, SUSPENSION INTRA-ARTICULAR; INTRAMUSCULAR at 08:10

## 2022-10-25 NOTE — PATIENT INSTRUCTIONS
You received a steroid injection today. The injection also contained some numbing medicine in it as well. This medicine will likely make the area feel better for the next few hours but the pain may return as numbing medicine wears off. This is normal.  The steroid can take anywhere from 1-3 weeks before your body gets the full effect from it.   Please do not soak for the next 24 hours following this injection. This includes no hot tub, bath tub, whirlpool, or submerging the area under water for a 24 hour period. IT IS OK TO SHOWER TODAY.   You may use ice on the area if it is sore/painful after the injection. You can ice for 20 minutes and then allow 1 hour for the body to warm-up and then may ice again.   You may also take Tylenol and/or ibuprofen as needed for any pain or soreness you may experience after the injection.   Your blood sugars can be elevated after an injection. Make sure to properly check sugars over the next few days and contact their PCP if there are any concerns.    If you experience any fevers, chills, red, warm, tender joint at the area of injection, please seek medical care immediately.

## 2022-10-25 NOTE — PROCEDURES
Large Joint Aspiration/Injection: R subacromial bursa    Date/Time: 10/25/2022 8:00 AM  Performed by: Ramesh Chau MD  Authorized by: Ramesh Chau MD     Consent Done?:  Yes (Verbal)  Indications:  Pain  Site marked: the procedure site was marked    Timeout: prior to procedure the correct patient, procedure, and site was verified    Prep: patient was prepped and draped in usual sterile fashion      Local anesthesia used?: Yes    Anesthesia:  Local infiltration  Local anesthetic:  Lidocaine 1% without epinephrine    Details:  Needle Size:  22 G  Ultrasonic Guidance for needle placement?: No    Approach:  Posterior  Location:  Shoulder  Site:  R subacromial bursa  Medications:  40 mg triamcinolone acetonide 40 mg/mL  Patient tolerance:  Patient tolerated the procedure well with no immediate complications  Large Joint Aspiration/Injection: L knee    Date/Time: 10/25/2022 8:00 AM  Performed by: Ramesh Chau MD  Authorized by: Ramesh Chau MD     Consent Done?:  Yes (Verbal)  Indications:  Pain  Site marked: the procedure site was marked    Timeout: prior to procedure the correct patient, procedure, and site was verified    Prep: patient was prepped and draped in usual sterile fashion      Local anesthesia used?: Yes    Anesthesia:  Local infiltration  Local anesthetic:  Lidocaine 1% without epinephrine    Details:  Needle Size:  22 G  Ultrasonic Guidance for needle placement?: No    Approach:  Anterolateral  Location:  Knee  Site:  L knee  Medications:  40 mg triamcinolone acetonide 40 mg/mL  Patient tolerance:  Patient tolerated the procedure well with no immediate complications

## 2022-10-25 NOTE — PROGRESS NOTES
Orthopaedic Follow-Up Visit    Last Appointment: 6/21/22  Diagnosis: Right shoulder subacromial impingement, suspected rotator cuff tear, long head biceps tendon rupture  Prior Procedure: MRI    Radha Ramachandran is a 57 y.o. female who is here for f/u evaluation of her left knee and right shoulder. The patient was last seen here by me on 6/21/22 at which point we decided to send her for an MRI of the right shoulder prior to considering further treatment options. The patient returns today reporting that the symptoms have persisted and is interested in discussing further treatment options.     To review her history, she has known left knee osteoarthritis and right shoulder long head biceps tendon rupture and suspected rotator cuff tear. We previously ordered MRI of the right shoulder but the patient has yet to complete this. She is ready to proceed getting the MRI scheduled. She is also ready to proceed with knee replacement surgery.     Patient's medications, allergies, past medical, surgical, social and family histories were reviewed and updated as appropriate.    Review of Systems   All systems reviewed were negative.  Specifically, the patient denies fever, chills, weight loss, chest pain, shortness of breath, or dyspnea on exertion.      Past Medical History:   Diagnosis Date    COPD with asthma     Fibromyalgia     H/O psoriatic arthritis     Hx of migraines     Post-traumatic osteoarthritis of left shoulder 12/15/2020    Rheumatoid arthritis        Past Surgical History:   Procedure Laterality Date    ANKLE FUSION Right     APPENDECTOMY      ARTHROPLASTY OF SHOULDER Left 1/25/2021    Procedure: ARTHROPLASTY, SHOULDER;  Surgeon: Ramesh Chau MD;  Location: [facility];  Service: Orthopedics;  Laterality: Left;    CHOLECYSTECTOMY      EYELID LACERATION REPAIR Left     HYSTERECTOMY      OPEN REDUCTION AND INTERNAL FIXATION (ORIF) OF INJURY OF HAND  right    ORIF TIBIA & FIBULA FRACTURES Right         Patient's Medications   New Prescriptions    No medications on file   Previous Medications    ALBUTEROL (PROVENTIL/VENTOLIN HFA) 90 MCG/ACTUATION INHALER    INHALE 1 PUFF EVERY 4 HOURS AS NEEDED    ALBUTEROL INHL    Inhale into the lungs.    ASPIRIN (ECOTRIN) 81 MG EC TABLET    Take 1 tablet (81 mg total) by mouth 2 (two) times a day. for 14 days    CEFDINIR (OMNICEF) 300 MG CAPSULE    Take 300 mg by mouth once daily.    ESCITALOPRAM OXALATE (LEXAPRO) 20 MG TABLET    Take 20 mg by mouth once daily.    FAMOTIDINE (PEPCID) 40 MG TABLET    Take 40 mg by mouth 2 (two) times daily.    FLUTICASONE PROPIONATE (FLONASE) 50 MCG/ACTUATION NASAL SPRAY    1 spray by Each Nostril route once daily.    IBUPROFEN (ADVIL,MOTRIN) 800 MG TABLET    Take 1 tablet (800 mg total) by mouth 3 (three) times daily.    LEVOCETIRIZINE (XYZAL) 5 MG TABLET    Take 5 mg by mouth every evening.    LIDOCAINE-PRILOCAINE (EMLA) CREAM        MELOXICAM (MOBIC) 7.5 MG TABLET    TAKE 1 TABLET BY MOUTH EVERY DAY    METHOCARBAMOL (ROBAXIN) 500 MG TAB    Take 2 tablets (1,000 mg total) by mouth 3 (three) times daily as needed (muscle spasms or pain).    NITROFURANTOIN, MACROCRYSTAL-MONOHYDRATE, (MACROBID) 100 MG CAPSULE    Take 100 mg by mouth 2 (two) times daily.    OXYCODONE (ROXICODONE) 5 MG IMMEDIATE RELEASE TABLET    Take 1 tablet (5 mg total) by mouth every 6 (six) hours as needed for Pain (post-op pain).    SPIRIVA WITH HANDIHALER 18 MCG INHALATION CAPSULE    SMARTSI Puff(s) Via Inhaler Daily    TRAMADOL (ULTRAM) 50 MG TABLET    Take 1 tablet (50 mg total) by mouth every 6 (six) hours as needed for Pain.    TRAMADOL (ULTRAM) 50 MG TABLET    Take 1 tablet (50 mg total) by mouth every 6 (six) hours as needed for Pain.   Modified Medications    No medications on file   Discontinued Medications    No medications on file       History reviewed. No pertinent family history.    Review of patient's allergies indicates:   Allergen Reactions    Latex,  "natural rubber Hives     Pt "forgot' to report allergy until rash noted on L forearm.           Objective:      Physical Exam  Patient is alert and oriented, no distress. Skin is intact. Neuro is normal with no focal motor or sensory findings.    Cervical exam is unremarkable. Intact cervical ROM. Negative Spurling's test    Physical Exam:                       RIGHT                                     LEFT     Scap Dyskinesis/Winging       (-)                                             (-)     Tenderness:                                                                              Greater Tuberosity                  +                                              (-)  Bicipital Groove                       +                                              (-)  AC joint                                   (-)                                             (-)  Other:      ROM:  Forward Elevation       180                                          180  Abduction                    120                                          120  ER (at side)                 80                                            80  IR                                 T8                                            T8     Strength:   Supraspinatus             4/5                                           5/5  Infraspinatus               4+/5                                         5/5  Subscap / IR               5/5                                           5/5      Special Tests:              Neer:                                       (-)                                             (-)              Miller:                                 +                                              (-)              SS Stress:                               +                                              (-)              Bear Hug:                                (-)                                             (-)              Hoffmeister's:                                 + "                                              (-)              Resisted Thrower's:                +                                              (-)              Cross Arm Abduction:             (-)                                             (-)     Clinical evidence of right long head biceps tendon rupture    Neurovascular examination  - Motor grossly intact bilaterally to shoulder abduction, elbow flexion and extension, wrist flexion and extension, and intrinsic hand musculature  - Sensation intact to light touch bilaterally in axillary, median, radial, and ulnar distributions  - Symmetrical radial pulses    Knee      RIGHT  LEFT  Skin:     Intact   Intact  ROM:     0-130  3-130  Effusion:    Neg   Neg  Medial joint line tenderness:  Neg   Neg  Lateral joint line tenderness:  Neg   +  Paula:     Neg   +  Patella crepitus:   Neg   Neg  Patella tenderness:   Neg   +  Patella grind:      Neg     +  Lachman:    Neg   Neg  Pivot shift:    Neg   Neg  Valgus stress:    Neg   Neg  Varus stress:    Neg   Neg  Posterior drawer:   Neg   Neg  N-V               intact  intact  Hip:    nml    nml   Lower extremity edema: Negative Negative    Chronic arthritic deformity of knee.     Neurovascular exam  - motor function grossly intact bilaterally to hip flexion, knee extension and flexion, ankle dorsiflexion and plantarflexion  - sensation intact to light touch bilaterally to femoral, tibial, tibial and peroneal distributions  - symmetrical pedal pulses     Imaging:    XR Results:  Results for orders placed during the hospital encounter of 05/06/22    X-ray Shoulder 2 or More Views Right    Narrative  EXAMINATION:  XR SHOULDER COMPLETE 2 OR MORE VIEWS RIGHT    CLINICAL HISTORY:  Pain in right shoulder    TECHNIQUE:  Two or three views of the right shoulder were performed.    COMPARISON:  None    FINDINGS:  Mild glenohumeral joint degenerative findings present including osteophyte formation and inferior joint space loss.  AC  joint intact.  No acute osseous abnormality.  Lung parenchyma clear.  Lower cervical spine degenerative findings.    Impression  As above      Electronically signed by: Price Rose MD  Date:    05/06/2022  Time:    08:37    XR KNEE ORTHO LEFT WITH FLEXION     CLINICAL HISTORY:  . Pain in left knee     TECHNIQUE:  AP standing view of both knees, PA flexion standing views of both knees, and Merchant views of both knees were performed. A lateral view of the left knee was also performed.     COMPARISON:  None     FINDINGS:  Generalized osteopenia and bilateral tricompartment degenerative change, greater on the left.  Calcific densities project through the condylar notch is bilaterally suggesting loose bodies.  Prominent degenerative change left patellofemoral joint.  No effusion.  No fracture or dislocation.     Impression:     As above        Electronically signed by: Audi Foote MD  Date:                                            04/17/2021  Time:                                           11:38    MRI Results:  No results found for this or any previous visit.    CT Results:  Results for orders placed during the hospital encounter of 01/05/21    CT Shoulder Without Contrast Left    Narrative  EXAMINATION:  CT SHOULDER WITHOUT CONTRAST LEFT    CLINICAL HISTORY:  preop planning glenohumeral arthritis;  Primary osteoarthritis, left shoulder    TECHNIQUE:  CT of the left shoulder without contrast.  Coronal and sagittal reformats were provided.    COMPARISON:  Plain films from 08/17/2020    FINDINGS:  There is severe joint space narrowing and osteophyte formation seen on either side of the glenohumeral joint with subchondral cystic changes seen on either side of the glenohumeral joint.  There is also some lucency along with depression at the articular surface of the humeral head with the depressed fragment measuring approximately 18 mm in size.  The degree of depression measures on the order of approximately 2 mm.   This is either related to advanced degenerative change and or AVN.  This is best appreciated on the coronal images.  Mild AC joint arthropathy is noted.    Impression  As above      Electronically signed by: Ricky Bardales DO  Date:    01/05/2021  Time:    09:17      Physician read: I agree with the above impression.    Assessment/Plan:   Radha Ramachandran is a 57 y.o. female with left knee osteoarthritis and chronic right shoulder pain, suspected rotator cuff tear, long head biceps tendon rupture     Plan:    Discussed diagnosis and treatment options with the patient today. She has known left knee osteoarthritis with lateral compartment joint space loss and she also has chronic right shoulder pain with long head of biceps tendon rupture and suspected rotator cuff tear.   We previously discussed getting an MRI of the right shoulder to evaluate for suspected rotator cuff tear. However, she has been unable to complete this at this time. She would like to get the MRI rescheduled today. I also recommend proceeding with left knee intra-articular and right shoulder subacromial space corticosteroid injections today. The patient is in agreement with this plan.   Left knee intra-articular and right shoulder shoulder corticosteroid injection into the subacromial space performed today. Patient tolerated the procedure well with no immediate complications.   Follow up after MRI.           Ramesh Chau MD    I, Rob Fan, acted as a scribe for Ramesh Chau MD for the duration of this office visit.

## 2022-10-26 ENCOUNTER — PATIENT MESSAGE (OUTPATIENT)
Dept: ORTHOPEDICS | Facility: CLINIC | Age: 57
End: 2022-10-26
Payer: MEDICAID

## 2022-10-27 ENCOUNTER — TELEPHONE (OUTPATIENT)
Dept: ORTHOPEDICS | Facility: CLINIC | Age: 57
End: 2022-10-27
Payer: MEDICAID

## 2022-10-27 NOTE — TELEPHONE ENCOUNTER
Called patient and let her know that we would schedule her f/u visit with Dr. Chau to review her right [facility] MRI (11/1) results on 11/4 at 8:15am. Patient verbalized understanding and was grateful for the call back.

## 2022-11-01 ENCOUNTER — HOSPITAL ENCOUNTER (OUTPATIENT)
Dept: RADIOLOGY | Facility: HOSPITAL | Age: 57
Discharge: HOME | End: 2022-11-01
Attending: STUDENT IN AN ORGANIZED HEALTH CARE EDUCATION/TRAINING PROGRAM
Payer: MEDICAID

## 2022-11-01 DIAGNOSIS — M19.011 OSTEOARTHRITIS OF GLENOHUMERAL JOINT, RIGHT: ICD-10-CM

## 2022-11-01 DIAGNOSIS — M25.511 RIGHT SHOULDER PAIN, UNSPECIFIED CHRONICITY: ICD-10-CM

## 2022-11-01 DIAGNOSIS — M75.21 BICEPS TENDINITIS OF RIGHT UPPER EXTREMITY: ICD-10-CM

## 2022-11-01 PROCEDURE — 73221 MRI SHOULDER WITHOUT CONTRAST RIGHT: ICD-10-PCS | Mod: 26,RT,, | Performed by: RADIOLOGY

## 2022-11-01 PROCEDURE — 73221 MRI JOINT UPR EXTREM W/O DYE: CPT | Mod: TC,RT

## 2022-11-01 PROCEDURE — 73221 MRI JOINT UPR EXTREM W/O DYE: CPT | Mod: 26,RT,, | Performed by: RADIOLOGY

## 2022-11-03 NOTE — PROGRESS NOTES
Orthopaedic Follow-Up Visit    Last Appointment: 10/25/22  Diagnosis: Left knee osteoarthritis and chronic right shoulder pain, suspected rotator cuff tear, long head biceps tendon rupture   Prior Procedure: Right SAS CSI and MRI    Radha Ramachandran is a 57 y.o. female who is here for f/u evaluation of her right shoulder. The patient was last seen here by me on 10/25/22 at which point we decided to proceed with right shoulder subacromial corticosteroid injection and MRI prior to considering further treatment options. The patient returns today to review her MRI and discuss further treatment options.     To review her history, she has known left knee osteoarthritis and right shoulder long head biceps tendon rupture and suspected rotator cuff tear. We previously ordered MRI of the right shoulder but the patient has yet to complete this. She is ready to proceed getting the MRI scheduled. She is also ready to proceed with knee replacement surgery.     Patient's medications, allergies, past medical, surgical, social and family histories were reviewed and updated as appropriate.    Review of Systems   All systems reviewed were negative.  Specifically, the patient denies fever, chills, weight loss, chest pain, shortness of breath, or dyspnea on exertion.      Past Medical History:   Diagnosis Date    COPD with asthma     Fibromyalgia     H/O psoriatic arthritis     Hx of migraines     Post-traumatic osteoarthritis of left shoulder 12/15/2020    Rheumatoid arthritis        Past Surgical History:   Procedure Laterality Date    ANKLE FUSION Right     APPENDECTOMY      ARTHROPLASTY OF SHOULDER Left 1/25/2021    Procedure: ARTHROPLASTY, SHOULDER;  Surgeon: Ramesh Chau MD;  Location: [facility];  Service: Orthopedics;  Laterality: Left;    CHOLECYSTECTOMY      EYELID LACERATION REPAIR Left     HYSTERECTOMY      OPEN REDUCTION AND INTERNAL FIXATION (ORIF) OF INJURY OF HAND  right    ORIF TIBIA & FIBULA FRACTURES Right         Patient's Medications   New Prescriptions    No medications on file   Previous Medications    ALBUTEROL (PROVENTIL/VENTOLIN HFA) 90 MCG/ACTUATION INHALER    INHALE 1 PUFF EVERY 4 HOURS AS NEEDED    ALBUTEROL INHL    Inhale into the lungs.    ASPIRIN (ECOTRIN) 81 MG EC TABLET    Take 1 tablet (81 mg total) by mouth 2 (two) times a day. for 14 days    CEFDINIR (OMNICEF) 300 MG CAPSULE    Take 300 mg by mouth once daily.    ESCITALOPRAM OXALATE (LEXAPRO) 20 MG TABLET    Take 20 mg by mouth once daily.    FAMOTIDINE (PEPCID) 40 MG TABLET    Take 40 mg by mouth 2 (two) times daily.    FLUTICASONE PROPIONATE (FLONASE) 50 MCG/ACTUATION NASAL SPRAY    1 spray by Each Nostril route once daily.    IBUPROFEN (ADVIL,MOTRIN) 800 MG TABLET    Take 1 tablet (800 mg total) by mouth 3 (three) times daily.    LEVOCETIRIZINE (XYZAL) 5 MG TABLET    Take 5 mg by mouth every evening.    LIDOCAINE-PRILOCAINE (EMLA) CREAM        MELOXICAM (MOBIC) 7.5 MG TABLET    TAKE 1 TABLET BY MOUTH EVERY DAY    METHOCARBAMOL (ROBAXIN) 500 MG TAB    Take 2 tablets (1,000 mg total) by mouth 3 (three) times daily as needed (muscle spasms or pain).    NITROFURANTOIN, MACROCRYSTAL-MONOHYDRATE, (MACROBID) 100 MG CAPSULE    Take 100 mg by mouth 2 (two) times daily.    OXYCODONE (ROXICODONE) 5 MG IMMEDIATE RELEASE TABLET    Take 1 tablet (5 mg total) by mouth every 6 (six) hours as needed for Pain (post-op pain).    SPIRIVA WITH HANDIHALER 18 MCG INHALATION CAPSULE    SMARTSI Puff(s) Via Inhaler Daily    TRAMADOL (ULTRAM) 50 MG TABLET    Take 1 tablet (50 mg total) by mouth every 6 (six) hours as needed for Pain.    TRAMADOL (ULTRAM) 50 MG TABLET    Take 1 tablet (50 mg total) by mouth every 6 (six) hours as needed for Pain.   Modified Medications    No medications on file   Discontinued Medications    No medications on file       No family history on file.    Review of patient's allergies indicates:   Allergen Reactions    Latex, natural rubber Hives  "    Pt "forgot' to report allergy until rash noted on L forearm.           Objective:      Physical Exam  Patient is alert and oriented, no distress. Skin is intact. Neuro is normal with no focal motor or sensory findings.    Cervical exam is unremarkable. Intact cervical ROM. Negative Spurling's test    Physical Exam:                       RIGHT                                     LEFT     Scap Dyskinesis/Winging       (-)                                             (-)     Tenderness:                                                                              Greater Tuberosity                  +                                              (-)  Bicipital Groove                       +                                              (-)  AC joint                                   (-)                                             (-)  Other:      ROM:  Forward Elevation       180                                          180  Abduction                    120                                          120  ER (at side)                 80                                            80  IR                                 T8                                            T8     Strength:   Supraspinatus             4/5                                           5/5  Infraspinatus               4+/5                                         5/5  Subscap / IR               4/5                                           5/5      Special Tests:              Neer:                                       +                                            (-)              Miller:                                 (-)                                            (-)              SS Stress:                               +                                              (-)              Bear Hug:                                (-)                                             (-)              Saluda's:                                 +                         "                      (-)              Resisted Thrower's:                +                                              (-)              Cross Arm Abduction:             (-)                                             (-)     Clinical evidence of right long head biceps tendon rupture    Neurovascular examination  - Motor grossly intact bilaterally to shoulder abduction, elbow flexion and extension, wrist flexion and extension, and intrinsic hand musculature  - Sensation intact to light touch bilaterally in axillary, median, radial, and ulnar distributions  - Symmetrical radial pulses    Imaging:    XR Results:  Results for orders placed during the hospital encounter of 05/06/22    X-ray Shoulder 2 or More Views Right    Narrative  EXAMINATION:  XR SHOULDER COMPLETE 2 OR MORE VIEWS RIGHT    CLINICAL HISTORY:  Pain in right shoulder    TECHNIQUE:  Two or three views of the right shoulder were performed.    COMPARISON:  None    FINDINGS:  Mild glenohumeral joint degenerative findings present including osteophyte formation and inferior joint space loss.  AC joint intact.  No acute osseous abnormality.  Lung parenchyma clear.  Lower cervical spine degenerative findings.    Impression  As above      Electronically signed by: Price Rose MD  Date:    05/06/2022  Time:    08:37    MRI Results:  Results for orders placed during the hospital encounter of 11/01/22    MRI Shoulder Without Contrast Right    Narrative  EXAMINATION:  MRI SHOULDER WITHOUT CONTRAST RIGHT    CLINICAL HISTORY:  rule out tear;  Primary osteoarthritis, right shoulder    TECHNIQUE:  Multiplanar multislice images are were performed through the right shoulder.    COMPARISON:  None    FINDINGS:  Moderate to high-grade partial-thickness tearing of the supraspinatus tendon at the footprint.  High-grade subscapularis tendon with medially subluxed and partially torn long head of the biceps tendon.  Low-grade infraspinatus tendon tearing.  The teres minor  tendon is unremarkable.    Glenohumeral osteoarthritis with anterior superior and superior labral tearing.    Acromioclavicular osteoarthritis with subacromial enthesophyte.    No acute stress or traumatic fracture.    Impression  High-grade subscapularis tendon tear with medially subluxed and partially torn long head of the biceps tendon.    Moderate grade supraspinatus tendon tearing.    Glenohumeral osteoarthritis with labral tear.    Acromioclavicular osteoarthritis with subacromial enthesophyte.      Electronically signed by: Humphrey Stephens  Date:    11/01/2022  Time:    17:56    CT Results:  Results for orders placed during the hospital encounter of 01/05/21    CT Shoulder Without Contrast Left    Narrative  EXAMINATION:  CT SHOULDER WITHOUT CONTRAST LEFT    CLINICAL HISTORY:  preop planning glenohumeral arthritis;  Primary osteoarthritis, left shoulder    TECHNIQUE:  CT of the left shoulder without contrast.  Coronal and sagittal reformats were provided.    COMPARISON:  Plain films from 08/17/2020    FINDINGS:  There is severe joint space narrowing and osteophyte formation seen on either side of the glenohumeral joint with subchondral cystic changes seen on either side of the glenohumeral joint.  There is also some lucency along with depression at the articular surface of the humeral head with the depressed fragment measuring approximately 18 mm in size.  The degree of depression measures on the order of approximately 2 mm.  This is either related to advanced degenerative change and or AVN.  This is best appreciated on the coronal images.  Mild AC joint arthropathy is noted.    Impression  As above      Electronically signed by: Ricky Bardales DO  Date:    01/05/2021  Time:    09:17      Physician read: I agree with the above impression.    Assessment/Plan:   Radha Ramachandran is a 57 y.o. female with right shoulder rotator cuff tear including subscapularis with long head biceps tendon rupture, subacromial  impingement     Plan:    Discussed diagnosis and treatment options with the patient today as well as reviewed the MR. Her MRI shows right shoulder rotator cuff tear including high grade subscapularis tear glenohumeral arthritis, and AC joint arthritis with enthesophyte and subsequent subacromial impingement. She has clinical evidence of long head biceps tendon rupture as well.    Discussed non-operative treatment options in the form of rest, activity modifications, oral anti-inflammatories, corticosteroid injections, and physical therapy versus operative treatment in the form of arthroscopic rotator cuff repair.   The patient has tried considerable conservative treatment in the form of rest, activity modifications, oral anti-inflammatories, physical therapy, and corticosteroid injections. Despite these interventions, she continues to experience symptoms on a daily basis that limit her activities of daily living and diminish her quality of life.   I recommend proceeding with right shoulder arthroscopy with rotator cuff repair and subacromial decompression.    We reviewed the proposed procedure in detail, which included discussion of risks and benefits, techniques, and possible complications of the procedure. Risks include infection, bleeding, damage to artery and nerves, continual pain and possible stiffness, and blood clots. We reviewed the post-operative restrictions, recovery period, and rehabilitation.  All patient questions were answered. Despite the risks, she elected to proceed with surgery and the consent was freely signed.  Prescription for Mobic provided today. Informed her to stop taking this 1 week prior to surgery.   Follow up with me 10-14 days after surgery          Ramesh Chau MD    I, Rob Fan, acted as a scribe for Ramesh Chau MD for the duration of this office visit.

## 2022-11-04 ENCOUNTER — OFFICE VISIT (OUTPATIENT)
Dept: ORTHOPEDICS | Facility: CLINIC | Age: 57
End: 2022-11-04
Payer: MEDICAID

## 2022-11-04 VITALS — WEIGHT: 123 LBS | BODY MASS INDEX: 20.49 KG/M2 | HEIGHT: 65 IN

## 2022-11-04 DIAGNOSIS — M19.011 ARTHRITIS OF RIGHT ACROMIOCLAVICULAR JOINT: ICD-10-CM

## 2022-11-04 DIAGNOSIS — M17.12 PRIMARY OSTEOARTHRITIS OF LEFT KNEE: ICD-10-CM

## 2022-11-04 DIAGNOSIS — M75.21 BICEPS TENDINITIS OF RIGHT UPPER EXTREMITY: ICD-10-CM

## 2022-11-04 DIAGNOSIS — M75.41 SUBACROMIAL IMPINGEMENT OF RIGHT SHOULDER: ICD-10-CM

## 2022-11-04 DIAGNOSIS — M75.121 NONTRAUMATIC COMPLETE TEAR OF RIGHT ROTATOR CUFF: Primary | ICD-10-CM

## 2022-11-04 DIAGNOSIS — M19.011 OSTEOARTHRITIS OF GLENOHUMERAL JOINT, RIGHT: ICD-10-CM

## 2022-11-04 PROCEDURE — 99999 PR PBB SHADOW E&M-EST. PATIENT-LVL V: ICD-10-PCS | Mod: PBBFAC,,, | Performed by: STUDENT IN AN ORGANIZED HEALTH CARE EDUCATION/TRAINING PROGRAM

## 2022-11-04 PROCEDURE — 1159F MED LIST DOCD IN RCRD: CPT | Mod: CPTII,,, | Performed by: STUDENT IN AN ORGANIZED HEALTH CARE EDUCATION/TRAINING PROGRAM

## 2022-11-04 PROCEDURE — 99214 OFFICE O/P EST MOD 30 MIN: CPT | Mod: S$PBB,,, | Performed by: STUDENT IN AN ORGANIZED HEALTH CARE EDUCATION/TRAINING PROGRAM

## 2022-11-04 PROCEDURE — 99214 PR OFFICE/OUTPT VISIT, EST, LEVL IV, 30-39 MIN: ICD-10-PCS | Mod: S$PBB,,, | Performed by: STUDENT IN AN ORGANIZED HEALTH CARE EDUCATION/TRAINING PROGRAM

## 2022-11-04 PROCEDURE — 3008F BODY MASS INDEX DOCD: CPT | Mod: CPTII,,, | Performed by: STUDENT IN AN ORGANIZED HEALTH CARE EDUCATION/TRAINING PROGRAM

## 2022-11-04 PROCEDURE — 99999 PR PBB SHADOW E&M-EST. PATIENT-LVL V: CPT | Mod: PBBFAC,,, | Performed by: STUDENT IN AN ORGANIZED HEALTH CARE EDUCATION/TRAINING PROGRAM

## 2022-11-04 PROCEDURE — 1159F PR MEDICATION LIST DOCUMENTED IN MEDICAL RECORD: ICD-10-PCS | Mod: CPTII,,, | Performed by: STUDENT IN AN ORGANIZED HEALTH CARE EDUCATION/TRAINING PROGRAM

## 2022-11-04 PROCEDURE — 1160F PR REVIEW ALL MEDS BY PRESCRIBER/CLIN PHARMACIST DOCUMENTED: ICD-10-PCS | Mod: CPTII,,, | Performed by: STUDENT IN AN ORGANIZED HEALTH CARE EDUCATION/TRAINING PROGRAM

## 2022-11-04 PROCEDURE — 99215 OFFICE O/P EST HI 40 MIN: CPT | Mod: PBBFAC | Performed by: STUDENT IN AN ORGANIZED HEALTH CARE EDUCATION/TRAINING PROGRAM

## 2022-11-04 PROCEDURE — 3008F PR BODY MASS INDEX (BMI) DOCUMENTED: ICD-10-PCS | Mod: CPTII,,, | Performed by: STUDENT IN AN ORGANIZED HEALTH CARE EDUCATION/TRAINING PROGRAM

## 2022-11-04 PROCEDURE — 1160F RVW MEDS BY RX/DR IN RCRD: CPT | Mod: CPTII,,, | Performed by: STUDENT IN AN ORGANIZED HEALTH CARE EDUCATION/TRAINING PROGRAM

## 2022-11-04 NOTE — PATIENT INSTRUCTIONS
In preparation for you upcoming surgery, here are some things to keep in mind leading up to and after your surgery:    PRE-ADMIT APPOINTMENT  We have a department that will review your chart for any health conditions or other issues to make sure that it is safe from an anesthesia standpoint to undergo surgery.   If they have any concerns they may schedule an appointment for you to be evaluated and have any further testing done. This may include but is not limited to bloodwork, EKG, chest X-ray, referral to cardiologist for additional testing/clearance, referral to pulmonologist for additional testing/clearance, or referral to any other needed specialties for additional testing/clearance.  If only basic testing is needed this appointment may be scheduled a few days before your actually surgery. Unless any new concerns or issues arise, this typically does not affect the date of your surgery.   If you are on any medications, at this appointment they will also review and discuss/provide instructions on when to stop or start taking these medications before and after surgery.   INSTRUCTIONS FOR SURGERY   The day before your surgery (usually between 1-3 PM), someone will call you to give you the scheduled time for you surgery, what time you need to arrive, what time to not eat/drink past, and any other final instructions  If your surgery is scheduled for Monday then they will call you on Friday afternoon.   If you do not receive this call please reach out to our office before the end of the day (4 PM) so that we may assist you.   PHYSICAL THERAPY  A referral for physical therapy will be placed to the location we discussed today. If you would like to make changes to this, please give us a call or send us a [employer] message as soon as possible so we may coordinate these changes.   We will send that referral to the desired location and also provide them with the rehabilitation protocol that will be followed to make sure you  are progressed appropriately after surgery.   They will also be provided with the start date of your PT after surgery. You will likely start PT prior to you first post-op appointment. Depending on the procedure you have performed this may be as soon as 3 days after surgery or up to 2 weeks after surgery. Below are a few examples of some common time frames for certain procedures:  Rotator cuff surgery- 10-11 days after surgery  Shoulder labrum surgery- 3-5 days after surgery   Shoulder replacement- 3-5 days after surgery  ACL Reconstruction- 3-5 days after surgery  Hip scope- 3-5 days after surgery  Distal biceps tendon repair- once post-op splint is removed/per Dr. Chau recommendation  Fracture- once post-op splint is removed/per Dr. Chau recommendation   If you ever have any problems or issues with your physical therapy or wish to change locations at any point, please let us know and we are happy to assist with that change.   POST-OP APPOINTMENTS  Post-op appointments will be scheduled at 2 weeks, 6 weeks, and 3 months from the date of your surgery. We will schedule these appointments prior to your surgery and they will be able to be viewed in [facility].  2 week post-op appointment  This appointment will be with Roselia Monsalve who is Dr. Chau's physician assistant (PA). At this appointment we will be removing your sutures, checking for any signs of infection or other concerning issues, and checking to make sure your range of motion is appropriate.   Dr. Chau will also be in clinic on the same day as this appointment and can step in to see you if there is anything of concern that needs to be addressed.   You may also have X-rays scheduled at this appointment, depending on the procedure you had performed (shoulder replacement, ACL surgery, surgery for a fracture, etc.)  6 week post-op appointment  This appointment will be with Dr. Chau. He will make sure everything is progressing well and may  also review your pictures from surgery if any were taken.   You may also have X-rays at this appointment, depending on the procedure you had performed (shoulder replacement, surgery for a fracture, etc.)  3 month post-op appointment  This appointment will be with Dr. Chau. He will make sure you continue to progress appropriately.  Any follow-ups after this visit will be at the discretion of Dr. Chau based upon your recovery/progress, procedure performed, etc.   FMLA OR SHORT TERM DISABILITY PAPERWORK  If you have any paperwork that needs to be filled out in regards to FMLA leave or short term disability leave, you may drop these forms off at the [facility] or attachment them to a patient message via [employer].   These forms will be filled out within a few days of your actual surgery being performed in case your surgery date is rescheduled or changed and the forms would need to potentially be filled out again.   Please try to provide these forms to our office in a timely manner, as we ask for 5-7 business days for them to be completed.       Surgical Treatment of Rotator Cuff Tears    Your doctor may recommend surgery if your pain does not improve with nonsurgical methods. Continued pain is the main indication for surgery. However, your doctor may also suggest surgery if you are very active and/or use your arms for overhead work or sports.     Other signs that surgery may be a good option for you include:  Your symptoms have lasted 6 to 12 months  You have a large tear (more than 3 cm) and the quality of the surrounding tissue is good  You have significant weakness and loss of function in your shoulder  Your tear was caused by a recent, acute injury    Surgery to repair a torn rotator cuff most often involves re-attaching the tendon to the head of humerus (upper arm bone).  Most surgical repairs can be done on an outpatient basis and do not require you to stay overnight in the hospital.     You may have other  shoulder problems in addition to a rotator cuff tear, such as:  Biceps tendon tears (biceps tenotomy versus tenodesis)  Osteoarthritis  Bone spurs (subacromial decompression)  Other soft tissue tears    During the operation, your surgeon may be able to take care of these problems, as well.     The three techniques most commonly used for rotator cuff repair are:  Open repair  Arthroscopic repair (most commonly used today)  Mini-open repair      ALL ARTHROSCOPIC REPAIR  During arthroscopy, your surgeon inserts a small camera, called an arthroscope, into your shoulder joint. The camera displays a live video feed on a monitor, and your surgeon uses these images to guide miniature surgical instruments. Because the arthroscope and surgical instruments are small and thin, your surgeon can use very small incisions (portals), rather than the larger incision needed for standard, open surgery. All-arthroscopic repair is usually an outpatient procedure and is the least invasive method to repair a torn rotator cuff.              PREPARING FOR SURGERY    Medical Evaluation  If you decide to have shoulder replacement surgery, your orthopaedic surgeon may ask you to schedule a complete physical examination with your family physician several weeks before surgery. This is needed to make sure you are healthy enough to have the surgery and complete the recovery process. Many patients with chronic medical conditions, like heart disease, must also be evaluated by a specialist, such as a cardiologist, before the surgery.    Medications  Be sure to talk to your orthopaedic surgeon about the medications you take. Some medications may need to be stopped before surgery. For example, the following over-the-counter medicines may cause excessive bleeding and should be stopped 2 weeks before surgery:  Non-steroidal anti-inflammatory drugs (NSAIDs), such as aspirin, ibuprofen, and naproxen  Most arthritis medications    If you take blood thinners,  either your primary care doctor or cardiologist will advise you about stopping these medications before surgery.    Home Planning  Making simple changes in your home before surgery can make your recovery period easier.For the first several weeks after your surgery, it will be hard to reach high shelves and cupboards. Before your surgery, be sure to go through your home and place any items you may need afterwards on low shelves. When you come home from the hospital, you will need help for a few weeks with some daily tasks like dressing, bathing, cooking, and laundry. If you will not have any support at home immediately after surgery, you may need a short stay in a rehabilitation facility until you become more independent.      YOUR SURGERY    Before Your Operation  Wear loose-fitting clothes and a button-front shirt when you go to the hospital for your surgery. After surgery, you will be wearing a sling and will have limited use of your arm.    Nerve Block  Will receive a nerve block for your surgery to help control your pain after surgery. This cab cause your arm (down to your hand) to feel numb for up to 3 days after surgery. However, it may wear off sooner.      Surgical Procedure  The surgery usually takes about 1-1.5 hours depending on the extent of your tear and any other procedures that need to be performed. After surgery, you will be moved to the recovery room, where you will remain  while your recovery from anesthesia is monitored. Barring any complications you will go home the day of your surgery.   A video of what arthroscopic rotator cuff repair looks like can be found at the following link: Athroscopic Rotator Cuff Repair        AFTER YOUR SURGERY    Immobilization  When you leave the hospital, your arm will be in a sling. You will need the sling to support and protect your shoulder for the first 2 to 6 weeks after surgery, depending on the complexity of your surgery and your surgeon's  preference.    Dressing and Wound Care  Keep the dressing clean and dry. It is normal for there to be some drainage after surgery since the shoulder was irrigated with large amounts of fluid. Reinforce with additional gauze as necessary.    Remove the dressing the 2nd day after surgery and begin changing daily with clean gauze or Band-Aids®. Keep your incisions covered until you follow up in clinic.  If you have Steri-Strips in place of stitches, allow them to stay in place as long as possible. Steri-Strips are made of a fabric material that can get wet in the shower and pat dry with a towel. They usually fall off on their own within 7 to 10 days. You may trim the edges as they begin to curl.   You may bathe or shower on the 2nd day after surgery, but do not scrub or soak the incisions. Dry the area by gently blotting it with a gauze or towel. After it is completely dry, cover the wound with clean gauze or Band-Aids®. Do NOT submerge the incisions (bath/swim) until after the sutures are removed and the wound has completely healed.     Post-Op Medications    Pain Control  After surgery, you will feel pain. However, it is important to stay ahead of pain as it becomes challenging to get under control if you fall behind. Ice and elevation can help and should be used as much as possible in the first few days.   Narcotic pain medications, such as tramadol and oxycodone, should be taken as prescribed. The Tramadol is intended to be taken first as the primary medicine and then oxycodone taken for breakthrough pain. Wean off as soon as possible. Take these with food to decrease the chances of nausea and vomiting. Do not drink alcohol, drive a vehicle, or use heavy machinery while taking narcotic pain medications.   NSAID medications are used for pain control and to decrease inflammation. You may be prescribed an NSAID such as celebrex. Take as instructed. Other NSAID medications such as ibuprofen, Motrin, Advil, naproxen,  or Aleve can be used once you have finished the celebrex, or if a prescription for celebrex was not provided.   Acetaminophen (Tylenol) is an effective over-the-counter pain medication that can be used with NSAID medications and non-acetaminophen containing narcotics such as plain oxycodone.     Blood Clot Prevention  You should take one 81 mg baby aspirin twice daily for two weeks starting the evening of the day you have surgery unless instructed otherwise or taking a different blood thinner such as enoxaparin or warfarin. If you are aware that you are at high risk for a blood clot, notify your physician as soon as possible.   Take aspirin at least 30 minutes before taking ibuprofen or Toradol.    Constipation Prevention  Anesthesia and pain medications, changes in eating and drinking, and less activity can all lead to constipation after surgery. To prevent or reduce constipation, take an over-the-counter stool softener (brands include Colace and Miralax). Follow the directions on the bottle. Drink plenty of water and eat high fiber foods including whole grains, fresh fruits, vegetables, beans, prunes or prune juice.      Physical Therapy  Exercise is a critical component of home care, particularly during the first few weeks after surgery and this will include physical therapy, which will play a vital role in getting you back to your daily activities by helping you regain shoulder strength and motion. Physical therapy will start 10-14 days after your surgery (it can start before your first post-op visit with your doctor). It will progress as follows  Passive exercise: Even though your tear has been repaired, the muscles around your arm remain weak. Once your surgeon decides it is safe for you to move your arm and shoulder, a therapist will help you with passive exercises to improve range of motion in your shoulder. With passive exercise, your therapist supports your arm and moves it in different positions. In most  cases, passive exercise is begun within the first 4 to 6 weeks after surgery.  Active exercise: After 6 weeks, you will progress to doing active exercises without the help of your therapist. Moving your muscles on your own will gradually increase your strength and improve your arm control. At 8 to 12 weeks, your therapist will start you on a strengthening exercise program.  Examples of shoulder exercises can be found at the following link: Rotator Cuff and Shoulder Rehabilitation Exercises    Driving  Your physician will provide recommendations on when it is safe to drive after surgery. At minimum you must NOT be taking any narcotic/opoid medications and feel you are capable of driving. It is NOT recommended that you drive while wearing a sling.       Outcome  The majority of patients report improved shoulder strength and less pain after surgery for a torn rotator cuff. Expect a complete recovery to take several months. Most patients have a functional range of motion and adequate strength by 4 to 6 months after surgery but it may take up to 1 full year from your surgery to be completely healed.  Although it is a slow process, your commitment to rehabilitation is key to a successful outcome.    Factors that can decrease the likelihood of a satisfactory result include:  Poor tendon/tissue quality  Large or massive tears  Poor patient compliance with/participation in restrictions and rehabilitation after surgery  Patient age (older than 65 years)  Smoking and use of other nicotine products  Workers' compensation claims    Complications  After rotator cuff surgery, a small percentage of patients experience complications. In addition to the risks of surgery in general, such as blood loss or problems related to anesthesia, complications of rotator cuff surgery may include:  Nerve injury: This typically involves the nerve that activates your shoulder muscle (deltoid) but this is not common in shoulder arthroscopy.    Infection: Patients are given antibiotics during the procedure to lessen the risk for infection. If an infection develops, additional surgery and/or prolonged antibiotic treatment may be needed.  Stiffness: Early rehabilitation lessens the likelihood of permanent stiffness or loss of motion. Most of the time, stiffness will improve with more aggressive therapy and exercise. A more advanced type of stiffness called adhesive capsulitis (frozen shoulder) can also develop secondary to an overactive inflammatory response when the shoulder is healing. This can be more common in individuals with diabetes or thyroid disorders. Yo  Tendon re-tear: There is a chance for re-tear following all types of repairs. The larger the tear, the higher the risk of re-tear. Patients who re-tear their tendons usually do not have greater pain or decreased shoulder function. Repeat surgery is needed only if there is severe pain or loss of function.      Links  [employer] Clinical Practice Guideline on the Management of Rotator Cuff Injuries  Rotator Cuff Tears  Management of Rotator Cuff Injuries  Rotator Cuff and Shoulder Rehabilitation Exercises  Rotator Cuff Tear Surgical Treament  Athroscopic Rotator Cuff Repair (Video link)

## 2022-11-08 DIAGNOSIS — M75.121 NONTRAUMATIC COMPLETE TEAR OF RIGHT ROTATOR CUFF: Primary | ICD-10-CM

## 2022-11-08 DIAGNOSIS — M75.41 SUBACROMIAL IMPINGEMENT OF RIGHT SHOULDER: ICD-10-CM

## 2022-11-28 ENCOUNTER — TELEPHONE (OUTPATIENT)
Dept: ORTHOPEDICS | Facility: CLINIC | Age: 57
End: 2022-11-28
Payer: MEDICAID

## 2022-11-28 NOTE — TELEPHONE ENCOUNTER
----- Message from Lily Anaya sent at 11/28/2022  4:39 PM CST -----  Pt would like a call back regarding rescheduling the appt she missed. Call back number is [phone]. Thx JM

## 2022-12-05 ENCOUNTER — OFFICE VISIT (OUTPATIENT)
Dept: ORTHOPEDICS | Facility: CLINIC | Age: 57
End: 2022-12-05
Payer: MEDICAID

## 2022-12-05 VITALS — HEIGHT: 65 IN | WEIGHT: 136.13 LBS | BODY MASS INDEX: 22.68 KG/M2

## 2022-12-05 DIAGNOSIS — M25.562 LEFT KNEE PAIN, UNSPECIFIED CHRONICITY: Primary | ICD-10-CM

## 2022-12-05 DIAGNOSIS — M17.12 PRIMARY OSTEOARTHRITIS OF LEFT KNEE: Primary | ICD-10-CM

## 2022-12-05 PROCEDURE — 99214 PR OFFICE/OUTPT VISIT, EST, LEVL IV, 30-39 MIN: ICD-10-PCS | Mod: S$PBB,,, | Performed by: PHYSICIAN ASSISTANT

## 2022-12-05 PROCEDURE — 1159F PR MEDICATION LIST DOCUMENTED IN MEDICAL RECORD: ICD-10-PCS | Mod: CPTII,,, | Performed by: PHYSICIAN ASSISTANT

## 2022-12-05 PROCEDURE — 1160F PR REVIEW ALL MEDS BY PRESCRIBER/CLIN PHARMACIST DOCUMENTED: ICD-10-PCS | Mod: CPTII,,, | Performed by: PHYSICIAN ASSISTANT

## 2022-12-05 PROCEDURE — 99215 OFFICE O/P EST HI 40 MIN: CPT | Mod: PBBFAC | Performed by: PHYSICIAN ASSISTANT

## 2022-12-05 PROCEDURE — 1160F RVW MEDS BY RX/DR IN RCRD: CPT | Mod: CPTII,,, | Performed by: PHYSICIAN ASSISTANT

## 2022-12-05 PROCEDURE — 99999 PR PBB SHADOW E&M-EST. PATIENT-LVL V: CPT | Mod: PBBFAC,,, | Performed by: PHYSICIAN ASSISTANT

## 2022-12-05 PROCEDURE — 3008F PR BODY MASS INDEX (BMI) DOCUMENTED: ICD-10-PCS | Mod: CPTII,,, | Performed by: PHYSICIAN ASSISTANT

## 2022-12-05 PROCEDURE — 99214 OFFICE O/P EST MOD 30 MIN: CPT | Mod: S$PBB,,, | Performed by: PHYSICIAN ASSISTANT

## 2022-12-05 PROCEDURE — 1159F MED LIST DOCD IN RCRD: CPT | Mod: CPTII,,, | Performed by: PHYSICIAN ASSISTANT

## 2022-12-05 PROCEDURE — 3008F BODY MASS INDEX DOCD: CPT | Mod: CPTII,,, | Performed by: PHYSICIAN ASSISTANT

## 2022-12-05 PROCEDURE — 99999 PR PBB SHADOW E&M-EST. PATIENT-LVL V: ICD-10-PCS | Mod: PBBFAC,,, | Performed by: PHYSICIAN ASSISTANT

## 2022-12-05 RX ORDER — METHOCARBAMOL 500 MG/1
500 TABLET, FILM COATED ORAL NIGHTLY PRN
Qty: 30 TABLET | Refills: 0 | Status: SHIPPED | OUTPATIENT
Start: 2022-12-05 | End: 2023-01-03

## 2022-12-05 RX ORDER — DICLOFENAC SODIUM 10 MG/G
2 GEL TOPICAL 4 TIMES DAILY
Qty: 2 G | Refills: 2 | Status: SHIPPED | OUTPATIENT
Start: 2022-12-05

## 2022-12-05 NOTE — PROGRESS NOTES
Patient ID: Radha Ramachandran is a 57 y.o. female.    Chief Complaint: Pain of the Left Knee      HPI: Radha Ramachandran  is a 57 y.o. female who c/o Pain of the Left Knee       Patient is a new patient who presents to me today with chief complaint of left knee pain.  Patient states this has been chronic in nature affecting activity of daily living and thus her quality of life.  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  The patient has a hard time walking long distances unassisted, going from a sitting to standing standing to seated position, unlevel terrain or stairs.  She additionally has an infant at home which he tries to remain quite active in the care of her grandson.  She notes intermittent buckling especially when trying to ambulate stairs.  Associated fully patient states her right ankle is fused roughly 2014 and she also has a upcoming right shoulder replacement with Dr. Kidd.  She uses a brace as needed as well as topical pain gel in the form of Biofreeze.  She is no devices to assist with ambulation such as a cane.  She is completed formal PT in the past with minimal at best relief.  She is trying Mobic over-the-counter which does help a little bit.  She attest to muscle spasms especially at night as well.  She was told by a previous provider that she was bone-on-bone and needed a left knee replacement  She is had 2 short acting steroid injections this year the last being 10/25/2022.  She states this did help some but is starting to wear off with her daily routine    Patient is presently denying any shortness of breath, chest pain, fever/chills, nausea/vomiting, loss of taste or smell, numbness/tingling or sensation changes, loss of bladder or bowel function.    Past Medical History:   Diagnosis Date    COPD with asthma     Fibromyalgia     H/O psoriatic arthritis     Hx of migraines     Post-traumatic osteoarthritis of left shoulder  12/15/2020    Rheumatoid arthritis        Past Surgical History:   Procedure Laterality Date    ANKLE FUSION Right     APPENDECTOMY      ARTHROPLASTY OF SHOULDER Left 1/25/2021    Procedure: ARTHROPLASTY, SHOULDER;  Surgeon: Ramesh Chau MD;  Location: [facility];  Service: Orthopedics;  Laterality: Left;    CHOLECYSTECTOMY      EYELID LACERATION REPAIR Left     HYSTERECTOMY      OPEN REDUCTION AND INTERNAL FIXATION (ORIF) OF INJURY OF HAND  right    ORIF TIBIA & FIBULA FRACTURES Right        History reviewed. No pertinent family history.    Social History     Socioeconomic History    Marital status: Single   Tobacco Use    Smoking status: Every Day     Packs/day: 0.50     Types: Cigarettes    Smokeless tobacco: Never   Substance and Sexual Activity    Alcohol use: Not Currently    Drug use: Never    Sexual activity: Not Currently     Partners: Male   Social History Narrative    Live w/ family         Medication List with Changes/Refills   New Medications    DICLOFENAC SODIUM (VOLTAREN) 1 % GEL    Apply 2 g topically 4 (four) times daily.   Current Medications    ALBUTEROL (PROVENTIL/VENTOLIN HFA) 90 MCG/ACTUATION INHALER    INHALE 1 PUFF EVERY 4 HOURS AS NEEDED    ALBUTEROL INHL    Inhale into the lungs.    ASPIRIN (ECOTRIN) 81 MG EC TABLET    Take 1 tablet (81 mg total) by mouth 2 (two) times a day. for 14 days    CEFDINIR (OMNICEF) 300 MG CAPSULE    Take 300 mg by mouth once daily.    ESCITALOPRAM OXALATE (LEXAPRO) 20 MG TABLET    Take 20 mg by mouth once daily.    FAMOTIDINE (PEPCID) 40 MG TABLET    Take 40 mg by mouth 2 (two) times daily.    FLUTICASONE PROPIONATE (FLONASE) 50 MCG/ACTUATION NASAL SPRAY    1 spray by Each Nostril route once daily.    LEVOCETIRIZINE (XYZAL) 5 MG TABLET    Take 5 mg by mouth every evening.    LIDOCAINE-PRILOCAINE (EMLA) CREAM        MELOXICAM (MOBIC) 7.5 MG TABLET    TAKE 1 TABLET BY MOUTH EVERY DAY    NITROFURANTOIN, MACROCRYSTAL-MONOHYDRATE, (MACROBID) 100 MG CAPSULE     "Take 100 mg by mouth 2 (two) times daily.    OXYCODONE (ROXICODONE) 5 MG IMMEDIATE RELEASE TABLET    Take 1 tablet (5 mg total) by mouth every 6 (six) hours as needed for Pain (post-op pain).    SPIRIVA WITH HANDIHALER 18 MCG INHALATION CAPSULE    SMARTSI Puff(s) Via Inhaler Daily    TRAMADOL (ULTRAM) 50 MG TABLET    Take 1 tablet (50 mg total) by mouth every 6 (six) hours as needed for Pain.    TRAMADOL (ULTRAM) 50 MG TABLET    Take 1 tablet (50 mg total) by mouth every 6 (six) hours as needed for Pain.   Changed and/or Refilled Medications    Modified Medication Previous Medication    METHOCARBAMOL (ROBAXIN) 500 MG TAB methocarbamoL (ROBAXIN) 500 MG Tab       Take 1 tablet (500 mg total) by mouth nightly as needed (muscle spasms or pain).    Take 2 tablets (1,000 mg total) by mouth 3 (three) times daily as needed (muscle spasms or pain).       Review of patient's allergies indicates:   Allergen Reactions    Latex, natural rubber Hives     Pt "forgot' to report allergy until rash noted on L forearm.           Objective:     Right Lower Extremity      KNEE:  ROM: passive flex/ ext full  Patella midling, crepitus noted   Ligaments stable  Pain on palpation to medial and lateral as well as patella  Calf NT, soft  (-) Qamar sign  DF/PF full  Wiggles toes  Sensation intact to light touch   No pitting edema appreciated   NVI  Cap refill < 2 sec    Skin warm to touch, no obvious lesion noted       IMAGING:    XRAY:  FINDINGS:  No acute fractures or dislocations visualized.  No definite joint effusion demonstrated on the right.  Small tricompartmental marginal osteophytes are present on the right with mild joint space narrowing in the medial compartment.  Multiple small rounded calcific densities project posterior to the knee near midline which are suspicious for intra-articular bodies.  There is tricompartmental osteoarthritis involving the left knee with moderate to severe joint space loss in the lateral " compartment.Findings appear similar to prior.     Impression:     Degenerative findings as above    Kellgren Delmer scale : 4     EMG:  No record on file    Assessment:       Encounter Diagnosis   Name Primary?    Primary osteoarthritis of left knee Yes          Plan:       Radha was seen today for pain.    Diagnoses and all orders for this visit:    Primary osteoarthritis of left knee  -     Ambulatory referral/consult to Orthopedics  -     Prior authorization Order  -     diclofenac sodium (VOLTAREN) 1 % Gel; Apply 2 g topically 4 (four) times daily.  -     methocarbamoL (ROBAXIN) 500 MG Tab; Take 1 tablet (500 mg total) by mouth nightly as needed (muscle spasms or pain).        Radha Ramachandran is a new patient who presents to me today with chief complaint of left knee pain. We had a long discussion today regarding degenerative arthritis in the knees. The patient understands that arthritis is chronic and will worsen over time.  The patient also understands that arthritis may cause episodic flare-ups in pain. Management or if arthritis is achieved through a multi-modal approach including weight loss in obese individuals, activity modification, NSAIDs (topical vs oral) where appropriate, periodic intra-articular steroid injections, viscosupplementation, physical therapy, knee bracing, ambulatory aids, as well as geniculate nerve blocks.  I would like to get the patient approved received Visco gel supplementation as I do believe she is a great candidate for this prior to discussing total knee replacement.  I would like to see the patient back once authorization has been approved.  Additionally I have restarted her Robaxin secondary to muscle spasms as well as added Voltaren gel.  She may use this topical pain gel 3 times a day as needed.  She will continue her current medication regimen including Mobic as directed.    Dr. Jiang is aware of the patient & current presentation. He agrees with the current plan  above.   Patient verbalized understanding of all instructions and agreed with the above plan.    No follow-ups on file.    The patient understands, chooses and consents to this plan and accepts all   the risks which include but are not limited to the risks mentioned above.     Disclaimer: This note was prepared using a voice recognition system and is likely to have sound alike errors within the text.

## 2023-01-09 ENCOUNTER — PATIENT MESSAGE (OUTPATIENT)
Dept: ORTHOPEDICS | Facility: CLINIC | Age: 58
End: 2023-01-09
Payer: MEDICAID

## 2023-01-12 ENCOUNTER — TELEPHONE (OUTPATIENT)
Dept: PULMONOLOGY | Facility: CLINIC | Age: 58
End: 2023-01-12
Payer: MEDICAID

## 2023-01-12 ENCOUNTER — HOSPITAL ENCOUNTER (OUTPATIENT)
Dept: PREADMISSION TESTING | Facility: HOSPITAL | Age: 58
Discharge: HOME | End: 2023-01-12
Attending: STUDENT IN AN ORGANIZED HEALTH CARE EDUCATION/TRAINING PROGRAM
Payer: MEDICAID

## 2023-01-12 ENCOUNTER — HOSPITAL ENCOUNTER (OUTPATIENT)
Dept: RADIOLOGY | Facility: HOSPITAL | Age: 58
Discharge: HOME | End: 2023-01-12
Attending: PHYSICIAN ASSISTANT
Payer: MEDICAID

## 2023-01-12 VITALS
HEART RATE: 70 BPM | RESPIRATION RATE: 14 BRPM | SYSTOLIC BLOOD PRESSURE: 130 MMHG | OXYGEN SATURATION: 97 % | DIASTOLIC BLOOD PRESSURE: 77 MMHG | TEMPERATURE: 98 F

## 2023-01-12 DIAGNOSIS — J44.89 COPD WITH ASTHMA: Primary | ICD-10-CM

## 2023-01-12 DIAGNOSIS — M19.112 POST-TRAUMATIC OSTEOARTHRITIS OF LEFT SHOULDER: Chronic | ICD-10-CM

## 2023-01-12 DIAGNOSIS — R91.8 PULMONARY NODULES/LESIONS, MULTIPLE: ICD-10-CM

## 2023-01-12 DIAGNOSIS — J44.9 CHRONIC OBSTRUCTIVE PULMONARY DISEASE, UNSPECIFIED COPD TYPE: ICD-10-CM

## 2023-01-12 DIAGNOSIS — L40.50 PSORIATIC ARTHRITIS: ICD-10-CM

## 2023-01-12 DIAGNOSIS — Z72.0 TOBACCO ABUSE: ICD-10-CM

## 2023-01-12 DIAGNOSIS — M25.562 LEFT KNEE PAIN, UNSPECIFIED CHRONICITY: ICD-10-CM

## 2023-01-12 DIAGNOSIS — Z01.818 PRE-OP TESTING: Primary | ICD-10-CM

## 2023-01-12 LAB
ALBUMIN SERPL BCP-MCNC: 4.1 G/DL (ref 3.5–5.2)
ALP SERPL-CCNC: 61 U/L (ref 55–135)
ALT SERPL W/O P-5'-P-CCNC: 18 U/L (ref 10–44)
ANION GAP SERPL CALC-SCNC: 12 MMOL/L (ref 8–16)
AST SERPL-CCNC: 16 U/L (ref 10–40)
BASOPHILS # BLD AUTO: 0.06 K/UL (ref 0–0.2)
BASOPHILS NFR BLD: 0.7 % (ref 0–1.9)
BILIRUB SERPL-MCNC: 0.5 MG/DL (ref 0.1–1)
BUN SERPL-MCNC: 14 MG/DL (ref 6–20)
CALCIUM SERPL-MCNC: 9.6 MG/DL (ref 8.7–10.5)
CHLORIDE SERPL-SCNC: 106 MMOL/L (ref 95–110)
CO2 SERPL-SCNC: 24 MMOL/L (ref 23–29)
CREAT SERPL-MCNC: 0.8 MG/DL (ref 0.5–1.4)
DIFFERENTIAL METHOD: ABNORMAL
EOSINOPHIL # BLD AUTO: 0.1 K/UL (ref 0–0.5)
EOSINOPHIL NFR BLD: 1.2 % (ref 0–8)
ERYTHROCYTE [DISTWIDTH] IN BLOOD BY AUTOMATED COUNT: 12.5 % (ref 11.5–14.5)
EST. GFR  (NO RACE VARIABLE): >60 ML/MIN/1.73 M^2
GLUCOSE SERPL-MCNC: 82 MG/DL (ref 70–110)
HCT VFR BLD AUTO: 43.7 % (ref 37–48.5)
HGB BLD-MCNC: 14.8 G/DL (ref 12–16)
IMM GRANULOCYTES # BLD AUTO: 0.03 K/UL (ref 0–0.04)
IMM GRANULOCYTES NFR BLD AUTO: 0.3 % (ref 0–0.5)
LYMPHOCYTES # BLD AUTO: 2.9 K/UL (ref 1–4.8)
LYMPHOCYTES NFR BLD: 32.8 % (ref 18–48)
MCH RBC QN AUTO: 32.5 PG (ref 27–31)
MCHC RBC AUTO-ENTMCNC: 33.9 G/DL (ref 32–36)
MCV RBC AUTO: 96 FL (ref 82–98)
MONOCYTES # BLD AUTO: 1 K/UL (ref 0.3–1)
MONOCYTES NFR BLD: 11.3 % (ref 4–15)
NEUTROPHILS # BLD AUTO: 4.8 K/UL (ref 1.8–7.7)
NEUTROPHILS NFR BLD: 53.7 % (ref 38–73)
NRBC BLD-RTO: 0 /100 WBC
PLATELET # BLD AUTO: 310 K/UL (ref 150–450)
PMV BLD AUTO: 9.1 FL (ref 9.2–12.9)
POTASSIUM SERPL-SCNC: 3.7 MMOL/L (ref 3.5–5.1)
PROT SERPL-MCNC: 7 G/DL (ref 6–8.4)
RBC # BLD AUTO: 4.56 M/UL (ref 4–5.4)
SODIUM SERPL-SCNC: 142 MMOL/L (ref 136–145)
WBC # BLD AUTO: 8.86 K/UL (ref 3.9–12.7)

## 2023-01-12 PROCEDURE — 93010 ELECTROCARDIOGRAM REPORT: CPT | Mod: ,,, | Performed by: INTERNAL MEDICINE

## 2023-01-12 PROCEDURE — 85025 COMPLETE CBC W/AUTO DIFF WBC: CPT | Performed by: PHYSICIAN ASSISTANT

## 2023-01-12 PROCEDURE — 73564 X-RAY EXAM KNEE 4 OR MORE: CPT | Mod: TC,LT

## 2023-01-12 PROCEDURE — 93010 EKG 12-LEAD: ICD-10-PCS | Mod: ,,, | Performed by: INTERNAL MEDICINE

## 2023-01-12 PROCEDURE — 73562 X-RAY EXAM OF KNEE 3: CPT | Mod: 26,RT,, | Performed by: RADIOLOGY

## 2023-01-12 PROCEDURE — 73562 XR KNEE ORTHO LEFT WITH FLEXION: ICD-10-PCS | Mod: 26,RT,, | Performed by: RADIOLOGY

## 2023-01-12 PROCEDURE — 73564 X-RAY EXAM KNEE 4 OR MORE: CPT | Mod: 26,LT,, | Performed by: RADIOLOGY

## 2023-01-12 PROCEDURE — 73564 XR KNEE ORTHO LEFT WITH FLEXION: ICD-10-PCS | Mod: 26,LT,, | Performed by: RADIOLOGY

## 2023-01-12 PROCEDURE — 93005 ELECTROCARDIOGRAM TRACING: CPT

## 2023-01-12 PROCEDURE — 80053 COMPREHEN METABOLIC PANEL: CPT | Performed by: PHYSICIAN ASSISTANT

## 2023-01-12 NOTE — H&P
Preoperative History and Physical  [facility], [place]                                                                   Chief Complaint: Preoperative evaluation     History of Present Illness:      Radha Ramachandran is a 57 y.o. female with PMH of asthma vs COPD, migraines, pulmonary nodules ( stable per 4/2022 imaging) , chronic knee and back pain who presents to the office today for a preoperative consultation at the request of Dr. Chau  who plans on performing  R rotator cuff repair  on February 9.     Functional Status:      The patient is able to climb a flight of stairs- slowly . The patient is able to ambulate  without difficulty- no cane or assistive devices . The patient's functional status is not affected by the surgical problem. The patient's functional status is not affected chest pain ; is affected by SOB . SOB with parking lot to clinic. SOB with heavy lifting at work   MET score greater than 4    Past Medical History:      Past Medical History:   Diagnosis Date    COPD with asthma     Digestive disorder     Fibromyalgia     H/O psoriatic arthritis     Hx of migraines     Post-traumatic osteoarthritis of left shoulder 12/15/2020    Rheumatoid arthritis         Past Surgical History:      Past Surgical History:   Procedure Laterality Date    ANKLE FUSION Right     APPENDECTOMY      ARTHROPLASTY OF SHOULDER Left 1/25/2021    Procedure: ARTHROPLASTY, SHOULDER;  Surgeon: Ramesh Chau MD;  Location: [facility];  Service: Orthopedics;  Laterality: Left;    CHOLECYSTECTOMY      EYELID LACERATION REPAIR Left     HYSTERECTOMY      OPEN REDUCTION AND INTERNAL FIXATION (ORIF) OF INJURY OF HAND  right    ORIF TIBIA & FIBULA FRACTURES Right         Social History:      Social History     Socioeconomic History    Marital status: Single   Tobacco Use    Smoking status: Every Day     Packs/day: 0.50     Types: Cigarettes    Smokeless tobacco: Never    Tobacco  "comments:     Started smoking at 15 y/o was up to 2 ppd in  ; started cutting renzo to 1 ppd now down to < 1 ppd    Substance and Sexual Activity    Alcohol use: Not Currently    Drug use: Never    Sexual activity: Not Currently     Partners: Male   Social History Narrative    Live w/ family          Family History:      Family History   Problem Relation Age of Onset    Heart failure Mother     Stroke Mother     Diabetes Mother     Pacemaker/defibrilator Mother     Coronary artery disease Sister     Hypertension Sister     Schizophrenia Sister     Stroke Maternal Grandmother     Asthma Maternal Grandmother     Gout Maternal Grandfather        Allergies:      Review of patient's allergies indicates:   Allergen Reactions    Latex, natural rubber Hives     Pt "forgot' to report allergy until rash noted on L forearm.         Medications:      Current Outpatient Medications   Medication Sig    albuterol (PROVENTIL/VENTOLIN HFA) 90 mcg/actuation inhaler INHALE 1 PUFF EVERY 4 HOURS AS NEEDED    EScitalopram oxalate (LEXAPRO) 20 MG tablet Take 20 mg by mouth once daily.    levocetirizine (XYZAL) 5 MG tablet Take 5 mg by mouth every evening.    meloxicam (MOBIC) 7.5 MG tablet TAKE 1 TABLET BY MOUTH EVERY DAY    methocarbamoL (ROBAXIN) 500 MG Tab TAKE 1 TABLET (500 MG TOTAL) BY MOUTH NIGHTLY AS NEEDED (MUSCLE SPASMS OR PAIN).    SPIRIVA WITH HANDIHALER 18 mcg inhalation capsule SMARTSI Puff(s) Via Inhaler Daily    ALBUTEROL INHL Inhale into the lungs.    aspirin (ECOTRIN) 81 MG EC tablet Take 1 tablet (81 mg total) by mouth 2 (two) times a day. for 14 days (Patient not taking: Reported on 2022)    cefdinir (OMNICEF) 300 MG capsule Take 300 mg by mouth once daily.    diclofenac sodium (VOLTAREN) 1 % Gel Apply 2 g topically 4 (four) times daily.    famotidine (PEPCID) 40 MG tablet Take 40 mg by mouth 2 (two) times daily.    fluticasone propionate (FLONASE) 50 mcg/actuation nasal spray 1 spray by Each Nostril route once " daily.    lidocaine-prilocaine (EMLA) cream     nitrofurantoin, macrocrystal-monohydrate, (MACROBID) 100 MG capsule Take 100 mg by mouth 2 (two) times daily.    oxyCODONE (ROXICODONE) 5 MG immediate release tablet Take 1 tablet (5 mg total) by mouth every 6 (six) hours as needed for Pain (post-op pain). (Patient not taking: Reported on 1/12/2023)    traMADoL (ULTRAM) 50 mg tablet Take 1 tablet (50 mg total) by mouth every 6 (six) hours as needed for Pain. (Patient not taking: Reported on 1/12/2023)    traMADoL (ULTRAM) 50 mg tablet Take 1 tablet (50 mg total) by mouth every 6 (six) hours as needed for Pain. (Patient not taking: Reported on 1/12/2023)     Current Facility-Administered Medications   Medication    methacholine chloride 0 mg/3 mL (0 mg/mL) nebulizer solution - white vial 3 mL    methacholine chloride 0.1875 mg/3 mL (0.0625 mg/mL) nebulizer solution - red vial 0.1875 mg    methacholine chloride 0.75 mg/3 mL (0.25 mg/mL) nebulizer solution - orange vial 0.75 mg    methacholine chloride 12 mg/3 mL (4 mg/mL) nebulizer solution - green vial 12 mg    methacholine chloride 3 mg/3 mL (1 mg/mL) nebulizer solution - yellow vial 3 mg    methacholine chloride 48 mg/3 mL (16 mg/mL) nebulizer solution - blue vial 48 mg     Facility-Administered Medications Ordered in Other Encounters   Medication    lactated ringers infusion       Vitals:      Vitals:    01/12/23 0840   BP: 130/77   Pulse: 70   Resp: 14   Temp: 98.1 °F (36.7 °C)       Review of Systems:        Constitutional: Negative for fever, chills, weight loss,  and diaphoresis. + fatigue   HENT: Negative for hearing loss, ear pain, nosebleeds, , sore throat, neck pain, tinnitus and ear discharge.  Nasal congestion ; decreased hearing L ear   Eyes: Negative for , double vision, photophobia, pain, discharge and redness. Blurred vision without glasses   Respiratory: Negative for , hemoptysis, sputum production, shortness of breath, and stridor.  Occasional  wheezing; ? Snoring ; + cough   Cardiovascular: Negative for chest pain, , orthopnea, claudication, leg swelling and PND. Palpitations with anxiety   Gastrointestinal: Negative for , nausea, vomiting, abdominal pain, diarrhea, constipation, blood in stool and melena. + GERD   Genitourinary: Negative for dysuria, urgency, frequency, hematuria and flank pain.   Musculoskeletal: Negative for myalgias, back pain, joint pain and falls. R hip pain radiating to R knee ; L knee pain R sciatic nerve pain constant throbbing R pain posterior leg down to feet since mid December   Skin: Negative for itching and rash.   Neurological: Negative for dizziness, , tremors, sensory change, speech change, focal weakness, seizures, loss of consciousness, weakness . Tingling hands and feet ; + migraines   Endo/Heme/Allergies: Negative for environmental allergies and polydipsia. pt reports easy to bruise  Psychiatric/Behavioral: positive for anxiety     Physical Exam:      Constitutional: Appears well-developed, well-nourished and in no acute distress.  Patient is oriented to person, place, and time.   Head: Normocephalic and atraumatic. Mucous membranes moist.  Neck: Neck supple no mass.   Cardiovascular: Normal rate and regular rhythm.  S1 S2 appreciated by ascultation.no wheezes noted, no rhonchi   Pulmonary/Chest: Effort normal and clear to auscultation bilaterally. No respiratory distress.   Abdomen: Soft. Non-tender and non-distended. Bowel sounds are normal.   Neurological: Patient is alert and oriented to person, place and time. Moves all extremities.  Skin: Warm and dry. No lesions.  Extremities: No clubbing, cyanosis. Trace LE edema to prior surgery site on R ankle no calf pain, no LE erythema     Laboratory data:      Reviewed and noted in plan where applicable. Please see chart for full laboratory data.        Predictors of intubation difficulty:       Morbid obesity? no   Anatomically abnormal facies? no   Prominent incisors?  no   Receding mandible? no   Short, thick neck? no   Neck range of motion: abnormal decreased L to R    Dentition:  upper denture loowe lower central left incisor   Based on the Modified Mallampati, patient is a mallampati score: I (soft palate, uvula, fauces, and tonsillar pillars visible)    Cardiographics:      EC22  SB 59   Echocardiogram:  none    Imaging:      Chest x-ray:  will defer to pulmonary       Assessment and Plan:      Post-traumatic osteoarthritis of left shoulder  - pt presents at the request of Dr. Chau who plans on performing a rotator cff repair and long head bicep tendon repair on 23  - Known risk factors for perioperative complications: Chronic pulmonary disease    Difficulty with intubation is not anticipated. Pt with small mouth and h/o Grade I view Du 2 7.5ETT x 1 attempt     Cardiac Risk Estimation:  Per Revised Cardiac Risk Index patient is a Class I risk with a 3.9% risk of a major cardiac event.      1.) Preoperative workup as follows: ECG, hemoglobin, hematocrit, electrolytes, creatinine, glucose, liver function studies.  2.) Change in medication regimen before surgery: hold NSAIDS 7 days pre op.  3.) Prophylaxis for cardiac events with perioperative beta-blockers: not indicated.  4.) Invasive hemodynamic monitoring perioperatively: not indicated.  5.) Deep vein thrombosis prophylaxis postoperatively: intermittent pneumatic compression boots and regimen to be chosen by surgical team.  6.) Surveillance for postoperative MI with ECG immediately postoperatively and on postoperati ve days 1 and 2 AND troponin levels 24 hours postoperatively and on day 4 or hospital discharge (whichever comes first): not indicated.  7.) Current medications which may produce withdrawal symptoms if withheld perioperatively: none  8.) Other measures: will have pt see pulmonary pre op for clearance.       COPD (chronic obstructive pulmonary disease)  - last exacerbation mid    - daily  spiriva - take AM  of surgery   - inhaler use albuterol   X 2 last week with COVID   - follows with pulmonary last visit 4/2022      Pulmonary nodules/lesions, multiple  - stable at 4/2022 Chest CT f/u in 1 year with imaging     Tobacco abuse  - down to 0.5 ppd was up to 2ppd at one point     Psoriatic arthritis  - hands, feet   - no targeted meds currently       COVID   - s/p paxlovid   - dx 1/3/23  - no coughing noted during interview   - coughing throughout the day pt on mucinex   - pt on oral antihistamines  - no SOB noted on exam today  - O2 sats 97% on RA   - symptoms improving     LBP with R side radiation to R LE posterior thigh to foot   - constant  - has tried icy hot and robaxin ; voltaren offered some relief   - scheduled pt to see PCP LOI Leonard 1/16/22 at 1:15   - flare mid December 2022; prior flare mid 2021     L knee pain  - anticipated  Visco gel injection today, 1/12/22, but cancelled due to insurance

## 2023-01-12 NOTE — PROGRESS NOTES
To confirm, your doctor has instructed you that surgery is scheduled for 2/9/2023.       Pre admit office will call the afternoon prior to surgery between 1PM and 3PM with arrival time.    Surgery will be at [facility],  The address is [address]. [place]  [id number].      IMPORTANT INSTRUCTIONS!    Do not eat or drink after 12 midnight, including water.   Do not smoke or use chewing tobacco after 12 midnight  OK to brush teeth, but no gum, candy, or mints!      Take only these medicines with a small swallow of water-morning of surgery.     Lexapro, spiriva         ____ Stop Aspirin, Ibuprofen, Motrin and Aleve at least 5-7 days before surgery, unless otherwise instructed by your doctor, or the nurse.   You MAY use Tylenol/acetaminophen until day of surgery.      ____  If you take diabetic medication, do NOT take morning of surgery unless instructed by Doctor. Metformin must be stopped 24 hrs prior to surgery time.       ____ Stop taking any Fish Oil supplements or Vitamins at least 5 days prior to surgery, unless instructed otherwise by your Doctor.       Please notify MD office if you have an active infection, currently taking antibiotics or received a vaccination within the past 7 days.      Bathing Instructions: The night before surgery and the morning prior to coming to the hospital:    - Shower & rinse your body as usual with anti-bacterial Soap (Dial or Damien 2000)   -Hibiclens (if indicated) use AFTER anti-bacterial soap; 1 packet PM/1 packet in AM on surgical site only   -Do not use hibiclens on your head, face, or genitals.    -Do not wash with anti-bacterial soap after you use the hibiclens.    -Do not shave surgical site 5-7 days prior to surgery.    -Pubic hair 7 days prior to surgery (gyn pt's).      Pediatric patients do not need to use anti-bacterial soap or Hibiclens.             After Bathing:   __ No powder, lotions, creams, or body spray to skin     __No deodorant for any  breast procedure, PORT, or upper arm surgery     __ No makeup, mascara, nail polish or artificial nails        **SURGERY WILL BE CANCELLED IF ARTIFICIAL/NAIL POLISH IS PRESENT!!!**    __ Please remove all piercings and leave all jewelry at home.    **SURGERY WILL BE CANCELLED IF PIERCINGS ARE PRESENT!!!**      __ Dentures, Hearing Aids and Contact Lens need to be removed prior to the start of surgery.      __ Wear clean, loose-fitting clothing. Allow for dressings/bandages/surgical equipment     __ You must have transportation, and they MUST stay the entire time.       [facility] Visitor/Ride Policy:   Only 1 adult allowed (over the age of 18) to accompany you into Pre-op/Recovery Surgery Dept and must stay through the entire length of admission.     Must have a ride home from a responsible adult that you know and trust.      Pediatric Patients are allowed 2 adult visitors.     Medical Transport, Uber or Lyft can only be used if patient has a responsible adult to accompany them during ride home.         Post-Op Instructions: You will receive surgery post-op instructions by your Discharge Nurse prior to going home.     Surgical Site Infection:   Prevention of surgical site infections:   -Keep incisions clean and dry.   -Do not soak/submerge incisions in water until completely healed.   -Do not apply lotions, powders, creams, or deodorants to site.   -Always make sure hands are cleaned with antibacterial soap/ alcohol-based   prior to touching the surgical site.       Signs and symptoms:               -Redness and pain around the area where you had surgery               -Drainage of cloudy fluid from your surgical wound               -Fever over 100.4 or chills     >>>Call Surgeon office/on-call Surgeon if you experience any of these signs & symptoms post-surgery.        *Please Call [facility] Pre-Admissions Department with surgery instruction questions at [phone] or [phone].     *Insurance Questions,  please call [phone] or [phone]

## 2023-01-12 NOTE — ASSESSMENT & PLAN NOTE
- pt presents at the request of Dr. Chau who plans on performing a rotator cff repair and long head bicep tendon repair on 2/9/23  - Known risk factors for perioperative complications: Chronic pulmonary disease    Difficulty with intubation is not anticipated. Pt with small mouth and h/o Grade I view Du 2 7.5ETT x 1 attempt     Cardiac Risk Estimation:  Per Revised Cardiac Risk Index patient is a Class I risk with a 3.9% risk of a major cardiac event.      1.) Preoperative workup as follows: ECG, hemoglobin, hematocrit, electrolytes, creatinine, glucose, liver function studies.  2.) Change in medication regimen before surgery: hold NSAIDS 7 days pre op.  3.) Prophylaxis for cardiac events with perioperative beta-blockers: not indicated.  4.) Invasive hemodynamic monitoring perioperatively: not indicated.  5.) Deep vein thrombosis prophylaxis postoperatively: intermittent pneumatic compression boots and regimen to be chosen by surgical team.  6.) Surveillance for postoperative MI with ECG immediately postoperatively and on postoperati ve days 1 and 2 AND troponin levels 24 hours postoperatively and on day 4 or hospital discharge (whichever comes first): not indicated.  7.) Current medications which may produce withdrawal symptoms if withheld perioperatively: none  8.) Other measures: will have pt see pulmonary pre op for clearance.

## 2023-01-12 NOTE — ASSESSMENT & PLAN NOTE
- last exacerbation mid 2022   - daily spiriva - take AM  of surgery   - inhaler use albuterol   X 2 last week with COVID   - follows with pulmonary last visit 4/2022

## 2023-01-12 NOTE — TELEPHONE ENCOUNTER
----- Message from Chelsie Mcclure PA-C sent at 1/12/2023  9:13 AM CST -----  Regarding: pre op clearance  GOod morning,   Ms. Ramachandran is coming in for a rotator cuff repair on 2/9 /22 . I would like to get her seen  for a pre op visit. I know she had some out lying testing with Dr. aSenz .   Thank you.

## 2023-01-12 NOTE — DISCHARGE INSTRUCTIONS
To confirm, your doctor has instructed you that surgery is scheduled for 2/9/2023.       Pre admit office will call the afternoon prior to surgery between 1PM and 3PM with arrival time.    Surgery will be at [facility],  The address is [address]. [place]  [id number].      IMPORTANT INSTRUCTIONS!    Do not eat or drink after 12 midnight, including water.   Do not smoke or use chewing tobacco after 12 midnight  OK to brush teeth, but no gum, candy, or mints!      Take only these medicines with a small swallow of water-morning of surgery.     Lexapro, spiriva         ____ Stop Aspirin, Ibuprofen, Motrin and Aleve at least 5-7 days before surgery, unless otherwise instructed by your doctor, or the nurse.   You MAY use Tylenol/acetaminophen until day of surgery.      ____  If you take diabetic medication, do NOT take morning of surgery unless instructed by Doctor. Metformin must be stopped 24 hrs prior to surgery time.       ____ Stop taking any Fish Oil supplements or Vitamins at least 5 days prior to surgery, unless instructed otherwise by your Doctor.       Please notify MD office if you have an active infection, currently taking antibiotics or received a vaccination within the past 7 days.      Bathing Instructions: The night before surgery and the morning prior to coming to the hospital:    - Shower & rinse your body as usual with anti-bacterial Soap (Dial or Damien 2000)   -Hibiclens (if indicated) use AFTER anti-bacterial soap; 1 packet PM/1 packet in AM on surgical site only   -Do not use hibiclens on your head, face, or genitals.    -Do not wash with anti-bacterial soap after you use the hibiclens.    -Do not shave surgical site 5-7 days prior to surgery.    -Pubic hair 7 days prior to surgery (gyn pt's).      Pediatric patients do not need to use anti-bacterial soap or Hibiclens.             After Bathing:   __ No powder, lotions, creams, or body spray to skin     __No deodorant for any  breast procedure, PORT, or upper arm surgery     __ No makeup, mascara, nail polish or artificial nails        **SURGERY WILL BE CANCELLED IF ARTIFICIAL/NAIL POLISH IS PRESENT!!!**    __ Please remove all piercings and leave all jewelry at home.    **SURGERY WILL BE CANCELLED IF PIERCINGS ARE PRESENT!!!**      __ Dentures, Hearing Aids and Contact Lens need to be removed prior to the start of surgery.      __ Wear clean, loose-fitting clothing. Allow for dressings/bandages/surgical equipment     __ You must have transportation, and they MUST stay the entire time.       [facility] Visitor/Ride Policy:   Only 1 adult allowed (over the age of 18) to accompany you into Pre-op/Recovery Surgery Dept and must stay through the entire length of admission.     Must have a ride home from a responsible adult that you know and trust.      Pediatric Patients are allowed 2 adult visitors.     Medical Transport, Uber or Lyft can only be used if patient has a responsible adult to accompany them during ride home.         Post-Op Instructions: You will receive surgery post-op instructions by your Discharge Nurse prior to going home.     Surgical Site Infection:   Prevention of surgical site infections:   -Keep incisions clean and dry.   -Do not soak/submerge incisions in water until completely healed.   -Do not apply lotions, powders, creams, or deodorants to site.   -Always make sure hands are cleaned with antibacterial soap/ alcohol-based   prior to touching the surgical site.       Signs and symptoms:               -Redness and pain around the area where you had surgery               -Drainage of cloudy fluid from your surgical wound               -Fever over 100.4 or chills     >>>Call Surgeon office/on-call Surgeon if you experience any of these signs & symptoms post-surgery.        *Please Call [facility] Pre-Admissions Department with surgery instruction questions at [phone] or [phone].     *Insurance Questions,  please call [phone] or [phone]

## 2023-01-23 ENCOUNTER — OFFICE VISIT (OUTPATIENT)
Dept: PULMONOLOGY | Facility: CLINIC | Age: 58
End: 2023-01-23
Payer: MEDICAID

## 2023-01-23 ENCOUNTER — TELEPHONE (OUTPATIENT)
Dept: ORTHOPEDICS | Facility: CLINIC | Age: 58
End: 2023-01-23
Payer: MEDICAID

## 2023-01-23 ENCOUNTER — HOSPITAL ENCOUNTER (OUTPATIENT)
Dept: RADIOLOGY | Facility: HOSPITAL | Age: 58
Discharge: HOME | End: 2023-01-23
Attending: INTERNAL MEDICINE
Payer: MEDICAID

## 2023-01-23 ENCOUNTER — CLINICAL SUPPORT (OUTPATIENT)
Dept: PULMONOLOGY | Facility: CLINIC | Age: 58
End: 2023-01-23
Payer: MEDICAID

## 2023-01-23 VITALS
WEIGHT: 138.69 LBS | RESPIRATION RATE: 18 BRPM | HEART RATE: 85 BPM | OXYGEN SATURATION: 98 % | DIASTOLIC BLOOD PRESSURE: 68 MMHG | BODY MASS INDEX: 23.11 KG/M2 | HEIGHT: 65 IN | SYSTOLIC BLOOD PRESSURE: 130 MMHG

## 2023-01-23 DIAGNOSIS — F17.218 CIGARETTE NICOTINE DEPENDENCE WITH OTHER NICOTINE-INDUCED DISORDER: ICD-10-CM

## 2023-01-23 DIAGNOSIS — J44.89 COPD WITH ASTHMA: ICD-10-CM

## 2023-01-23 DIAGNOSIS — J44.9 CHRONIC OBSTRUCTIVE PULMONARY DISEASE, UNSPECIFIED COPD TYPE: Primary | ICD-10-CM

## 2023-01-23 DIAGNOSIS — Z72.0 TOBACCO ABUSE: ICD-10-CM

## 2023-01-23 DIAGNOSIS — R91.8 PULMONARY NODULES/LESIONS, MULTIPLE: ICD-10-CM

## 2023-01-23 LAB
ALLENS TEST: ABNORMAL
BRPFT: NORMAL
DELSYS: ABNORMAL
FEF 25 75 CHG: 31.8 %
FEF 25 75 LLN: 1.28
FEF 25 75 POST REF: 90.3 %
FEF 25 75 PRE REF: 68.5 %
FEF 25 75 REF: 2.43
FET100 CHG: -30.5 %
FEV1 CHG: 0.9 %
FEV1 FVC CHG: 5.6 %
FEV1 FVC LLN: 68
FEV1 FVC POST REF: 96 %
FEV1 FVC PRE REF: 90.9 %
FEV1 FVC REF: 79
FEV1 LLN: 2.02
FEV1 POST REF: 95.9 %
FEV1 PRE REF: 95 %
FEV1 REF: 2.65
FIO2: 21
FVC CHG: -4.5 %
FVC LLN: 2.56
FVC POST REF: 99.2 %
FVC PRE REF: 103.8 %
FVC REF: 3.35
HCO3 UR-SCNC: 26.7 MMOL/L (ref 24–28)
MODE: ABNORMAL
PCO2 BLDA: 33.7 MMHG (ref 35–45)
PEF CHG: 4.3 %
PEF LLN: 4.83
PEF POST REF: 92.6 %
PEF PRE REF: 88.8 %
PEF REF: 6.6
PH SMN: 7.51 [PH] (ref 7.35–7.45)
PO2 BLDA: 89 MMHG (ref 80–100)
POC BE: 4 MMOL/L
POC SATURATED O2: 98 % (ref 95–100)
POST FEF 25 75: 2.2 L/S (ref 1.28–3.59)
POST FET 100: 8.45 SEC
POST FEV1 FVC: 76.31 % (ref 67.76–91.23)
POST FEV1: 2.54 L (ref 2.02–3.28)
POST FVC: 3.33 L (ref 2.56–4.14)
POST PEF: 6.11 L/S (ref 4.83–8.36)
PRE FEF 25 75: 1.67 L/S (ref 1.28–3.59)
PRE FET 100: 12.16 SEC
PRE FEV1 FVC: 72.29 % (ref 67.76–91.23)
PRE FEV1: 2.52 L (ref 2.02–3.28)
PRE FVC: 3.48 L (ref 2.56–4.14)
PRE PEF: 5.86 L/S (ref 4.83–8.36)
SAMPLE: ABNORMAL
SITE: ABNORMAL

## 2023-01-23 PROCEDURE — 1159F PR MEDICATION LIST DOCUMENTED IN MEDICAL RECORD: ICD-10-PCS | Mod: CPTII,,, | Performed by: PHYSICIAN ASSISTANT

## 2023-01-23 PROCEDURE — 99214 PR OFFICE/OUTPT VISIT, EST, LEVL IV, 30-39 MIN: ICD-10-PCS | Mod: S$PBB,25,, | Performed by: PHYSICIAN ASSISTANT

## 2023-01-23 PROCEDURE — 94060 EVALUATION OF WHEEZING: CPT | Mod: 26,S$PBB,, | Performed by: INTERNAL MEDICINE

## 2023-01-23 PROCEDURE — 3078F PR MOST RECENT DIASTOLIC BLOOD PRESSURE < 80 MM HG: ICD-10-PCS | Mod: CPTII,,, | Performed by: PHYSICIAN ASSISTANT

## 2023-01-23 PROCEDURE — 36600 WITHDRAWAL OF ARTERIAL BLOOD: CPT | Mod: S$PBB,59,, | Performed by: INTERNAL MEDICINE

## 2023-01-23 PROCEDURE — 36600 WITHDRAWAL OF ARTERIAL BLOOD: CPT | Mod: PBBFAC

## 2023-01-23 PROCEDURE — 3075F PR MOST RECENT SYSTOLIC BLOOD PRESS GE 130-139MM HG: ICD-10-PCS | Mod: CPTII,,, | Performed by: PHYSICIAN ASSISTANT

## 2023-01-23 PROCEDURE — 1160F PR REVIEW ALL MEDS BY PRESCRIBER/CLIN PHARMACIST DOCUMENTED: ICD-10-PCS | Mod: CPTII,,, | Performed by: PHYSICIAN ASSISTANT

## 2023-01-23 PROCEDURE — 99214 OFFICE O/P EST MOD 30 MIN: CPT | Mod: S$PBB,25,, | Performed by: PHYSICIAN ASSISTANT

## 2023-01-23 PROCEDURE — 94060 EVALUATION OF WHEEZING: CPT | Mod: PBBFAC

## 2023-01-23 PROCEDURE — 36600 PR WITHDRAWAL OF ARTERIAL BLOOD: ICD-10-PCS | Mod: S$PBB,59,, | Performed by: INTERNAL MEDICINE

## 2023-01-23 PROCEDURE — 3075F SYST BP GE 130 - 139MM HG: CPT | Mod: CPTII,,, | Performed by: PHYSICIAN ASSISTANT

## 2023-01-23 PROCEDURE — 99215 OFFICE O/P EST HI 40 MIN: CPT | Mod: PBBFAC,25 | Performed by: PHYSICIAN ASSISTANT

## 2023-01-23 PROCEDURE — 1159F MED LIST DOCD IN RCRD: CPT | Mod: CPTII,,, | Performed by: PHYSICIAN ASSISTANT

## 2023-01-23 PROCEDURE — 71046 X-RAY EXAM CHEST 2 VIEWS: CPT | Mod: TC

## 2023-01-23 PROCEDURE — 3008F BODY MASS INDEX DOCD: CPT | Mod: CPTII,,, | Performed by: PHYSICIAN ASSISTANT

## 2023-01-23 PROCEDURE — 3008F PR BODY MASS INDEX (BMI) DOCUMENTED: ICD-10-PCS | Mod: CPTII,,, | Performed by: PHYSICIAN ASSISTANT

## 2023-01-23 PROCEDURE — 99999 PR PBB SHADOW E&M-EST. PATIENT-LVL V: CPT | Mod: PBBFAC,,, | Performed by: PHYSICIAN ASSISTANT

## 2023-01-23 PROCEDURE — 71046 X-RAY EXAM CHEST 2 VIEWS: CPT | Mod: 26,,, | Performed by: RADIOLOGY

## 2023-01-23 PROCEDURE — 3078F DIAST BP <80 MM HG: CPT | Mod: CPTII,,, | Performed by: PHYSICIAN ASSISTANT

## 2023-01-23 PROCEDURE — 71046 XR CHEST PA AND LATERAL: ICD-10-PCS | Mod: 26,,, | Performed by: RADIOLOGY

## 2023-01-23 PROCEDURE — 82803 BLOOD GASES ANY COMBINATION: CPT | Mod: PBBFAC

## 2023-01-23 PROCEDURE — 90677 PCV20 VACCINE IM: CPT | Mod: PBBFAC

## 2023-01-23 PROCEDURE — 99999 PR PBB SHADOW E&M-EST. PATIENT-LVL V: ICD-10-PCS | Mod: PBBFAC,,, | Performed by: PHYSICIAN ASSISTANT

## 2023-01-23 PROCEDURE — 1160F RVW MEDS BY RX/DR IN RCRD: CPT | Mod: CPTII,,, | Performed by: PHYSICIAN ASSISTANT

## 2023-01-23 PROCEDURE — 94060 PR EVAL OF BRONCHOSPASM: ICD-10-PCS | Mod: 26,S$PBB,, | Performed by: INTERNAL MEDICINE

## 2023-01-23 RX ORDER — ONDANSETRON 4 MG/1
4 TABLET, ORALLY DISINTEGRATING ORAL EVERY 8 HOURS PRN
COMMUNITY
Start: 2022-08-26

## 2023-01-23 NOTE — PROCEDURES
ABG completed. See ABG Below.    Component      Latest Ref Rng & Units 1/23/2023   POC PH      7.35 - 7.45 7.508 (H)   POC PCO2      35 - 45 mmHg 33.7 (L)   POC PO2      80 - 100 mmHg 89   POC HCO3      24 - 28 mmol/L 26.7   POC BE      -2 to 2 mmol/L 4   POC SATURATED O2      95 - 100 % 98   Sample       ARTERIAL   Site       RR   Allens Test       Pass   DelSys       Room Air   Mode       SPONT   FiO2       21         Interpretation:  [x] Arterial blood gases on room air demonstrate normal  pO2  [x] Arterial blood gases on room air are abnormal demonstrating hypercarbia (pCO2 >45 mmHg)  [] Arterial blood gases on room air are abnormal demonstrating hypocarbia (pCO2 < 35 mmHg)  [] Arterial blood gases on room air are abnormal demonstrating hypoxemia (pO2 < 80 mmHg)  [] Arterial blood gases on room air are abnormal demonstrating hyperoxemia (pO2 >120 mmHg)  [] Arterial blood gases on room air are abnormal demonstrating hypoxemia (pO2 < 80 mmHg) and hypercarbia (pCO2>45 mmHg)    The table below summarizes the main interpretations of the relationship between the arterial blood gases, pH, pCO2 and HCO-3    [x]MIXED  RESPIRATORY and METABOLIC ALKALOSIS    I

## 2023-01-23 NOTE — TELEPHONE ENCOUNTER
Spoke with patient and let her know it is okay it drop the paperwork off in person or fax it over to us. Patient was grateful for the call. -NS    ----- Message from Lillian Yap sent at 1/23/2023 12:36 PM CST -----  Contact: Radha Garcia is calling to speak to the nurse regarding her short term disability paperwork. She would like to know how can she get it to the office to be completed . Please contact her at [phone]    Thanks  LJ

## 2023-01-23 NOTE — ASSESSMENT & PLAN NOTE
Continue spiriva daily  Albuterol as needed  Boss normal  ABG with chronic respiratory alkalosis  Stressed smoking cessation

## 2023-01-23 NOTE — PROGRESS NOTES
Subjective:       Patient ID: Radha Ramachandran is a 57 y.o. female.    Chief Complaint: Pulmonary Clearance      58yo female here for pulmonary risk assessment visit  History of COPD on Spiriva  Current smoker, had cut down to a pack a day  Scheduled for right rotator cuff repair with Dr. Chau 2/9/23  Had covid 1/3-1/11/23, has some residual fatigue  Cough and congestion have resolved  No fever or chest pain  SOB on exertion at baseline, uses albuterol inhaler as needed which relives her shortness of breath, has used it three times this month  Attributes some of her shortness of breath to anxiety  No history of post operative complications      Immunization History   Administered Date(s) Administered    COVID-19, MRNA, LN-S, PF (Pfizer) (Purple Cap) 04/01/2021, 04/23/2021    Influenza - Quadrivalent 08/27/2015    Influenza - Quadrivalent - MDCK 10/19/2021    Influenza - Quadrivalent - PF *Preferred* (6 months and older) 09/21/2016, 02/13/2019    Pneumococcal Conjugate - 20 Valent 01/23/2023    Tdap 04/28/2020    Zoster 03/21/2016, 03/25/2016      Tobacco Use: High Risk    Smoking Tobacco Use: Every Day    Smokeless Tobacco Use: Never    Passive Exposure: Not on file      Past Medical History:   Diagnosis Date    COPD with asthma     Digestive disorder     Fibromyalgia     H/O psoriatic arthritis     Hx of migraines     Post-traumatic osteoarthritis of left shoulder 12/15/2020    Rheumatoid arthritis       Current Outpatient Medications on File Prior to Visit   Medication Sig Dispense Refill    albuterol (PROVENTIL/VENTOLIN HFA) 90 mcg/actuation inhaler INHALE 1 PUFF EVERY 4 HOURS AS NEEDED 18 g 2    ALBUTEROL INHL Inhale into the lungs.      diclofenac sodium (VOLTAREN) 1 % Gel Apply 2 g topically 4 (four) times daily. 2 g 2    EScitalopram oxalate (LEXAPRO) 20 MG tablet Take 20 mg by mouth once daily.      fluticasone propionate (FLONASE) 50 mcg/actuation nasal spray 1 spray by Each Nostril route once daily.       levocetirizine (XYZAL) 5 MG tablet Take 5 mg by mouth every evening.      lidocaine-prilocaine (EMLA) cream       meloxicam (MOBIC) 7.5 MG tablet TAKE 1 TABLET BY MOUTH EVERY DAY 30 tablet 2    methocarbamoL (ROBAXIN) 500 MG Tab TAKE 1 TABLET (500 MG TOTAL) BY MOUTH NIGHTLY AS NEEDED (MUSCLE SPASMS OR PAIN). 30 tablet 0    ondansetron (ZOFRAN-ODT) 4 MG TbDL Take 4 mg by mouth every 8 (eight) hours as needed.      SPIRIVA WITH HANDIHALER 18 mcg inhalation capsule SMARTSI Puff(s) Via Inhaler Daily      aspirin (ECOTRIN) 81 MG EC tablet Take 1 tablet (81 mg total) by mouth 2 (two) times a day. for 14 days 28 tablet 0    cefdinir (OMNICEF) 300 MG capsule Take 300 mg by mouth once daily.      famotidine (PEPCID) 40 MG tablet Take 40 mg by mouth 2 (two) times daily.      nitrofurantoin, macrocrystal-monohydrate, (MACROBID) 100 MG capsule Take 100 mg by mouth 2 (two) times daily.      oxyCODONE (ROXICODONE) 5 MG immediate release tablet Take 1 tablet (5 mg total) by mouth every 6 (six) hours as needed for Pain (post-op pain). 42 tablet 0    traMADoL (ULTRAM) 50 mg tablet Take 1 tablet (50 mg total) by mouth every 6 (six) hours as needed for Pain. 30 tablet 0    traMADoL (ULTRAM) 50 mg tablet Take 1 tablet (50 mg total) by mouth every 6 (six) hours as needed for Pain. 30 tablet 0     Current Facility-Administered Medications on File Prior to Visit   Medication Dose Route Frequency Provider Last Rate Last Admin    lactated ringers infusion   Intravenous Continuous Caprice Yin MD   New Bag at 21 1402    methacholine chloride 0 mg/3 mL (0 mg/mL) nebulizer solution - white vial 3 mL  3 mL Nebulization Once Brendan Saenz MD        methacholine chloride 0.1875 mg/3 mL (0.0625 mg/mL) nebulizer solution - red vial 0.1875 mg  3 mL Nebulization Once Brendan Saenz MD        methacholine chloride 0.75 mg/3 mL (0.25 mg/mL) nebulizer solution - orange vial 0.75 mg  3 mL Nebulization Once Brendan Saenz MD  "       methacholine chloride 12 mg/3 mL (4 mg/mL) nebulizer solution - green vial 12 mg  3 mL Nebulization Once Brendan Saenz MD        methacholine chloride 3 mg/3 mL (1 mg/mL) nebulizer solution - yellow vial 3 mg  3 mL Nebulization Once Brendan Saenz MD        methacholine chloride 48 mg/3 mL (16 mg/mL) nebulizer solution - blue vial 48 mg  3 mL Nebulization Once Brendan Saenz MD            Review of Systems   Constitutional:  Negative for fever, weight loss, appetite change, fatigue and weakness.   HENT:  Negative for postnasal drip, rhinorrhea, sinus pressure, trouble swallowing and congestion.    Respiratory:  Positive for dyspnea on extertion. Negative for cough, sputum production, choking, chest tightness, shortness of breath and wheezing.    Cardiovascular:  Negative for chest pain and leg swelling.   Musculoskeletal:  Negative for arthralgias, gait problem and joint swelling.   Gastrointestinal:  Negative for nausea, vomiting and abdominal pain.   Neurological:  Negative for dizziness, weakness and headaches.   All other systems reviewed and are negative.    Objective:       Vitals:    01/23/23 1437   BP: 130/68   Pulse: 85   Resp: 18   SpO2: 98%   Weight: 62.9 kg (138 lb 10.7 oz)   Height: 5' 5" (1.651 m)       Physical Exam   Constitutional: She is oriented to person, place, and time. She appears well-developed and well-nourished. No distress.   HENT:   Head: Normocephalic.   Nose: Nose normal.   Mouth/Throat: Oropharynx is clear and moist.   Cardiovascular: Normal rate and regular rhythm.   Pulmonary/Chest: Effort normal and breath sounds normal. No respiratory distress. She has no wheezes. She has no rhonchi. She has no rales.   Musculoskeletal:         General: No edema.      Cervical back: Normal range of motion and neck supple.   Lymphadenopathy: No supraclavicular adenopathy is present.     She has no cervical adenopathy.   Neurological: She is alert and oriented to person, place, " and time. Gait normal.   Skin: Skin is warm and dry.   Psychiatric: She has a normal mood and affect.   Vitals reviewed.  Personal Diagnostic Review    X-Ray Chest PA And Lateral  Narrative: EXAMINATION:  XR CHEST PA AND LATERAL    CLINICAL HISTORY:  Chronic obstructive pulmonary disease, unspecified    TECHNIQUE:  PA and lateral views of the chest were performed.    COMPARISON:  10/19/2021    FINDINGS:  The lungs are clear and free of infiltrate.  No pleural effusion or pneumothorax. The heart is not enlarged.  Impression: 1.  No acute cardiopulmonary process.    Electronically signed by: Ricky Bardales DO  Date:    01/23/2023  Time:    13:50      Recent Labs     01/23/23  1401   PH 7.508*   PCO2 33.7*   PO2 89   HCO3 26.7   POCSATURATED 98   BE 4         PRE OPERATIVE PULMONARY RISK ASSESSMENT:      SURGEON: Dr. adames  PROCEDURE: right rotator cuff repair     ANESTHESIA: GA     Patient is a MILD risk for perioperative pulmonary complications due to:      [x]            Recent COVID infection         [x]            COPD or asthma               []            MACEY  []            Pulmonary vascular disease   []            Pleural disease  []            Pulmonary vasculitis  []            Hypoventilation ( chest wall deformity, neuromuscular disease, obesity etc)  []            Anemia  []            Thyroid disease.  []            Cardiac illess  [x]            General Anesthsia  []            long-acting neuromuscular blockade.     Risks and possible pulmonary complications of surgery include risk of respiratory failure requiring mechanical ventilation, post operative pneumonia, post operative atelectasis, deep venous thrombosis, pulmonary embolism and death.    Based on my assessment , it is okay to proceed with procedure PROVIDED RISK IS ACCEPTABLE TO BOTH THE PATIENT AND THE SURGEON PERFORMING THE SURGERY.     ARISCAT Score 20       0 to 25 points: Low risk: 1.6% pulmonary complication rate   26 to 44 points:  Intermediate risk: 13.3% pulmonary complication rate   45 to 123 points: High risk: 42.1% pulmonary complication rate            RECOMMENDATIONS FOR RISK MITIGATION:    1. Preoperative pulmonary workup:  complete  2. Change in medication regimen before surgery: none, continue medication regimen including morning of surgery, with sip of water.  3. Prophylaxis for cardiac events with perioperative beta-blockers: should be considered, specific regimen per anesthesia.  4. Invasive hemodynamic monitoring perioperatively: should be considered.  5. Deep vein thrombosis prophylaxis postoperatively:regimen to be chosen by surgical team.  6. Surveillance for postoperative MI with ECG immediately postoperatively and on postoperative days 1 and 2 AND troponin levels 24 hours postoperatively and on day 4 or hospital discharge (whichever comes first): should be considered.  7. Begin patient education regarding lung-expansion maneuvers like deep breathing and incentive spirometry.   8. Limit duration of surgery to less than 3 hr if possible.  9. Use spinal or epidural anesthesia if possible.  10. Avoid use of pancuronium or long acting neuromuscular blockers.  11. Use deep-breathing exercises or incentive spirometry.  12. Assure perioperative thromboprophylaxis and adequate post operative analgesia.         Assessment/Plan:       Problem List Items Addressed This Visit          Pulmonary    COPD (chronic obstructive pulmonary disease) - Primary     Continue spiriva daily  Albuterol as needed  [place] normal  ABG with chronic respiratory alkalosis  Stressed smoking cessation         Relevant Orders    (In Office Administered) Pneumococcal Conjugate Vaccine (20 Valent) (IM) (Completed)    Pulmonary nodules/lesions, multiple     Stable 4/2022  Follow up Chest CT 4/2023            Other    Tobacco abuse     Assistance with smoking cessation was offered, including:  []  Medications  [x]  Counseling  []  Printed Information on Smoking  Cessation  [x]  Referral to a Smoking Cessation Program    Patient was counseled regarding smoking for 3-10 minutes.              Other Visit Diagnoses       Cigarette nicotine dependence with other nicotine-induced disorder        Relevant Orders    CT Chest Lung Screening Low Dose        Based on my assessment , it is okay to proceed with procedure PROVIDED RISK IS ACCEPTABLE TO BOTH THE PATIENT AND THE SURGEON PERFORMING THE SURGERY.    Follow up in about 3 months (around 4/23/2023) for pcv20 today; ldct next.    Discussed diagnosis, its evaluation, treatment and usual course. All questions answered.    Patient verbalized understanding of plan and left in no acute distress    Thank you for the courtesy of participating in the care of this patient    Delicia Valles PA-C  [facility]

## 2023-01-25 DIAGNOSIS — Z98.890 S/P RIGHT ROTATOR CUFF REPAIR: Primary | ICD-10-CM

## 2023-02-01 ENCOUNTER — TELEPHONE (OUTPATIENT)
Dept: ORTHOPEDICS | Facility: CLINIC | Age: 58
End: 2023-02-01
Payer: MEDICAID

## 2023-02-01 NOTE — TELEPHONE ENCOUNTER
Spoke with patient and let her know of her return to work date for her paperwork. Also gave her the number to call and get her physical therapy scheduled. Patient was grateful for the call. -NS    ----- Message from Laureano Stubbs sent at 2/1/2023 10:22 AM CST -----  Regarding: PLS READ TODAY NEEDS TO BE COMPLETED TODAY  Contact: self  Pt is calling in regard to her short term disability needs to know the expected time off with [employer] she stated that the paper was sent over fax      Pls call the pt back at [phone] to give a needed update      Thank you    Jacqueline

## 2023-02-08 ENCOUNTER — ANESTHESIA EVENT (OUTPATIENT)
Dept: SURGERY | Facility: HOSPITAL | Age: 58
End: 2023-02-08
Payer: MEDICAID

## 2023-02-08 NOTE — ANESTHESIA PREPROCEDURE EVALUATION
02/08/2023  Radha Ramachandran is a 57 y.o., female.    Past Surgical History:   Procedure Laterality Date    ANKLE FUSION Right     APPENDECTOMY      ARTHROPLASTY OF SHOULDER Left 1/25/2021    Procedure: ARTHROPLASTY, SHOULDER;  Surgeon: Ramesh Chau MD;  Location: [facility];  Service: Orthopedics;  Laterality: Left;    CHOLECYSTECTOMY      EYELID LACERATION REPAIR Left     HYSTERECTOMY      OPEN REDUCTION AND INTERNAL FIXATION (ORIF) OF INJURY OF HAND  right    ORIF TIBIA & FIBULA FRACTURES Right        Past Medical History:   Diagnosis Date    COPD with asthma     Digestive disorder     Fibromyalgia     H/O psoriatic arthritis     Hx of migraines     Post-traumatic osteoarthritis of left shoulder 12/15/2020    Rheumatoid arthritis            Pre-op Assessment    I have reviewed the Patient Summary Reports.     I have reviewed the Nursing Notes. I have reviewed the NPO Status.   I have reviewed the Medications.     Review of Systems  Anesthesia Hx:  No problems with previous Anesthesia  Denies Family Hx of Anesthesia complications.   Denies Personal Hx of Anesthesia complications.   Social:  Non-Smoker    Hematology/Oncology:  Hematology Normal        Cardiovascular:  Cardiovascular Normal     Pulmonary:   COPD Asthma    Renal/:  Renal/ Normal     Hepatic/GI:  Hepatic/GI Normal    Musculoskeletal:   Arthritis     Neurological:   Neuromuscular Disease,    Psych:  Psychiatric Normal           Physical Exam  General: Alert and Oriented    Airway:  Mallampati: II   Mouth Opening: Normal  TM Distance: Normal  Tongue: Normal  Neck ROM: Normal ROM    Dental:  Edentulous    Chest/Lungs:  Clear to auscultation, Normal Respiratory Rate    Heart:  Rate: Normal  Rhythm: Regular Rhythm        Anesthesia Plan  Type of Anesthesia, risks & benefits discussed:    Anesthesia Type: Gen  ETT  Intra-op Monitoring Plan: Standard ASA Monitors  Post Op Pain Control Plan: multimodal analgesia, IV/PO Opioids PRN and peripheral nerve block  Induction:  IV  Airway Plan: Direct  Informed Consent: Informed consent signed with the Patient and all parties understand the risks and agree with anesthesia plan.  All questions answered.   ASA Score: 3  Day of Surgery Review of History & Physical: H&P Update referred to the surgeon/provider.    Ready For Surgery From Anesthesia Perspective.     .

## 2023-02-09 ENCOUNTER — HOSPITAL ENCOUNTER (OUTPATIENT)
Facility: HOSPITAL | Age: 58
Discharge: HOME | End: 2023-02-09
Attending: STUDENT IN AN ORGANIZED HEALTH CARE EDUCATION/TRAINING PROGRAM | Admitting: STUDENT IN AN ORGANIZED HEALTH CARE EDUCATION/TRAINING PROGRAM
Payer: MEDICAID

## 2023-02-09 ENCOUNTER — ANESTHESIA (OUTPATIENT)
Dept: SURGERY | Facility: HOSPITAL | Age: 58
End: 2023-02-09
Payer: MEDICAID

## 2023-02-09 VITALS
OXYGEN SATURATION: 96 % | HEIGHT: 65 IN | BODY MASS INDEX: 22.77 KG/M2 | DIASTOLIC BLOOD PRESSURE: 56 MMHG | WEIGHT: 136.69 LBS | RESPIRATION RATE: 18 BRPM | HEART RATE: 65 BPM | SYSTOLIC BLOOD PRESSURE: 112 MMHG | TEMPERATURE: 98 F

## 2023-02-09 DIAGNOSIS — M75.121 NONTRAUMATIC COMPLETE TEAR OF RIGHT ROTATOR CUFF: Primary | ICD-10-CM

## 2023-02-09 PROCEDURE — 29827 SHO ARTHRS SRG RT8TR CUF RPR: CPT | Mod: AS,RT,, | Performed by: PHYSICIAN ASSISTANT

## 2023-02-09 PROCEDURE — 25000003 PHARM REV CODE 250: Performed by: NURSE ANESTHETIST, CERTIFIED REGISTERED

## 2023-02-09 PROCEDURE — 36000711: Performed by: STUDENT IN AN ORGANIZED HEALTH CARE EDUCATION/TRAINING PROGRAM

## 2023-02-09 PROCEDURE — 25000003 PHARM REV CODE 250: Performed by: PHYSICIAN ASSISTANT

## 2023-02-09 PROCEDURE — 37000009 HC ANESTHESIA EA ADD 15 MINS: Performed by: STUDENT IN AN ORGANIZED HEALTH CARE EDUCATION/TRAINING PROGRAM

## 2023-02-09 PROCEDURE — 01630 ANES OPN/ARTHR PX SHO JT NOS: CPT | Performed by: STUDENT IN AN ORGANIZED HEALTH CARE EDUCATION/TRAINING PROGRAM

## 2023-02-09 PROCEDURE — 29827 PR SHLDR ARTHROSCOP,SURG,W/ROTAT CUFF REPR: ICD-10-PCS | Mod: RT,,, | Performed by: STUDENT IN AN ORGANIZED HEALTH CARE EDUCATION/TRAINING PROGRAM

## 2023-02-09 PROCEDURE — 29827 SHO ARTHRS SRG RT8TR CUF RPR: CPT | Mod: RT,,, | Performed by: STUDENT IN AN ORGANIZED HEALTH CARE EDUCATION/TRAINING PROGRAM

## 2023-02-09 PROCEDURE — 29827 PR SHLDR ARTHROSCOP,SURG,W/ROTAT CUFF REPR: ICD-10-PCS | Mod: AS,RT,, | Performed by: PHYSICIAN ASSISTANT

## 2023-02-09 PROCEDURE — 63600175 PHARM REV CODE 636 W HCPCS: Performed by: PHYSICIAN ASSISTANT

## 2023-02-09 PROCEDURE — 63600175 PHARM REV CODE 636 W HCPCS: Performed by: ANESTHESIOLOGY

## 2023-02-09 PROCEDURE — 64415 PERIPHERAL BLOCK: ICD-10-PCS | Mod: 59,RT,, | Performed by: ANESTHESIOLOGY

## 2023-02-09 PROCEDURE — D9220A PRA ANESTHESIA: Mod: CRNA,,, | Performed by: NURSE ANESTHETIST, CERTIFIED REGISTERED

## 2023-02-09 PROCEDURE — D9220A PRA ANESTHESIA: ICD-10-PCS | Mod: ANES,,, | Performed by: ANESTHESIOLOGY

## 2023-02-09 PROCEDURE — 27201423 OPTIME MED/SURG SUP & DEVICES STERILE SUPPLY: Performed by: STUDENT IN AN ORGANIZED HEALTH CARE EDUCATION/TRAINING PROGRAM

## 2023-02-09 PROCEDURE — D9220A PRA ANESTHESIA: Mod: ANES,,, | Performed by: ANESTHESIOLOGY

## 2023-02-09 PROCEDURE — 76942 ECHO GUIDE FOR BIOPSY: CPT | Performed by: ANESTHESIOLOGY

## 2023-02-09 PROCEDURE — 63600175 PHARM REV CODE 636 W HCPCS: Performed by: NURSE ANESTHETIST, CERTIFIED REGISTERED

## 2023-02-09 PROCEDURE — 64415 NJX AA&/STRD BRCH PLXS IMG: CPT | Mod: 59,RT,, | Performed by: ANESTHESIOLOGY

## 2023-02-09 PROCEDURE — D9220A PRA ANESTHESIA: ICD-10-PCS | Mod: CRNA,,, | Performed by: NURSE ANESTHETIST, CERTIFIED REGISTERED

## 2023-02-09 PROCEDURE — 71000015 HC POSTOP RECOV 1ST HR: Performed by: STUDENT IN AN ORGANIZED HEALTH CARE EDUCATION/TRAINING PROGRAM

## 2023-02-09 PROCEDURE — C1713 ANCHOR/SCREW BN/BN,TIS/BN: HCPCS | Performed by: STUDENT IN AN ORGANIZED HEALTH CARE EDUCATION/TRAINING PROGRAM

## 2023-02-09 PROCEDURE — 71000033 HC RECOVERY, INTIAL HOUR: Performed by: STUDENT IN AN ORGANIZED HEALTH CARE EDUCATION/TRAINING PROGRAM

## 2023-02-09 PROCEDURE — 37000008 HC ANESTHESIA 1ST 15 MINUTES: Performed by: STUDENT IN AN ORGANIZED HEALTH CARE EDUCATION/TRAINING PROGRAM

## 2023-02-09 PROCEDURE — 64450 NJX AA&/STRD OTHER PN/BRANCH: CPT | Performed by: STUDENT IN AN ORGANIZED HEALTH CARE EDUCATION/TRAINING PROGRAM

## 2023-02-09 PROCEDURE — 63600175 PHARM REV CODE 636 W HCPCS: Performed by: STUDENT IN AN ORGANIZED HEALTH CARE EDUCATION/TRAINING PROGRAM

## 2023-02-09 PROCEDURE — 36000710: Performed by: STUDENT IN AN ORGANIZED HEALTH CARE EDUCATION/TRAINING PROGRAM

## 2023-02-09 DEVICE — ANCHOR SWIVELOCK KNTLS BLUE #2: Type: IMPLANTABLE DEVICE | Site: SHOULDER | Status: FUNCTIONAL

## 2023-02-09 DEVICE — SYS SWIVELOCK LNT 4.75BC: Type: IMPLANTABLE DEVICE | Site: SHOULDER | Status: FUNCTIONAL

## 2023-02-09 DEVICE — ANCHOR FIBERTAK 2.6 SOFT DL: Type: IMPLANTABLE DEVICE | Site: SHOULDER | Status: FUNCTIONAL

## 2023-02-09 RX ORDER — ONDANSETRON 2 MG/ML
4 INJECTION INTRAMUSCULAR; INTRAVENOUS ONCE AS NEEDED
Status: DISCONTINUED | OUTPATIENT
Start: 2023-02-09 | End: 2023-02-09 | Stop reason: HOSPADM

## 2023-02-09 RX ORDER — ONDANSETRON 2 MG/ML
INJECTION INTRAMUSCULAR; INTRAVENOUS
Status: DISCONTINUED | OUTPATIENT
Start: 2023-02-09 | End: 2023-02-09

## 2023-02-09 RX ORDER — PROPOFOL 10 MG/ML
VIAL (ML) INTRAVENOUS
Status: DISCONTINUED | OUTPATIENT
Start: 2023-02-09 | End: 2023-02-09

## 2023-02-09 RX ORDER — MEPERIDINE HYDROCHLORIDE 25 MG/ML
12.5 INJECTION INTRAMUSCULAR; INTRAVENOUS; SUBCUTANEOUS ONCE
Status: DISCONTINUED | OUTPATIENT
Start: 2023-02-09 | End: 2023-02-09 | Stop reason: HOSPADM

## 2023-02-09 RX ORDER — ROCURONIUM BROMIDE 10 MG/ML
INJECTION, SOLUTION INTRAVENOUS
Status: DISCONTINUED | OUTPATIENT
Start: 2023-02-09 | End: 2023-02-09

## 2023-02-09 RX ORDER — EPINEPHRINE 1 MG/ML
INJECTION, SOLUTION, CONCENTRATE INTRAVENOUS
Status: DISCONTINUED | OUTPATIENT
Start: 2023-02-09 | End: 2023-02-09 | Stop reason: HOSPADM

## 2023-02-09 RX ORDER — PHENYLEPHRINE HYDROCHLORIDE 10 MG/ML
INJECTION INTRAVENOUS
Status: DISCONTINUED | OUTPATIENT
Start: 2023-02-09 | End: 2023-02-09

## 2023-02-09 RX ORDER — LIDOCAINE HYDROCHLORIDE 20 MG/ML
INJECTION, SOLUTION EPIDURAL; INFILTRATION; INTRACAUDAL; PERINEURAL
Status: DISCONTINUED | OUTPATIENT
Start: 2023-02-09 | End: 2023-02-09

## 2023-02-09 RX ORDER — FENTANYL CITRATE 50 UG/ML
25 INJECTION, SOLUTION INTRAMUSCULAR; INTRAVENOUS EVERY 5 MIN PRN
Status: DISCONTINUED | OUTPATIENT
Start: 2023-02-09 | End: 2023-02-09 | Stop reason: HOSPADM

## 2023-02-09 RX ORDER — MIDAZOLAM HYDROCHLORIDE 1 MG/ML
INJECTION INTRAMUSCULAR; INTRAVENOUS
Status: DISCONTINUED | OUTPATIENT
Start: 2023-02-09 | End: 2023-02-09

## 2023-02-09 RX ORDER — DEXAMETHASONE SODIUM PHOSPHATE 4 MG/ML
INJECTION, SOLUTION INTRA-ARTICULAR; INTRALESIONAL; INTRAMUSCULAR; INTRAVENOUS; SOFT TISSUE
Status: DISCONTINUED | OUTPATIENT
Start: 2023-02-09 | End: 2023-02-09

## 2023-02-09 RX ORDER — SUCCINYLCHOLINE CHLORIDE 20 MG/ML
INJECTION INTRAMUSCULAR; INTRAVENOUS
Status: DISCONTINUED | OUTPATIENT
Start: 2023-02-09 | End: 2023-02-09

## 2023-02-09 RX ORDER — FENTANYL CITRATE 50 UG/ML
INJECTION, SOLUTION INTRAMUSCULAR; INTRAVENOUS
Status: DISCONTINUED | OUTPATIENT
Start: 2023-02-09 | End: 2023-02-09

## 2023-02-09 RX ORDER — SODIUM CHLORIDE 9 MG/ML
INJECTION, SOLUTION INTRAVENOUS CONTINUOUS
Status: DISCONTINUED | OUTPATIENT
Start: 2023-02-09 | End: 2023-02-09 | Stop reason: HOSPADM

## 2023-02-09 RX ORDER — TRAMADOL HYDROCHLORIDE 50 MG/1
50 TABLET ORAL
Qty: 36 TABLET | Refills: 0 | Status: SHIPPED | OUTPATIENT
Start: 2023-02-09 | End: 2023-02-22 | Stop reason: SDUPTHER

## 2023-02-09 RX ORDER — HYDROCODONE BITARTRATE AND ACETAMINOPHEN 5; 325 MG/1; MG/1
1 TABLET ORAL
Status: DISCONTINUED | OUTPATIENT
Start: 2023-02-09 | End: 2023-02-09 | Stop reason: HOSPADM

## 2023-02-09 RX ORDER — ROPIVACAINE HYDROCHLORIDE 5 MG/ML
INJECTION, SOLUTION EPIDURAL; INFILTRATION; PERINEURAL
Status: DISCONTINUED | OUTPATIENT
Start: 2023-02-09 | End: 2023-02-09

## 2023-02-09 RX ORDER — ACETAMINOPHEN 500 MG
1000 TABLET ORAL EVERY 8 HOURS PRN
Qty: 60 TABLET | Refills: 0 | Status: SHIPPED | OUTPATIENT
Start: 2023-02-09

## 2023-02-09 RX ORDER — SODIUM CHLORIDE, SODIUM LACTATE, POTASSIUM CHLORIDE, CALCIUM CHLORIDE 600; 310; 30; 20 MG/100ML; MG/100ML; MG/100ML; MG/100ML
INJECTION, SOLUTION INTRAVENOUS CONTINUOUS
Status: DISCONTINUED | OUTPATIENT
Start: 2023-02-09 | End: 2023-02-09 | Stop reason: HOSPADM

## 2023-02-09 RX ORDER — DEXMEDETOMIDINE HYDROCHLORIDE 100 UG/ML
INJECTION, SOLUTION INTRAVENOUS
Status: DISCONTINUED | OUTPATIENT
Start: 2023-02-09 | End: 2023-02-09

## 2023-02-09 RX ORDER — ASPIRIN 81 MG/1
81 TABLET ORAL 2 TIMES DAILY
Qty: 28 TABLET | Refills: 0 | Status: SHIPPED | OUTPATIENT
Start: 2023-02-09 | End: 2023-02-23

## 2023-02-09 RX ORDER — DIPHENHYDRAMINE HYDROCHLORIDE 50 MG/ML
25 INJECTION INTRAMUSCULAR; INTRAVENOUS EVERY 6 HOURS PRN
Status: DISCONTINUED | OUTPATIENT
Start: 2023-02-09 | End: 2023-02-09 | Stop reason: HOSPADM

## 2023-02-09 RX ORDER — EPINEPHRINE 1 MG/ML
INJECTION, SOLUTION, CONCENTRATE INTRAVENOUS
Status: DISCONTINUED
Start: 2023-02-09 | End: 2023-02-09 | Stop reason: HOSPADM

## 2023-02-09 RX ORDER — CEFAZOLIN SODIUM 2 G/50ML
2 SOLUTION INTRAVENOUS
Status: COMPLETED | OUTPATIENT
Start: 2023-02-09 | End: 2023-02-09

## 2023-02-09 RX ORDER — NAPROXEN 500 MG/1
500 TABLET ORAL 2 TIMES DAILY WITH MEALS
Qty: 60 TABLET | Refills: 0 | Status: SHIPPED | OUTPATIENT
Start: 2023-02-09 | End: 2023-03-11

## 2023-02-09 RX ORDER — CHLORHEXIDINE GLUCONATE ORAL RINSE 1.2 MG/ML
10 SOLUTION DENTAL
Status: DISCONTINUED | OUTPATIENT
Start: 2023-02-09 | End: 2023-02-09 | Stop reason: HOSPADM

## 2023-02-09 RX ORDER — OXYCODONE HYDROCHLORIDE 5 MG/1
5 TABLET ORAL
Qty: 28 TABLET | Refills: 0 | Status: SHIPPED | OUTPATIENT
Start: 2023-02-09 | End: 2023-02-22 | Stop reason: SDUPTHER

## 2023-02-09 RX ORDER — ACETAMINOPHEN 10 MG/ML
INJECTION, SOLUTION INTRAVENOUS
Status: DISCONTINUED | OUTPATIENT
Start: 2023-02-09 | End: 2023-02-09

## 2023-02-09 RX ADMIN — PHENYLEPHRINE HYDROCHLORIDE 100 MCG: 10 INJECTION INTRAVENOUS at 08:02

## 2023-02-09 RX ADMIN — ACETAMINOPHEN 1000 MG: 10 INJECTION, SOLUTION INTRAVENOUS at 08:02

## 2023-02-09 RX ADMIN — SUGAMMADEX 120 MG: 100 INJECTION, SOLUTION INTRAVENOUS at 09:02

## 2023-02-09 RX ADMIN — PROPOFOL 130 MG: 10 INJECTION, EMULSION INTRAVENOUS at 07:02

## 2023-02-09 RX ADMIN — FENTANYL CITRATE 50 MCG: 50 INJECTION, SOLUTION INTRAMUSCULAR; INTRAVENOUS at 07:02

## 2023-02-09 RX ADMIN — MIDAZOLAM HYDROCHLORIDE 2 MG: 1 INJECTION INTRAMUSCULAR; INTRAVENOUS at 07:02

## 2023-02-09 RX ADMIN — ONDANSETRON 4 MG: 2 INJECTION, SOLUTION INTRAMUSCULAR; INTRAVENOUS at 08:02

## 2023-02-09 RX ADMIN — SODIUM CHLORIDE, POTASSIUM CHLORIDE, SODIUM LACTATE AND CALCIUM CHLORIDE: 600; 310; 30; 20 INJECTION, SOLUTION INTRAVENOUS at 07:02

## 2023-02-09 RX ADMIN — CEFAZOLIN SODIUM 2 G: 2 SOLUTION INTRAVENOUS at 07:02

## 2023-02-09 RX ADMIN — PROPOFOL 20 MG: 10 INJECTION, EMULSION INTRAVENOUS at 09:02

## 2023-02-09 RX ADMIN — LIDOCAINE HYDROCHLORIDE 50 MG: 20 INJECTION, SOLUTION EPIDURAL; INFILTRATION; INTRACAUDAL; PERINEURAL at 07:02

## 2023-02-09 RX ADMIN — SUCCINYLCHOLINE CHLORIDE 100 MG: 20 INJECTION, SOLUTION INTRAMUSCULAR; INTRAVENOUS at 07:02

## 2023-02-09 RX ADMIN — ROPIVACAINE HYDROCHLORIDE 20 ML: 5 INJECTION, SOLUTION EPIDURAL; INFILTRATION; PERINEURAL at 07:02

## 2023-02-09 RX ADMIN — ROCURONIUM BROMIDE 25 MG: 10 INJECTION, SOLUTION INTRAVENOUS at 08:02

## 2023-02-09 RX ADMIN — DEXAMETHASONE SODIUM PHOSPHATE 8 MG: 4 INJECTION, SOLUTION INTRA-ARTICULAR; INTRALESIONAL; INTRAMUSCULAR; INTRAVENOUS; SOFT TISSUE at 08:02

## 2023-02-09 RX ADMIN — DEXMEDETOMIDINE HYDROCHLORIDE 4 MCG: 100 INJECTION, SOLUTION INTRAVENOUS at 09:02

## 2023-02-09 RX ADMIN — ROCURONIUM BROMIDE 10 MG: 10 INJECTION, SOLUTION INTRAVENOUS at 07:02

## 2023-02-09 RX ADMIN — CHLORHEXIDINE GLUCONATE 10 ML: 1.2 RINSE ORAL at 07:02

## 2023-02-09 NOTE — DISCHARGE SUMMARY
[facility]  Discharge Note  Short Stay    Procedure(s) (LRB):  REPAIR, ROTATOR CUFF, ARTHROSCOPIC (Right)      OUTCOME: Patient tolerated treatment/procedure well without complication and is now ready for discharge.    DISPOSITION: Home or Self Care    FINAL DIAGNOSIS:  right shoulder rotator cuff tear    FOLLOWUP: In clinic    DISCHARGE INSTRUCTIONS:  No discharge procedures on file.     TIME SPENT ON DISCHARGE: 10 minutes

## 2023-02-09 NOTE — H&P
[facility]  Orthopedics  H&P    Patient Name: Radha Ramachandran  MRN: [id number]  Admission Date: 2/9/2023  Primary Care Provider: Hebert Oconnor Jr, MD    Patient information was obtained from patient.     Subjective:     Principal Problem: Nontraumatic complete tear of right rotator cuff, biceps tendinitis of right UE, OA of glenohumeral joint, subacromial impingement of right shoulder, arthritis of right AC joint    Chief Complaint: Right shoulder pain     HPI: Radha Ramachandran is a 57 y.o. female who is here today for surgery with Dr. Chau,  right shoulder arthroscopy with rotator cuff repair and subacromial decompression. She was last seen by Dr. Chau on 11/4/22, she reports no change in history since that visit.     To review her history, she has known lright shoulder long head biceps tendon rupture and suspected rotator cuff tear. Onset of her symptoms was after she sustained a fall in January 2022. We previously ordered MRI of the right shoulder that showed right shoulder rotator cuff tear including high grade subscapularis tear glenohumeral arthritis, and AC joint arthritis with enthesophyte and subsequent subacromial impingement. She has clinical evidence of long head biceps tendon rupture as well. Her pain is aggravated by movement especially above shoulder height, sleep, and turning her hands with palms up. She has tried treatment in the form of rest, activity modification, mobic, biofreeze, icy hot. She is ready to proceed with surgery at this time.      Past Medical History:   Diagnosis Date    COPD with asthma     Digestive disorder     Fibromyalgia     H/O psoriatic arthritis     Hx of migraines     Post-traumatic osteoarthritis of left shoulder 12/15/2020    Rheumatoid arthritis        Past Surgical History:   Procedure Laterality Date    ANKLE FUSION Right     APPENDECTOMY      ARTHROPLASTY OF SHOULDER Left 1/25/2021    Procedure: ARTHROPLASTY, SHOULDER;  Surgeon:  "Ramesh Chau MD;  Location: [facility];  Service: Orthopedics;  Laterality: Left;    CHOLECYSTECTOMY      EYELID LACERATION REPAIR Left     HYSTERECTOMY      OPEN REDUCTION AND INTERNAL FIXATION (ORIF) OF INJURY OF HAND  right    ORIF TIBIA & FIBULA FRACTURES Right        Review of patient's allergies indicates:   Allergen Reactions    Latex, natural rubber Hives     Pt "forgot' to report allergy until rash noted on L forearm.         Current Facility-Administered Medications   Medication    0.9%  NaCl infusion    cefazolin (ANCEF) 2 gram in dextrose 5% 50 mL IVPB (premix)    chlorhexidine 0.12 % solution 10 mL     Facility-Administered Medications Ordered in Other Encounters   Medication    lactated ringers infusion     Family History       Problem Relation (Age of Onset)    Asthma Maternal Grandmother    Coronary artery disease Sister    Diabetes Mother    Gout Maternal Grandfather    Heart failure Mother    Hypertension Sister    Pacemaker/defibrilator Mother    Schizophrenia Sister    Stroke Mother, Maternal Grandmother          Tobacco Use    Smoking status: Every Day     Packs/day: 0.50     Types: Cigarettes    Smokeless tobacco: Never    Tobacco comments:     Started smoking at 15 y/o was up to 2 ppd in 2014 ; started cutting renzo to 1 ppd now down to < 1 ppd    Substance and Sexual Activity    Alcohol use: Not Currently    Drug use: Never    Sexual activity: Not Currently     Partners: Male     ROS  Objective:     Vital Signs (Most Recent):    Vital Signs (24h Range):              There is no height or weight on file to calculate BMI.    No intake or output data in the 24 hours ending 02/09/23 0634    Ortho/SPM Exam  Physical Exam  Patient is alert and oriented, no distress. Skin is intact. Neuro is normal with no focal motor or sensory findings.     Cervical exam is unremarkable. Intact cervical ROM. Negative Spurling's test     Physical Exam:                       RIGHT                              "        LEFT     Scap Dyskinesis/Winging       (-)                                             (-)     Tenderness:                                                                              Greater Tuberosity                  +                                              (-)  Bicipital Groove                       +                                              (-)  AC joint                                   (-)                                             (-)  Other:      ROM:  Forward Elevation       180                                          180  Abduction                    120                                          120  ER (at side)                 80                                            80  IR                                 T8                                            T8     Strength:   Supraspinatus             4/5                                           5/5  Infraspinatus               4+/5                                         5/5  Subscap / IR               4/5                                           5/5      Special Tests:              Neer:                                       +                                            (-)              Miller:                                 (-)                                            (-)              SS Stress:                               +                                              (-)              Bear Hug:                                (-)                                             (-)              Craig's:                                 +                                              (-)              Resisted Thrower's:                +                                              (-)              Cross Arm Abduction:             (-)                                             (-)     Clinical evidence of right long head biceps tendon rupture     Neurovascular examination  - Motor grossly intact bilaterally to shoulder abduction,  elbow flexion and extension, wrist flexion and extension, and intrinsic hand musculature  - Sensation intact to light touch bilaterally in axillary, median, radial, and ulnar distributions  - Symmetrical radial pulses    Significant Labs: All pertinent labs within the past 24 hours have been reviewed.    Significant Imaging: I have reviewed and interpreted all pertinent imaging results/findings.    X-ray Shoulder 2 or More Views Right     Narrative  EXAMINATION:  XR SHOULDER COMPLETE 2 OR MORE VIEWS RIGHT     CLINICAL HISTORY:  Pain in right shoulder     TECHNIQUE:  Two or three views of the right shoulder were performed.     COMPARISON:  None     FINDINGS:  Mild glenohumeral joint degenerative findings present including osteophyte formation and inferior joint space loss.  AC joint intact.  No acute osseous abnormality.  Lung parenchyma clear.  Lower cervical spine degenerative findings.     Impression  As above        Electronically signed by:     Price Rose MD  Date:                                                05/06/2022  Time:                                               08:37     MRI Results:  Results for orders placed during the hospital encounter of 11/01/22     MRI Shoulder Without Contrast Right     Narrative  EXAMINATION:  MRI SHOULDER WITHOUT CONTRAST RIGHT     CLINICAL HISTORY:  rule out tear;  Primary osteoarthritis, right shoulder     TECHNIQUE:  Multiplanar multislice images are were performed through the right shoulder.     COMPARISON:  None     FINDINGS:  Moderate to high-grade partial-thickness tearing of the supraspinatus tendon at the footprint.  High-grade subscapularis tendon with medially subluxed and partially torn long head of the biceps tendon.  Low-grade infraspinatus tendon tearing.  The teres minor tendon is unremarkable.     Glenohumeral osteoarthritis with anterior superior and superior labral tearing.     Acromioclavicular osteoarthritis with subacromial enthesophyte.     No  acute stress or traumatic fracture.     Impression  High-grade subscapularis tendon tear with medially subluxed and partially torn long head of the biceps tendon.     Moderate grade supraspinatus tendon tearing.     Glenohumeral osteoarthritis with labral tear.     Acromioclavicular osteoarthritis with subacromial enthesophyte.        Electronically signed by:     Humphrey Stephens  Date:                                                11/01/2022  Time:                                               17:56    Assessment/Plan:     There are no hospital problems to display for this patient.    Plan:  Right shoulder arthroscopy with rotator cuff repair and subacromial decompression.      Roselia Monsalve PA-C  Orthopedics  [facility]

## 2023-02-09 NOTE — PLAN OF CARE
Reviewed and completed all discharge orders. Printed AVS and educated patient and family member of its entirety, including physician's orders, follow-up appt, medications, when to call, and when to report to the emergency room. Reviewed prescriptions, pharmacy information, and made sure there were no conflicts preventing the patient from obtaining the newly prescribed medications. I encouraged questions, answered them thoroughly, and evaluated my instructions via teach-back method. Patient has met all hospital discharge criteria at this point. Patient wheeled to car by RN.

## 2023-02-09 NOTE — ANESTHESIA PROCEDURE NOTES
Peripheral Block    Patient location during procedure: pre-op   Block not for primary anesthetic.  Reason for block: at surgeon's request and post-op pain management   Post-op Pain Location: Right shoulder   Start time: 2/9/2023 7:37 AM  Timeout: 2/9/2023 7:31 AM   End time: 2/9/2023 7:39 AM    Staffing  Authorizing Provider: Caprice iYn MD  Performing Provider: Caprice Yin MD    Preanesthetic Checklist  Completed: patient identified, IV checked, site marked, risks and benefits discussed, surgical consent, monitors and equipment checked, pre-op evaluation and timeout performed  Peripheral Block  Patient position: supine  Prep: ChloraPrep  Patient monitoring: heart rate, cardiac monitor, continuous pulse ox, continuous capnometry and frequent blood pressure checks  Block type: supraclavicular  Laterality: right  Injection technique: single shot  Needle  Needle type: Stimuplex   Needle gauge: 22 G  Needle length: 2 in  Needle localization: anatomical landmarks, ultrasound guidance and nerve stimulator   -ultrasound image captured on disc.  Assessment  Injection assessment: negative aspiration, negative parasthesia and local visualized surrounding nerve  Paresthesia pain: none  Heart rate change: no  Slow fractionated injection: yes  Pain Tolerance: comfortable throughout block and no complaints  Medications:    Medications: ropivacaine (NAROPIN) injection 0.5% - Perineural   20 mL - 2/9/2023 7:39:00 AM    Additional Notes  VSS.  [facility] RN monitoring vitals throughout procedure.  Patient tolerated procedure well.

## 2023-02-09 NOTE — TRANSFER OF CARE
"Anesthesia Transfer of Care Note    Patient: Radha Ramachandran    Procedure(s) Performed: Procedure(s) (LRB):  REPAIR, ROTATOR CUFF, ARTHROSCOPIC (Right)    Patient location: PACU    Anesthesia Type: general and regional    Transport from OR: Transported from OR on room air with adequate spontaneous ventilation    Post pain: adequate analgesia    Post assessment: no apparent anesthetic complications    Post vital signs: stable    Level of consciousness: awake    Nausea/Vomiting: no nausea/vomiting    Complications: none    Transfer of care protocol was followed      Last vitals:   Visit Vitals  /71 (BP Location: Left arm, Patient Position: Lying)   Pulse 69   Temp 36.3 °C (97.4 °F) (Temporal)   Resp 16   Ht 5' 5" (1.651 m)   Wt 62 kg (136 lb 11 oz)   SpO2 100%   Breastfeeding No   BMI 22.75 kg/m²     "

## 2023-02-09 NOTE — OP NOTE
ORTHOPAEDIC SURGERY OPERATIVE REPORT    DATE OF SERVICE: 2/9/2023    PRIMARY SURGEON: Ramesh Chau MD    Assistant Surgeon: Roselia Monsalve PA-C. Skilled assistance was medically necessary for this case to help with extremity positioning, soft tissue retractions and instrumentation.     DATE OF SURGERY: 2/9/2023    PATIENT'S NAME: Radha Ramachandran    MEDICAL RECORD NUMBER: [id number]     PREOPERATIVE DIAGNOSES:   1. Right shoulder rotator cuff tear  2. Right shoulder biceps tendinitis, SLAP tear    POSTOPERATIVE DIAGNOSES:   1. Right shoulder rotator cuff tear, including subscapularis  2. Right shoulder long head biceps tendon tear    PROCEDURE PERFORMED:   1. Right shoulder arthroscopic rotator cuff repair, including subscapularis  2. Right shoulder limited debridement    ANESTHESIA: General plus regional.     IMPLANTS USED:   Implant Name Type Inv. Item Serial No.  Lot No. LRB No. Used Action   SYS SWIVELOCK LNT 4.75BC - [id number]  SYS SWIVELOCK LNT 4.75BC  [employer] [id number] Right 1 Implanted   ANCHOR FIBERTAK 2.6 SOFT DL - [id number]  ANCHOR FIBERTAK 2.6 SOFT DL  [employer] [id number] Right 2 Implanted   ANCHOR SWIVELOCK KNTLS BLUE #2 - [id number]  ANCHOR SWIVELOCK KNTLS BLUE #2  [employer] [id number] Right 2 Implanted         COMPLICATIONS: None.     POSITION: Beachchair.     BRIEF INDICATIONS: This is a 57 y.o. female who presents with a symptomatic RIGHT rotator cuff tear and associated biceps tendonopathy. she has failed conservative treatment measures. We discussed surgical treatment options including risks and benefits. After a detailed explanation of the technical aspects of the procedure, the patient elected to proceed and signed consent.    OPERATIVE FINDINGS:   Biceps/Labrum: Ruptured long head biceps, Type 2 SLAP tear  Glenohumeral joint: Glenoid and humeral head with grade 2-3 changes  Rotator Cuff: Full thickness tear of the supraspinatus, upper border tear of subscapularis  Subacromial  space: inflamed bursal tissue without anterolateral spur    DESCRIPTION OF PROCEDURE:   The patient was identified in the preoperative holding area. The operative upper extremity was marked.  Consent was verified. The patient was then taken for preoperative block. Following this, the patient was taken to the main operating room where she was laid supine on the operative table. General anesthetic was induced. Preoperative time-out was performed verifying the patient, procedure, and preoperative antibiotics. The patient was then positioned in the beachchair position with all bony prominences well padded. The operative upper extremity was then prepped and draped in the usual sterile fashion.     A posterior portal was established with an #11-blade. The arthroscope was inserted into the glenohumeral joint atraumatically. We then made an anterior portal using an outside-in technique with an #18-gauge spinal needle into the rotator interval. We then used an #11-blade for stab incision and then followed this with a straight hemostat to open our anterior portal. We then inserted our arthroscopic probe to probe the joint. We were able to visualize the biceps tendon stump as it had previously ruptured. The labrum was probed and demonstrated frayed edges and a type 2 SLAP tear. The shaver was introduced to debride the frayed edges of labrum.    The subscapularis was viewed and probed and found to have a tear at the superior 1/3 of its insertion on the lesser tuberosity. A shaver and vapr were used to release adhesions around the tendon and the shaver was used to prepare the exposed bone on the lesser tuberosity. A passport cannula was inserted into the anterior portal. A scorpion device was then used to pass a suture tape through the superior aspect of the subscapularis tendon and looped in a luggage tag stitch. An awl was then introduced and a anchor site chosen in the lesser tuberosity footprint. The awl was advanced, the  suture was loaded into a 4.75 swivelock anchor and the anchor was then put into position to re-tension the subscapularis.     From the glenohumeral space we were also able to visualize a full thickness rotator cuff tear.  We then localized a lateral portal under direct visualization with an #18-gauge spinal needle into the cuff tear. We then made a #11-blade stab incision in the lateral shoulder and then through this brought our arthroscopic shaver. We debrided the torn cuff tissue back to a stable rim and also began preparing the tuberosity for anchor placement. We then removed our instruments from the glenohumeral joint and placed the arthroscope from the posterior portal into the subacromial space. We debrided a significant amount of bursal tissue in the subacromial space. We identified the undersurface of the acromion. We used a VAPR to debride the undersurface of the acromion up to the anterior and lateral margins. We identified the CA ligament and we were careful not to remove this. We continued debridement of the bursal tissue, identified the rotator cuff tear, and worked to define the edges more clearly. The remnants of rotator cuff tissue at the greater tuberosity was debrided and the greater tuberosity preparation was completed by debriding down to bleeding bone.     Once the rotator cuff was prepared, we then placed our medial row anchors.  We used 2 Arthrex 2.6mm FiberTak anchors preloaded with SutureTape for the medial row.  We then passed the sutures using the scorpion device from anterior to posterior.  We tied these in a mattress fashion using alternating half-hitches. The tails were left long for incorporation into a lateral row.    Next, we began debridement of the lateral humerus with a VAPR probe. We debrided down to bony base. We then placed our lateral row anchors, which consisted of 2 Arthrex 4.75mm SwiveLock anchors.  We brought sutures from each of the medial row anchors and tensioned them  into the anterior lateral row anchor and then advanced the anchor.  This was repeated for the posterior lateral row anchor. Once we completed this, we took the remaining final arthroscopic pictures.     The portal sites were closed with 3-0 nylon sutures.  A light sterile dressing and sling shoulder immobilizer were applied.  The patient was then woken from anesthesia and brought to PACU in stable condition.    Plan:  Rotator Cuff Protocol  NWB RUE in sling  Ok to be out of sling for pendulums, elbow, wrist ROM  ASA 81mg BID x 2wks for DVT ppx  f/u 10-14 days for suture removal      Ramesh Chau MD

## 2023-02-09 NOTE — ANESTHESIA POSTPROCEDURE EVALUATION
Anesthesia Post Evaluation    Patient: Radha Ramachandran    Procedure(s) Performed: Procedure(s) (LRB):  REPAIR, ROTATOR CUFF, ARTHROSCOPIC (Right)    Final Anesthesia Type: general      Patient location during evaluation: PACU  Patient participation: Yes- Able to Participate  Level of consciousness: awake and alert and oriented  Post-procedure vital signs: reviewed and stable  Pain management: adequate  Airway patency: patent    PONV status at discharge: No PONV  Anesthetic complications: no      Cardiovascular status: blood pressure returned to baseline, stable and hemodynamically stable  Respiratory status: unassisted  Hydration status: euvolemic  Follow-up not needed.          Vitals Value Taken Time   /56 02/09/23 1001   Temp 36.5 °C (97.7 °F) 02/09/23 0916   Pulse 69 02/09/23 1001   Resp 28 02/09/23 1001   SpO2 94 % 02/09/23 1000   Vitals shown include unvalidated device data.      Event Time   Out of Recovery 09:52:00         Pain/Wale Score: Wale Score: 9 (2/9/2023 10:00 AM)

## 2023-02-09 NOTE — PATIENT INSTRUCTIONS
DISCHARGE INSTRUCTIONS FOR ROTATOR CUFF REPAIR     Contact the [facility] at [phone] if you have questions about your instructions or follow-up appointment.     DIET:   Start with clear liquids and light foods to minimize nausea. Once these are tolerated, advance to a regular diet.     DRESSING AND WOUND CARE:   Keep the dressing clean and dry. It is normal for there to be some drainage after surgery since the shoulder was irrigated with large amounts of fluid. Reinforce with additional gauze as necessary.   Remove the dressing the 2nd day after surgery and begin changing daily with clean gauze or Band-Aids®. Keep your incisions covered until you follow up in clinic.   If you have Steri-Strips in place of stitches, allow them to stay in place as long as possible. Steri-Strips are made of a fabric material that can get wet in the shower and pat dry with a towel. They usually fall off on their own within 7 to 10 days. You may trim the edges as they begin to curl.     BATHING:   You may bathe or shower on the 2nd day after surgery, but do not scrub or soak the incisions. Dry the area by gently blotting it with a gauze or towel. After it is completely dry, cover the wound with clean gauze or Band-Aids®. Do NOT submerge the incisions (bath/swim) until after the sutures are removed and the wound has completely healed.     ACTIVITY:    Ice should be applied to the shoulder for 20-30 minutes, 5-6 times a day, to help control pain and swelling. Apply additional times as needed, especially after exercise, for the first 3-4 weeks. Do not apply ice directly to the skin; use a thin barrier in between. Also, do not use heat.    Elevate the shoulder by sleeping as upright as possible using extra pillows or a recliner. Do this for the first few days to help decrease pain and swelling.    Wear the sling at all times for 6 weeks including while sleeping. The only time you may remove the sling is for bathing and  exercises. Do not lean or put your body weight on your arm.    When your block wears off, start the following exercises:  Remove the sling for 5-10 minutes, 3 times a day, to do the following exercises:   Fully bend & straighten your fingers, your wrist, and your elbow several times.   Lean forward, bracing yourself on a table/counter with your normal arm. Let your surgical arm relax and hang straight down. Shift your weight so that your arm moves side to side, front to back, and in gentle circles like a pendulum or elephant's trunk. Use your body to generate the movement for this, NOT your surgical shoulder's muscles. (see drawing below)      Physical therapy will be started after your 1st follow-up visit. At that time, you will be given a prescription & rehabilitation protocol to take to the PT clinic of your choice. Plan to visit with a therapist within 3 days after your follow-up visit.     PAIN CONTROL:   It is important to stay ahead of pain as it becomes challenging to get under control if you fall behind. Ice and elevation can help and should be used as much as possible in the first few days.    Narcotic pain medications, such as tramadol and oxycodone, should be taken as prescribed. The Tramadol is intended to be taken first as the primary medicine and then oxycodone taken for breakthrough pain. Wean off as soon as possible. Take these with food to decrease the chances of nausea and vomiting. Do not drink alcohol, drive a vehicle, or use heavy machinery while taking narcotic pain medications.    NSAID medications are used for pain control and to decrease inflammation. You may be prescribed an NSAID such as celebrex. Take as instructed. Other NSAID medications such as ibuprofen, Motrin, Advil, naproxen, or Aleve can be used once you have finished the celebrex, or if a prescription for celebrex was not provided.    Acetaminophen (Tylenol) is an effective over-the-counter pain medication that can be used with  NSAID medications and non-acetaminophen containing narcotics such as plain oxycodone.     ASPIRIN FOR PREVENTION OF BLOOD CLOTS:   You should take one 81 mg baby aspirin twice daily for two weeks starting the evening of the day you have surgery unless instructed otherwise or taking a different blood thinner such as enoxaparin or warfarin. If you are aware that you are at high risk for a blood clot, notify your physician as soon as possible.   Take aspirin at least 30 minutes before taking ibuprofen or Toradol.    CONSTIPATION PREVENTION:   Anesthesia and pain medications, changes in eating and drinking, and less activity can all lead to constipation after surgery. To prevent or reduce constipation, take an over-the-counter stool softener (brands include Colace and Miralax). Follow the directions on the bottle. Drink plenty of water and eat high fiber foods including whole grains, fresh fruits, vegetables, beans, prunes or prune juice.     PROBLEMS TO REPORT:   Persistent bloody drainage that soaks through reinforced dressings.   Fever greater than 101F or 38C.   Incision that is very red, swollen, draining pus, shows red streaks, or feels hot.   Inability to urinate within 8 hours of surgery (a rare effect of the anesthesia).   If you develop a rash, generalized itching or swelling from the medications, STOP the medication and call the clinic or the orthopedic surgery resident on call.   Daytime calls should be directed to the [facility] Medicine Clinic at [phone].   Night-time and weekend calls should be directed to the after hours nurse line at [phone]    FREQUENTLY ASKED QUESTIONS     WHAT DAILY ACTIVITIES CAN I DO?   After shoulder surgery, you may do what you feel comfortable doing in the sling. Do not lift anything with your operative arm or put yourself at risk of falling.     CAN I DRIVE OR RIDE BY CAR/ TRAIN/ PLANE?   You should not drive while using a sling. There are no forced restrictions  regarding operating a motor vehicle, however you must always be the  of whether you are able to operate it safely. You should not drive while taking narcotic pain medications. You may ride in a car after surgery as needed. You may take a train or even fly the day after your surgery as long as you feel secure and comfortable.     WHAT ABOUT WORK?   You may return to an office-type job or to school whenever comfortable. For most patients this occurs 1-2 weeks after surgery. For more active jobs that require some lifting, you can wait until after your follow-up appointment. Any other unusual types of jobs should be discussed to determine a date for return to work.     WHAT ABOUT SWELLING?   Expect swelling as a normal process after surgery. Ice, elevation, and other treatments provided at physical therapy will allow this to improve in time. Some swelling may remain for up to 8 weeks, and this is normal.     WHAT IF IT REALLY HURTS TOO MUCH?   Surgery hurts and you cannot expect to be pain free, but our goal is for it to be tolerable. Try to use all available pain therapies such as narcotics, NSAIDS, and acetaminophen. Always try more ice and elevation. If the pain is not tolerable, call the clinic or the after hours nurse line.

## 2023-02-09 NOTE — ANESTHESIA PROCEDURE NOTES
Intubation    Date/Time: 2/9/2023 7:54 AM  Performed by: Dona Mckeon CRNA  Authorized by: Caprice Yin MD     Intubation:     Induction:  Intravenous    Intubated:  Postinduction    Mask Ventilation:  Easy mask    Attempts:  1    Attempted By:  CRNA    Method of Intubation:  Direct    Blade:  Du 2    Laryngeal View Grade: Grade I - full view of cords      Difficult Airway Encountered?: No      Complications:  None    Airway Device:  Oral endotracheal tube    Airway Device Size:  7.0    Style/Cuff Inflation:  Cuffed (inflated to minimal occlusive pressure)    Inflation Amount (mL):  7    Tube secured:  20    Secured at:  The lips    Placement Verified By:  Capnometry    Complicating Factors:  None    Findings Post-Intubation:  BS equal bilateral and atraumatic/condition of teeth unchanged

## 2023-02-09 NOTE — ADDENDUM NOTE
Addendum  created 02/09/23 1235 by Caprice Yin MD    Child order released for a procedure order, Clinical Note Signed, Intraprocedure Blocks edited, SmartForm saved

## 2023-02-10 DIAGNOSIS — G89.29 CHRONIC RIGHT-SIDED LOW BACK PAIN WITH RIGHT-SIDED SCIATICA: Primary | ICD-10-CM

## 2023-02-10 DIAGNOSIS — M54.41 CHRONIC RIGHT-SIDED LOW BACK PAIN WITH RIGHT-SIDED SCIATICA: Primary | ICD-10-CM

## 2023-02-20 ENCOUNTER — CLINICAL SUPPORT (OUTPATIENT)
Dept: REHABILITATION | Facility: HOSPITAL | Age: 58
End: 2023-02-20
Attending: STUDENT IN AN ORGANIZED HEALTH CARE EDUCATION/TRAINING PROGRAM
Payer: MEDICAID

## 2023-02-20 DIAGNOSIS — Z98.890 S/P RIGHT ROTATOR CUFF REPAIR: ICD-10-CM

## 2023-02-20 DIAGNOSIS — M25.511 CHRONIC RIGHT SHOULDER PAIN: ICD-10-CM

## 2023-02-20 DIAGNOSIS — G89.29 CHRONIC RIGHT SHOULDER PAIN: ICD-10-CM

## 2023-02-20 PROCEDURE — 97161 PT EVAL LOW COMPLEX 20 MIN: CPT | Mod: PO

## 2023-02-20 PROCEDURE — 97110 THERAPEUTIC EXERCISES: CPT | Mod: PO

## 2023-02-20 NOTE — PLAN OF CARE
[facility]  Physical Therapy Initial Evaluation    Name: Radha Ramachandran  Clinic Number: [id number]    Therapy Diagnosis:   Encounter Diagnoses   Name Primary?    S/P right rotator cuff repair     Chronic right shoulder pain      Physician: Ramesh Chau    Physician Orders: PT Eval and Treat  Medical Diagnosis from Referral: S/P right rotator cuff repair [Z98.890]  Evaluation Date: 2/20/2023  Authorization Period Expiration: 1/25/2023 - 1/25/2024  Plan of Care Expiration: 8-7-2023  Visit # / Visits authorized: 1/ 1    Time In: 1500  Time Out: 1600  Total Billable Time: 60 minutes    Precautions: Standard    Subjective   Date of onset: DOS 2-9-23  History of current condition - Radha reports: that she had hurt her shoulder >6 months ago and continued to work but finally got surgery to repair her Right RTC. 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 She denies radicular sx.s at this time. Would like to get back to work and ADLs.        Past Medical History:   Diagnosis Date    COPD with asthma     Digestive disorder     Fibromyalgia     H/O psoriatic arthritis     Hx of migraines     Post-traumatic osteoarthritis of left shoulder 12/15/2020    Rheumatoid arthritis      Radha Ramachandran  has a past surgical history that includes Eyelid laceration repair (Left); Hysterectomy; Ankle Fusion (Right); Appendectomy; Cholecystectomy; Open reduction and internal fixation (ORIF) of injury of hand (right); ORIF tibia & fibula fractures (Right); Arthroplasty of shoulder (Left, 1/25/2021); Arthroscopic repair of rotator cuff of shoulder (Right, 2/9/2023); and Arthroscopic tenotomy of biceps tendon (Right, 2/9/2023).    Radha has a current medication list which includes the following prescription(s): acetaminophen, albuterol, albuterol, aspirin, cefdinir, diclofenac sodium, escitalopram oxalate, famotidine, fluticasone  "propionate, levocetirizine, lidocaine-prilocaine, meloxicam, methocarbamol, naproxen, nitrofurantoin (macrocrystal-monohydrate), ondansetron, oxycodone, spiriva with handihaler, and tramadol, and the following Facility-Administered Medications: lactated ringers, methacholine chloride, methacholine chloride, methacholine chloride, methacholine chloride, methacholine chloride, and methacholine chloride.    Review of patient's allergies indicates:   Allergen Reactions    Latex, natural rubber Hives     Pt "forgot' to report allergy until rash noted on L forearm.         Prior Therapy: yes  Social History:  lives with their family  Prior Level of Function: Pt able to lift 10-30# OH, push push 20-30#, and reach in all directions  Current Level of Function: Pt is restricted with surgical precautions     Pain:  Current 6/10, worst 8/10, best 4/10   Location: right shoulder   Description: Aching and Dull      Objective   Posture: pt in shoulder sling with abduction pillow    Passive Range of Motion:   Shoulder Right  Left    Flexion 50 135   Abduction 80 140   ER  15 75   IR 30 25      Active Range of Motion:   Shoulder Right Left   Flexion  135   Abduction  140     Upper Extremity Strength:  Formal MMT not performed 2/2 POD# and increased pain.      Joint Mobility:  as expected post-op.    Palpation:   as expected post-op.    Sensation: Intact but diminished 2/2 residual nerve block.          CMS Impairment/Limitation/Restriction for FOTO Shoulder Survey    Therapist reviewed FOTO scores for Radha Ramachandran on 2/20/2023.   FOTO documents entered into [employer] - see Media section.    Limitation Score: 32%  Category: Mobility         TREATMENT     Total Treatment time separate from Evaluation: 40 minutes    Radha received therapeutic exercises for 31 minutes including:  Pt edu on HEP, POC, PT eval findings, biomechanics, precautions, safety with ADL   Elbow ext /flexion AROM  Wrist sup/pro  Wrist flex/ext     Radha received the " following manual therapy techniques:  for 9 minutes, including:  PROM ABD/flexion as tolerated in precautions  PROM IR /ER within precautions  ER/IR isos sub max       Home Exercises and Patient Education Provided    Education provided re: precautions, PT eval findings, biomechanics, safety with ADLs, form with activities, POC, goals for therapy, role of therapy for care, exercises/HEP    Written Home Exercises Provided: yes  Exercises were reviewed and Radha was able to demonstrate them prior to the end of the session.   Pt received a written copy of exercises to perform at home. Radha demonstrated good understanding of the education provided.     See EMR under patient instructions for exercises given.   Assessment   Radha is a 57 y.o. female referred to outpatient Physical Therapy with a medical diagnosis of S/P right rotator cuff repair [Z98.890]. Pt presents with s/p 10 days right medium RTC-r. Pt has deficits in dorm, decreased strength and endurance, decrease neuro-muscular control and increased pain . This limits the pts ability to lift, reach, don doff clothes, complete work duties . Pt is a good candidate for skilled physical therapy treatment to address the above limitations through manual therapy techniques, neuro-reeducation, therapeutic exercise, therapeutic activity, patient education, strength and endurance program.     Pt prognosis is Excellent.   Pt will benefit from skilled outpatient Physical Therapy to address the deficits stated above and in the chart below, provide pt/family education, and to maximize pt's level of independence.     Plan of care discussed with patient: yes  Pt's spiritual, cultural and educational needs considered and patient is agreeable to the plan of care and goals as stated below:     Anticipated Barriers for therapy: none    Medical Necessity is demonstrated by the following  History  Co-morbidities and personal factors that may impact the plan of care Co-morbidities:    none    Personal Factors:   no deficits     low   Examination  Body Structures and Functions, activity limitations and participation restrictions that may impact the plan of care Body Regions:   upper extremities    Body Systems:    gross symmetry  ROM  strength  gross coordinated movement  balance  gait  transfers  transitions  motor control  motor learning    Participation Restrictions:   none    Activity limitations:   Learning and applying knowledge  no deficits    General Tasks and Commands  no deficits    Communication  no deficits    Mobility  lifting and carrying objects  fine hand use (grasping/picking up)    Self care  no deficits    Domestic Life  no deficits    Interactions/Relationships  no deficits    Life Areas  no deficits    Community and Social Life  no deficits         low   Clinical Presentation stable and uncomplicated low   Decision Making/ Complexity Score: low     Goals:  Short Term Goals: 2-12 weeks   Pt will be able to demonstrate >110 deg of PROM flexion and ABD.  Pt will be able to demonstrate > 45 deg PROM ER.  Pt will be able to demonstrate full PROM of RUE..  Pt will be able to demonstrate IR and ER Yellow Theraband x10 no pain.  Pt will be able to demonstrate AAROM table slide x20.  Pt will be able to demonstrate lift 1# to 120 deg AROM flexion.    Long Term Goals: 12-24 weeks   Pt will be able to demonstrate full AROM or RUE.  Pt will be able to demonstrate push pull 30# sled.  Pt will be able to demonstrate lift 5# OH single arm  Pt will be able to demonstrate 30# OH double arm  Pt will subjectively report satisfactory completion of all ADLs, recreation, and work duties.       Plan   Plan of care Certification: 2/20/2023 to 8-7-23.    Outpatient Physical Therapy 2 times weekly for 24 weeks to include the following interventions: Electrical Stimulation, Manual Therapy, Moist Heat/ Ice, Neuromuscular Re-ed, Patient Education, Self Care, Therapeutic Activities, and Therapeutic  Exercise    Angle Bailey, PT, DPT

## 2023-02-21 NOTE — PROGRESS NOTES
Orthopaedics  Post-operative follow-up    Procedure Performed:   1. Right shoulder arthroscopic rotator cuff repair, including subscapularis  2. Right shoulder limited debridement    Date of Surgery: 2/9/23    Subjective: Radha Ramachandran is now almost 2 weeks out from her shoulder surgery.  She is doing well with no specific complaints other than the expected post-operative pain and stiffness.  She has been compliant with post-operative restrictions.  She has gotten started with physical therapy at [facility].       Exam:  Sutures removed, incision sites benign with no drainage or redness  Mild bruising as anticipated  ROM fluid, will be formally assessed at next visit  Axillary nerve sensation and motor intact  Motor and sensory intact distally  Strong radial pulse, fingers warm and well perfused    Imaging:  No new imaging.     Impression:  S/p right shoulder arthroscopic rotator cuff repair, including subscapularis, and limited debridement, initial post-operative visit - doing well    Plan:  Discussed surgical findings, operative procedure  Reviewed post-operative instructions, restrictions, and rehabilitation  Provided PT script and protocol  Symptomatic treatment for pain / swelling  Instructed patient to call clinic if questions or concerns    Refill for Tramadol provided today.     Follow-up in 1 month at 6 weeks post-op    Work status:  For weeks 0-4 from now:  1) Sling immobilization at all times, one armed work (other than typing)  2) Allow time for physical therapy    For weeks 4-8 from now:  1) Discontinue sling  2) Continue physical therapy  3) No lifting/pushing/pulling greater than 2 pounds  4) No overhead work  5) No repetitive motions        Ramesh Chau MD    I, Rob Fan, acted as a scribe for Ramesh Chau MD for the duration of this office visit.

## 2023-02-22 ENCOUNTER — OFFICE VISIT (OUTPATIENT)
Dept: ORTHOPEDICS | Facility: CLINIC | Age: 58
End: 2023-02-22
Payer: MEDICAID

## 2023-02-22 ENCOUNTER — PATIENT MESSAGE (OUTPATIENT)
Dept: ORTHOPEDICS | Facility: CLINIC | Age: 58
End: 2023-02-22

## 2023-02-22 VITALS — WEIGHT: 136 LBS | HEIGHT: 65 IN | BODY MASS INDEX: 22.66 KG/M2

## 2023-02-22 DIAGNOSIS — Z98.890 S/P RIGHT ROTATOR CUFF REPAIR: Primary | ICD-10-CM

## 2023-02-22 PROCEDURE — 1160F PR REVIEW ALL MEDS BY PRESCRIBER/CLIN PHARMACIST DOCUMENTED: ICD-10-PCS | Mod: CPTII,,, | Performed by: STUDENT IN AN ORGANIZED HEALTH CARE EDUCATION/TRAINING PROGRAM

## 2023-02-22 PROCEDURE — 1160F RVW MEDS BY RX/DR IN RCRD: CPT | Mod: CPTII,,, | Performed by: STUDENT IN AN ORGANIZED HEALTH CARE EDUCATION/TRAINING PROGRAM

## 2023-02-22 PROCEDURE — 3008F PR BODY MASS INDEX (BMI) DOCUMENTED: ICD-10-PCS | Mod: CPTII,,, | Performed by: STUDENT IN AN ORGANIZED HEALTH CARE EDUCATION/TRAINING PROGRAM

## 2023-02-22 PROCEDURE — 99214 OFFICE O/P EST MOD 30 MIN: CPT | Mod: PBBFAC | Performed by: STUDENT IN AN ORGANIZED HEALTH CARE EDUCATION/TRAINING PROGRAM

## 2023-02-22 PROCEDURE — 99024 POSTOP FOLLOW-UP VISIT: CPT | Mod: ,,, | Performed by: STUDENT IN AN ORGANIZED HEALTH CARE EDUCATION/TRAINING PROGRAM

## 2023-02-22 PROCEDURE — 99024 PR POST-OP FOLLOW-UP VISIT: ICD-10-PCS | Mod: ,,, | Performed by: STUDENT IN AN ORGANIZED HEALTH CARE EDUCATION/TRAINING PROGRAM

## 2023-02-22 PROCEDURE — 1159F PR MEDICATION LIST DOCUMENTED IN MEDICAL RECORD: ICD-10-PCS | Mod: CPTII,,, | Performed by: STUDENT IN AN ORGANIZED HEALTH CARE EDUCATION/TRAINING PROGRAM

## 2023-02-22 PROCEDURE — 1159F MED LIST DOCD IN RCRD: CPT | Mod: CPTII,,, | Performed by: STUDENT IN AN ORGANIZED HEALTH CARE EDUCATION/TRAINING PROGRAM

## 2023-02-22 PROCEDURE — 3008F BODY MASS INDEX DOCD: CPT | Mod: CPTII,,, | Performed by: STUDENT IN AN ORGANIZED HEALTH CARE EDUCATION/TRAINING PROGRAM

## 2023-02-22 PROCEDURE — 99999 PR PBB SHADOW E&M-EST. PATIENT-LVL IV: ICD-10-PCS | Mod: PBBFAC,,, | Performed by: STUDENT IN AN ORGANIZED HEALTH CARE EDUCATION/TRAINING PROGRAM

## 2023-02-22 PROCEDURE — 99999 PR PBB SHADOW E&M-EST. PATIENT-LVL IV: CPT | Mod: PBBFAC,,, | Performed by: STUDENT IN AN ORGANIZED HEALTH CARE EDUCATION/TRAINING PROGRAM

## 2023-02-22 RX ORDER — TRAMADOL HYDROCHLORIDE 50 MG/1
50 TABLET ORAL EVERY 6 HOURS PRN
Qty: 36 TABLET | Refills: 0 | Status: SHIPPED | OUTPATIENT
Start: 2023-02-22 | End: 2023-03-16

## 2023-02-22 RX ORDER — OXYCODONE HYDROCHLORIDE 5 MG/1
5 TABLET ORAL EVERY 6 HOURS PRN
Qty: 20 TABLET | Refills: 0 | Status: SHIPPED | OUTPATIENT
Start: 2023-02-22 | End: 2023-03-16

## 2023-02-22 NOTE — LETTER
February 22, 2023      [facility]  [address]  [place] [address]  Phone: [phone]  Fax: [phone]       Patient: Radha Ramachandran   YOB: 1965  Date of Visit: 02/22/2023    To Whom It May Concern:    Alonzo Ramachandran  was at [facility] on 02/22/2023. She underwent right shoulder surgery on 02/09/23. She is currently unable to work due to significant restrictions following her surgery. These restrictions are expected to last for 3-4 months following surgery.  She is currently scheduled for a post-operative follow-up visit on 5/5/23, at which time further recommendations on return to work can be made.  Please consider any accommodations/assistance that can be provided during this time frame as she recovers from surgery. If you have any questions or concerns, or if I can be of further assistance, please do not hesitate to contact me.    Sincerely,      Ramesh Chau MD

## 2023-02-27 ENCOUNTER — TELEPHONE (OUTPATIENT)
Dept: ORTHOPEDICS | Facility: CLINIC | Age: 58
End: 2023-02-27

## 2023-02-27 ENCOUNTER — OFFICE VISIT (OUTPATIENT)
Dept: ORTHOPEDICS | Facility: CLINIC | Age: 58
End: 2023-02-27
Payer: MEDICAID

## 2023-02-27 DIAGNOSIS — M17.12 PRIMARY OSTEOARTHRITIS OF LEFT KNEE: Primary | ICD-10-CM

## 2023-02-27 PROCEDURE — 99999 PR PBB SHADOW E&M-EST. PATIENT-LVL III: CPT | Mod: PBBFAC,,, | Performed by: PHYSICIAN ASSISTANT

## 2023-02-27 PROCEDURE — 99214 OFFICE O/P EST MOD 30 MIN: CPT | Mod: S$PBB,24,25, | Performed by: PHYSICIAN ASSISTANT

## 2023-02-27 PROCEDURE — 99214 PR OFFICE/OUTPT VISIT, EST, LEVL IV, 30-39 MIN: ICD-10-PCS | Mod: S$PBB,24,25, | Performed by: PHYSICIAN ASSISTANT

## 2023-02-27 PROCEDURE — 20610 LARGE JOINT ASPIRATION/INJECTION: L KNEE: ICD-10-PCS | Mod: S$PBB,79,LT, | Performed by: PHYSICIAN ASSISTANT

## 2023-02-27 PROCEDURE — 1159F MED LIST DOCD IN RCRD: CPT | Mod: CPTII,,, | Performed by: PHYSICIAN ASSISTANT

## 2023-02-27 PROCEDURE — 99213 OFFICE O/P EST LOW 20 MIN: CPT | Mod: PBBFAC | Performed by: PHYSICIAN ASSISTANT

## 2023-02-27 PROCEDURE — 20610 DRAIN/INJ JOINT/BURSA W/O US: CPT | Mod: PBBFAC | Performed by: PHYSICIAN ASSISTANT

## 2023-02-27 PROCEDURE — 1159F PR MEDICATION LIST DOCUMENTED IN MEDICAL RECORD: ICD-10-PCS | Mod: CPTII,,, | Performed by: PHYSICIAN ASSISTANT

## 2023-02-27 PROCEDURE — 1160F RVW MEDS BY RX/DR IN RCRD: CPT | Mod: CPTII,,, | Performed by: PHYSICIAN ASSISTANT

## 2023-02-27 PROCEDURE — 1160F PR REVIEW ALL MEDS BY PRESCRIBER/CLIN PHARMACIST DOCUMENTED: ICD-10-PCS | Mod: CPTII,,, | Performed by: PHYSICIAN ASSISTANT

## 2023-02-27 PROCEDURE — 99999 PR PBB SHADOW E&M-EST. PATIENT-LVL III: ICD-10-PCS | Mod: PBBFAC,,, | Performed by: PHYSICIAN ASSISTANT

## 2023-02-27 PROCEDURE — 20610 DRAIN/INJ JOINT/BURSA W/O US: CPT | Mod: S$PBB,79,LT, | Performed by: PHYSICIAN ASSISTANT

## 2023-02-27 RX ORDER — LIDOCAINE HYDROCHLORIDE 10 MG/ML
5 INJECTION INFILTRATION; PERINEURAL
Status: DISCONTINUED | OUTPATIENT
Start: 2023-02-27 | End: 2023-02-27 | Stop reason: HOSPADM

## 2023-02-27 RX ORDER — METHYLPREDNISOLONE ACETATE 80 MG/ML
80 INJECTION, SUSPENSION INTRA-ARTICULAR; INTRALESIONAL; INTRAMUSCULAR; SOFT TISSUE
Status: DISCONTINUED | OUTPATIENT
Start: 2023-02-27 | End: 2023-02-27 | Stop reason: HOSPADM

## 2023-02-27 RX ADMIN — LIDOCAINE HYDROCHLORIDE 5 ML: 10 INJECTION, SOLUTION INFILTRATION; PERINEURAL at 12:02

## 2023-02-27 RX ADMIN — METHYLPREDNISOLONE ACETATE 80 MG: 80 INJECTION, SUSPENSION INTRALESIONAL; INTRAMUSCULAR; INTRASYNOVIAL; SOFT TISSUE at 12:02

## 2023-02-27 NOTE — PROGRESS NOTES
Patient ID: Radha Ramachandran is a 57 y.o. female.    Chief Complaint: No chief complaint on file.      HPI: Radha Ramachandran  is a 57 y.o. female who c/o No chief complaint on file.       Patient presents with chief complaint of left knee pain.  Patient states pain affecting activity of daily living and thus her quality of life at times can get up to a 10/10.  She is not using any devices to assist in ambulation.  She is a hard time walking long distances, going from a sitting to standing standing to sitting position, unlevel terrain or stairs.  She last received a steroid injection 10/25/2022 by our sports Medicine Department.  She states that last for several weeks but has since worn off.    Additionally she underwent a shoulder scope 2 weeks ago and is doing well with that.    Patient is presently denying any shortness of breath, chest pain, fever/chills, nausea/vomiting, loss of taste or smell, numbness/tingling or sensation changes, loss of bladder or bowel function.    Past Medical History:   Diagnosis Date    COPD with asthma     Digestive disorder     Fibromyalgia     H/O psoriatic arthritis     Hx of migraines     Post-traumatic osteoarthritis of left shoulder 12/15/2020    Rheumatoid arthritis        Past Surgical History:   Procedure Laterality Date    ANKLE FUSION Right     APPENDECTOMY      ARTHROPLASTY OF SHOULDER Left 1/25/2021    Procedure: ARTHROPLASTY, SHOULDER;  Surgeon: Ramesh Chau MD;  Location: [facility];  Service: Orthopedics;  Laterality: Left;    ARTHROSCOPIC REPAIR OF ROTATOR CUFF OF SHOULDER Right 2/9/2023    Procedure: REPAIR, ROTATOR CUFF, ARTHROSCOPIC;  Surgeon: Ramesh Chau MD;  Location: [facility] OR;  Service: Orthopedics;  Laterality: Right;  Block as adjunct.   [employer] rep    ARTHROSCOPIC TENOTOMY OF BICEPS TENDON Right 2/9/2023    Procedure: TENOTOMY, BICEPS, ARTHROSCOPIC;  Surgeon: Ramesh Chau MD;  Location: [facility] OR;  Service: Orthopedics;   Laterality: Right;    CHOLECYSTECTOMY      EYELID LACERATION REPAIR Left     HYSTERECTOMY      OPEN REDUCTION AND INTERNAL FIXATION (ORIF) OF INJURY OF HAND  right    ORIF TIBIA & FIBULA FRACTURES Right        Family History   Problem Relation Age of Onset    Heart failure Mother     Stroke Mother     Diabetes Mother     Pacemaker/defibrilator Mother     Coronary artery disease Sister     Hypertension Sister     Schizophrenia Sister     Stroke Maternal Grandmother     Asthma Maternal Grandmother     Gout Maternal Grandfather        Social History     Socioeconomic History    Marital status: Single   Tobacco Use    Smoking status: Every Day     Packs/day: 0.50     Types: Cigarettes    Smokeless tobacco: Never    Tobacco comments:     Started smoking at 15 y/o was up to 2 ppd in 2014 ; started cutting renzo to 1 ppd now down to < 1 ppd    Substance and Sexual Activity    Alcohol use: Not Currently    Drug use: Never    Sexual activity: Not Currently     Partners: Male   Social History Narrative    Live w/ family         Medication List with Changes/Refills   Current Medications    ACETAMINOPHEN (TYLENOL) 500 MG TABLET    Take 2 tablets (1,000 mg total) by mouth every 8 (eight) hours as needed for Pain.    ALBUTEROL (PROVENTIL/VENTOLIN HFA) 90 MCG/ACTUATION INHALER    INHALE 1 PUFF EVERY 4 HOURS AS NEEDED    ALBUTEROL INHL    Inhale into the lungs.    ASPIRIN (ECOTRIN) 81 MG EC TABLET    Take 1 tablet (81 mg total) by mouth 2 (two) times a day. for 14 days    ASPIRIN (ECOTRIN) 81 MG EC TABLET    Take 1 tablet (81 mg total) by mouth 2 (two) times a day. for 14 days    CEFDINIR (OMNICEF) 300 MG CAPSULE    Take 300 mg by mouth once daily.    DICLOFENAC SODIUM (VOLTAREN) 1 % GEL    Apply 2 g topically 4 (four) times daily.    ESCITALOPRAM OXALATE (LEXAPRO) 20 MG TABLET    Take 20 mg by mouth once daily.    FAMOTIDINE (PEPCID) 40 MG TABLET    Take 40 mg by mouth 2 (two) times daily.    FLUTICASONE PROPIONATE (FLONASE) 50  "MCG/ACTUATION NASAL SPRAY    1 spray by Each Nostril route once daily.    LEVOCETIRIZINE (XYZAL) 5 MG TABLET    Take 5 mg by mouth every evening.    LIDOCAINE-PRILOCAINE (EMLA) CREAM        MELOXICAM (MOBIC) 7.5 MG TABLET    TAKE 1 TABLET BY MOUTH EVERY DAY    METHOCARBAMOL (ROBAXIN) 500 MG TAB    TAKE 1 TABLET (500 MG TOTAL) BY MOUTH NIGHTLY AS NEEDED (MUSCLE SPASMS OR PAIN).    NAPROXEN (NAPROSYN) 500 MG TABLET    Take 1 tablet (500 mg total) by mouth 2 (two) times daily with meals.    NITROFURANTOIN, MACROCRYSTAL-MONOHYDRATE, (MACROBID) 100 MG CAPSULE    Take 100 mg by mouth 2 (two) times daily.    ONDANSETRON (ZOFRAN-ODT) 4 MG TBDL    Take 4 mg by mouth every 8 (eight) hours as needed.    OXYCODONE (ROXICODONE) 5 MG IMMEDIATE RELEASE TABLET    Take 1 tablet (5 mg total) by mouth every 6 (six) hours as needed for Pain (post-op pain).    SPIRIVA WITH HANDIHALER 18 MCG INHALATION CAPSULE    SMARTSI Puff(s) Via Inhaler Daily    TRAMADOL (ULTRAM) 50 MG TABLET    Take 1 tablet (50 mg total) by mouth every 6 (six) hours as needed for Pain.       Review of patient's allergies indicates:   Allergen Reactions    Latex, natural rubber Hives     Pt "forgot' to report allergy until rash noted on L forearm.           Objective:     Left Lower Extremity    KNEE:  ROM: passive flex/ ext full  Patella midling, crepitus noted   Ligaments stable  Pain on palpation to medial and lateral as well as patella aspect  Calf NT, soft  (-) Qamar sign  DF/PF full  Wiggles toes  Sensation intact to light touch   No pitting edema appreciated   NVI  Cap refill < 2 sec    Skin warm to touch, no obvious lesion noted       IMAGING:    XRAY:  FINDINGS:      BONES: There are no fractures nor dislocations involving the left knee.  Tricompartmental osteophytes are present with total loss of the lateral tibiofemoral compartment.  No joint effusion is present.         SOFT TISSUES: No calcifications         MISC: none        Impression:    " Tricompartmental osteoarthritis with total lateral compartment loss    Kellgren Delmer scale : 3     EMG:  No record on file    Assessment:       Encounter Diagnosis   Name Primary?    Primary osteoarthritis of left knee Yes          Plan:       Diagnoses and all orders for this visit:    Primary osteoarthritis of left knee  -     Large Joint Aspiration/Injection: L knee        Radha Ramachandran is a new patient who presents to me today with chief complaint of left knee pain. We had a long discussion today regarding degenerative arthritis in the knees. The patient understands that arthritis is chronic and will worsen over time.  The patient also understands that arthritis may cause episodic flare-ups in pain. Management or if arthritis is achieved through a multi-modal approach including weight loss in obese individuals, activity modification, NSAIDs (topical vs oral) where appropriate, periodic intra-articular steroid injections, viscosupplementation, physical therapy, knee bracing, ambulatory aids, as well as geniculate nerve blocks.  The patient elected to proceed with left knee steroid injection today.  She was instructed for light activity with elevation and ice as needed.  She was told these may be repeated every 3 months.  I would like to see her back in that timeframe.  She may call with any questions or concerns in the interim.  She is to continue her current medication regimen and treatment plan.    Dr. Jiang is aware of the patient & current presentation. He agrees with the current plan above.     Patient verbalized understanding of all instructions and agreed with the above plan.    No follow-ups on file.    The patient understands, chooses and consents to this plan and accepts all   the risks which include but are not limited to the risks mentioned above.     Disclaimer: This note was prepared using a voice recognition system and is likely to have sound alike errors within the text.

## 2023-02-27 NOTE — PROCEDURES
Large Joint Aspiration/Injection: L knee    Date/Time: 2/27/2023 12:00 PM  Performed by: YANA Coates  Authorized by: YANA Coates     Consent Done?:  Yes (Verbal)  Indications:  Arthritis and pain  Site marked: the procedure site was marked      Local anesthesia used?: Yes      Details:  Needle Size:  22 G  Approach:  Anterolateral  Location:  Knee  Site:  L knee  Medications:  5 mL LIDOcaine HCL 10 mg/ml (1%) 10 mg/mL (1 %); 80 mg methylPREDNISolone acetate 80 mg/mL  Patient tolerance:  Patient tolerated the procedure well with no immediate complications     Verbal consent was obtained  The patient's ID, site, side was verified  The site was sterile prepped in standard fashion  The injection was performed in the latasha-lateral side without complication  A sterile Band-Aid was applied    Patient was directed to apply ice today at roughly 15 minutes at a time as needed.  It was discussed that they may be sore for the next few days or so.  Please avoid strenuous activity over the next 24 hours.  It was also discussed that the patient may have a increase in glucose if diabetic and should monitor levels.  Patient was instructed to call as needed.

## 2023-02-27 NOTE — TELEPHONE ENCOUNTER
Called the patient in regards to scheduling a 3 month follow up appointment. I got the patient schedule to see Daly on 05/29/23 at 12:00pm for a 3 month follow up appointment. Patient verbalized understanding.

## 2023-03-01 ENCOUNTER — TELEPHONE (OUTPATIENT)
Dept: ORTHOPEDICS | Facility: CLINIC | Age: 58
End: 2023-03-01
Payer: MEDICAID

## 2023-03-01 NOTE — TELEPHONE ENCOUNTER
Called and spoke to patient in regards to her concerns. Informed her that as long as she was not taking any pain medication and felt comfortable with driving then it was safe for her. Patient voiced understanding and was grateful for the call. CORI     ----- Message from Angela Franco sent at 3/1/2023 12:49 PM CST -----  Contact: Radha Garcia would like a call back at [phone], Regards to if she is able to start back driving.    Thanks  Td

## 2023-03-02 ENCOUNTER — CLINICAL SUPPORT (OUTPATIENT)
Dept: REHABILITATION | Facility: HOSPITAL | Age: 58
End: 2023-03-02
Attending: STUDENT IN AN ORGANIZED HEALTH CARE EDUCATION/TRAINING PROGRAM
Payer: MEDICAID

## 2023-03-02 DIAGNOSIS — M25.511 CHRONIC RIGHT SHOULDER PAIN: Primary | ICD-10-CM

## 2023-03-02 DIAGNOSIS — G89.29 CHRONIC RIGHT SHOULDER PAIN: Primary | ICD-10-CM

## 2023-03-02 PROCEDURE — 97110 THERAPEUTIC EXERCISES: CPT | Mod: PO

## 2023-03-02 NOTE — PROGRESS NOTES
Physical Therapy Daily Treatment Note     Name: Radha Ramachandran  Clinic Number: [id number]    Therapy Diagnosis: No diagnosis found.  Physician: Ramesh Chau*    Visit Date: 3/2/2023  Physician Orders: PT Eval and Treat  Medical Diagnosis from Referral: S/P right rotator cuff repair [Z98.890]  Evaluation Date: 2/20/2023  Authorization Period Expiration: 1/25/2023 - 1/25/2024  Plan of Care Expiration: 8-7-2023  Visit # / Visits authorized: 1/ 1    FOTO 1st: 32%  FOTO 5th:  FOTO 10th:     Time In: 0800  Time Out: 0900  Total Billable Time: 60 minutes    Precautions: Standard    DOS: 2/9/23  POD: 3 wks    Subjective     Pt reports: that she slept poorly 3 hours.   .  She was compliant with home exercise program.  Response to previous treatment: progressing  Functional change: progressing     Pain: 9/10  Location: right shoulder      Objective     Passive Range of Motion:   Shoulder Right  Left    Flexion 50 135   Abduction 80 140   ER  15 75   IR 30 25       Treatment   Treatment time:  **billed per medicaid guidelines**    Radha received therapeutic exercises  Patient education on posture, exercise and form  Don/doff sling  Therapeutic rest 4x 1' due to shoulder pain  Ext isos 2x 10 10s  Seated wrist flex/ext, pro/sup 3x 20 2-3# 35 bpm      Radha received the following manual therapy techniques:    PROM ER/IR  per protocol  Isos ER/IR 5-10% effort     Radha participated in neuromuscular re-education   Don/doff sling edu   Rhythmic stabilization sub max IR/ER, elbow flex/ext   Seated posture edu     Radha participated in dynamic functional therapeutic activities to improve functional performance        Home Exercises Provided and Patient Education Provided     Education provided:   - patient education on activities and precautions     Written Home Exercises Provided: Patient instructed to cont prior HEP.  Exercises were reviewed and Radha was able to demonstrate them prior to the end of the session.  Radha  demonstrated good  understanding of the education provided.     See EMR under Patient Instructions for exercises provided prior visit.    Assessment     Pt presents with pain. Tolerated treatment well with PROM to 90 deg and IR/Er to 30 deg comfortably. Tolerated addition of isos and distal loading well with moderate pain. Continue to progress Range of Motion and strength per protocol.       Radha Is progressing well towards her goals.   Pt prognosis is Excellent.     Pt will continue to benefit from skilled outpatient physical therapy to address the deficits listed in the problem list box on initial evaluation, provide pt/family education and to maximize pt's level of independence in the home and community environment.     Pt's spiritual, cultural and educational needs considered and pt agreeable to plan of care and goals.    Anticipated barriers to physical therapy: none    Goals:  Short Term Goals: 2-12 weeks   Pt will be able to demonstrate >110 deg of PROM flexion and ABD.  Pt will be able to demonstrate > 45 deg PROM ER.  Pt will be able to demonstrate full PROM of RUE..  Pt will be able to demonstrate IR and ER Yellow Theraband x10 no pain.  Pt will be able to demonstrate AAROM table slide x20.  Pt will be able to demonstrate lift 1# to 120 deg AROM flexion.     Long Term Goals: 12-24 weeks   Pt will be able to demonstrate full AROM or RUE.  Pt will be able to demonstrate push pull 30# sled.  Pt will be able to demonstrate lift 5# OH single arm  Pt will be able to demonstrate 30# OH double arm  Pt will subjectively report satisfactory completion of all ADLs, recreation, and work duties.     Plan     Improve PROM/AROM, strengthening per protocol, pattern functional activity neuro-muscular control for PLOF    Angle Bailey, PT, DPT

## 2023-03-09 NOTE — PROGRESS NOTES
"    Physical Therapy Daily Treatment Note     Name: Radha Ramachandran  Clinic Number: [id number]    Therapy Diagnosis:   Encounter Diagnosis   Name Primary?    Chronic right shoulder pain Yes     Physician: Ramesh Chau*    Visit Date: 3/10/2023  Physician Orders: PT Eval and Treat  Medical Diagnosis from Referral: S/P right rotator cuff repair [Z98.890]  Evaluation Date: 2/20/2023  Authorization Period Expiration: 1/25/2023 - 1/25/2024  Plan of Care Expiration: 8-7-2023  Visit # / Visits authorized: 2/251    FOTO 1st: 32%  FOTO 5th:  FOTO 10th:     Time In: 1000  Time Out: 1055  Total Billable Time: 55 minutes    Precautions: Standard    DOS: 2/9/23  POD: 4 wks    Subjective     Pt reports:"today my pain is bearable but it's probably because I have taken some OTC medications. I don't really have a lot of pain at home except when I'm sleeping."    She was compliant with home exercise program.  Response to previous treatment: progressing  Functional change: progressing     Pain: 7/10  Location: right shoulder      Objective     Passive Range of Motion:   Shoulder Right  Left    Flexion 90 135   Abduction 80 140   ER  20 75   IR 30 25       Treatment   Treatment time:  **billed per medicaid guidelines**    Radha received therapeutic exercises  Patient education on posture, exercise and form  Don/doff sling  Shoulder abduction LLLD w/ heat 5 min  Ext isos 5x10s  Seated wrist flex/ext, pro/sup 1# 3x15  Seated scap retractions 2x15, 2s iso      Radha received the following manual therapy techniques:    PROM ER/IR  per protocol  Grade 1 left GH oscillations  Manual scapular mob pro/retractions     Radha participated in neuromuscular re-education   Don/doff sling edu   Rhythmic stabilization sub max IR/ER, elbow flex/ext   Seated posture edu     Radha participated in dynamic functional therapeutic activities to improve functional performance        Home Exercises Provided and Patient Education Provided     Education " "provided:   - patient education on activities and precautions     Written Home Exercises Provided: Patient instructed to cont prior HEP.  Exercises were reviewed and Radha was able to demonstrate them prior to the end of the session.  Radha demonstrated good  understanding of the education provided.     See EMR under Patient Instructions for exercises provided prior visit.    Assessment     Progressed patient to ~90 deg of passive shoulder flexion; patient tolerated well, denies pain and reports "comfort." Continue to progress Range of Motion and strength per protocol.       Radha Is progressing well towards her goals.   Pt prognosis is Excellent.     Pt will continue to benefit from skilled outpatient physical therapy to address the deficits listed in the problem list box on initial evaluation, provide pt/family education and to maximize pt's level of independence in the home and community environment.     Pt's spiritual, cultural and educational needs considered and pt agreeable to plan of care and goals.    Anticipated barriers to physical therapy: none    Goals:  Short Term Goals: 2-12 weeks   Pt will be able to demonstrate >110 deg of PROM flexion and ABD.  Pt will be able to demonstrate > 45 deg PROM ER.  Pt will be able to demonstrate full PROM of RUE..  Pt will be able to demonstrate IR and ER Yellow Theraband x10 no pain.  Pt will be able to demonstrate AAROM table slide x20.  Pt will be able to demonstrate lift 1# to 120 deg AROM flexion.     Long Term Goals: 12-24 weeks   Pt will be able to demonstrate full AROM or RUE.  Pt will be able to demonstrate push pull 30# sled.  Pt will be able to demonstrate lift 5# OH single arm  Pt will be able to demonstrate 30# OH double arm  Pt will subjectively report satisfactory completion of all ADLs, recreation, and work duties.     Plan     Improve PROM/AROM, strengthening per protocol, pattern functional activity neuro-muscular control for PLOF    Haseeb Walls, PT, " DPT

## 2023-03-10 ENCOUNTER — CLINICAL SUPPORT (OUTPATIENT)
Dept: REHABILITATION | Facility: HOSPITAL | Age: 58
End: 2023-03-10
Attending: STUDENT IN AN ORGANIZED HEALTH CARE EDUCATION/TRAINING PROGRAM
Payer: MEDICAID

## 2023-03-10 DIAGNOSIS — M25.511 CHRONIC RIGHT SHOULDER PAIN: Primary | ICD-10-CM

## 2023-03-10 DIAGNOSIS — G89.29 CHRONIC RIGHT SHOULDER PAIN: Primary | ICD-10-CM

## 2023-03-10 PROCEDURE — 97110 THERAPEUTIC EXERCISES: CPT | Mod: PO

## 2023-03-21 NOTE — PROGRESS NOTES
Orthopaedics  Post-operative follow-up    Procedure Performed:  1. Right shoulder arthroscopic rotator cuff repair, including subscapularis  2. Right shoulder limited debridement     Date of Surgery: 2/9/23    Subjective: Radha Ramachandran is now approximately 6 weeks out from her shoulder surgery.  She has been compliant with post-operative restrictions and has been progressing with PT at [facility].  Her pain and function continue to improve. She is happy with her progress thus far.     Exam:  Incision sites healed, C/D/I  No swelling or bruising noted  Fluid ROM with no crepitus  Upright AAROM: , ABD 90, ER 40  Axillary nerve sensation and motor intact  Motor and sensory intact distally  Strong radial pulse, fingers warm and well perfused    Imaging:  No new imaging.     Impression:  S/p right shoulder arthroscopic rotator cuff repair, including subscapularis, and limited debridement, second post-operative visit - doing well    Plan:  She continues to make progress with her pain and function. Discussed that I am happy with her progress thus far and she is right where I expect her to be.   Advance PT per protocol  Symptomatic treatment for pain / swelling  May advance activities as reviewed today: Discontinue sling, initiate progression of AAROM and AROM.   Instructed patient to call clinic if questions or concerns    Follow-up in 6 weeks at 3 months post-op (around 5/9/23)    Work status:  For weeks 0-6 from now:  1) Discontinue sling  2) Continue physical therapy  3) No lifting/pushing/pulling greater than 2 pounds  4) No overhead work  5) No repetitive motions        Ramesh Chau MD    I, Rob Fan, acted as a scribe for Ramesh Chau MD for the duration of this office visit.

## 2023-03-24 ENCOUNTER — TELEPHONE (OUTPATIENT)
Dept: ORTHOPEDICS | Facility: CLINIC | Age: 58
End: 2023-03-24
Payer: MEDICAID

## 2023-03-24 ENCOUNTER — OFFICE VISIT (OUTPATIENT)
Dept: ORTHOPEDICS | Facility: CLINIC | Age: 58
End: 2023-03-24
Payer: MEDICAID

## 2023-03-24 VITALS — BODY MASS INDEX: 22.66 KG/M2 | HEIGHT: 65 IN | WEIGHT: 136 LBS

## 2023-03-24 DIAGNOSIS — Z98.890 S/P RIGHT ROTATOR CUFF REPAIR: Primary | ICD-10-CM

## 2023-03-24 PROCEDURE — 1160F PR REVIEW ALL MEDS BY PRESCRIBER/CLIN PHARMACIST DOCUMENTED: ICD-10-PCS | Mod: CPTII,,, | Performed by: STUDENT IN AN ORGANIZED HEALTH CARE EDUCATION/TRAINING PROGRAM

## 2023-03-24 PROCEDURE — 99214 OFFICE O/P EST MOD 30 MIN: CPT | Mod: PBBFAC | Performed by: STUDENT IN AN ORGANIZED HEALTH CARE EDUCATION/TRAINING PROGRAM

## 2023-03-24 PROCEDURE — 99999 PR PBB SHADOW E&M-EST. PATIENT-LVL IV: CPT | Mod: PBBFAC,,, | Performed by: STUDENT IN AN ORGANIZED HEALTH CARE EDUCATION/TRAINING PROGRAM

## 2023-03-24 PROCEDURE — 1159F MED LIST DOCD IN RCRD: CPT | Mod: CPTII,,, | Performed by: STUDENT IN AN ORGANIZED HEALTH CARE EDUCATION/TRAINING PROGRAM

## 2023-03-24 PROCEDURE — 99024 PR POST-OP FOLLOW-UP VISIT: ICD-10-PCS | Mod: ,,, | Performed by: STUDENT IN AN ORGANIZED HEALTH CARE EDUCATION/TRAINING PROGRAM

## 2023-03-24 PROCEDURE — 1160F RVW MEDS BY RX/DR IN RCRD: CPT | Mod: CPTII,,, | Performed by: STUDENT IN AN ORGANIZED HEALTH CARE EDUCATION/TRAINING PROGRAM

## 2023-03-24 PROCEDURE — 99999 PR PBB SHADOW E&M-EST. PATIENT-LVL IV: ICD-10-PCS | Mod: PBBFAC,,, | Performed by: STUDENT IN AN ORGANIZED HEALTH CARE EDUCATION/TRAINING PROGRAM

## 2023-03-24 PROCEDURE — 3008F PR BODY MASS INDEX (BMI) DOCUMENTED: ICD-10-PCS | Mod: CPTII,,, | Performed by: STUDENT IN AN ORGANIZED HEALTH CARE EDUCATION/TRAINING PROGRAM

## 2023-03-24 PROCEDURE — 99024 POSTOP FOLLOW-UP VISIT: CPT | Mod: ,,, | Performed by: STUDENT IN AN ORGANIZED HEALTH CARE EDUCATION/TRAINING PROGRAM

## 2023-03-24 PROCEDURE — 1159F PR MEDICATION LIST DOCUMENTED IN MEDICAL RECORD: ICD-10-PCS | Mod: CPTII,,, | Performed by: STUDENT IN AN ORGANIZED HEALTH CARE EDUCATION/TRAINING PROGRAM

## 2023-03-24 PROCEDURE — 3008F BODY MASS INDEX DOCD: CPT | Mod: CPTII,,, | Performed by: STUDENT IN AN ORGANIZED HEALTH CARE EDUCATION/TRAINING PROGRAM

## 2023-03-24 NOTE — LETTER
March 24, 2023      [facility]  [address]  [place] [address]  Phone: [phone]  Fax: [phone]       Patient: Radha Ramachandran   YOB: 1965  Date of Visit: 03/24/2023    To Whom It May Concern:    Alonzo Ramachandran  was at [facility] on 03/24/2023. The patient may return to work/school on 3/25/23 with the following restrictions:      Patient ok to stand for prolonged period of time.   Allow time for physical therapy   No lifting/pushing/pulling greater than 2 pounds   No overhead work   No repetitive motions      If you have any questions or concerns, or if I can be of further assistance, please do not hesitate to contact me.    Sincerely,      Ramesh Chau MD

## 2023-03-24 NOTE — TELEPHONE ENCOUNTER
----- Message from Adiel Diaz sent at 3/24/2023 10:48 AM CDT -----  Contact: [phone]  Patient stated she is needing a Stipulation release form. Please call back at [phone]. Thanks KD

## 2023-03-24 NOTE — TELEPHONE ENCOUNTER
Called patient and clarified restrictions for her work letter from our office. Answered all questions. Patient verbalized understanding and was grateful for the call back.

## 2023-03-27 ENCOUNTER — CLINICAL SUPPORT (OUTPATIENT)
Dept: REHABILITATION | Facility: HOSPITAL | Age: 58
End: 2023-03-27
Attending: STUDENT IN AN ORGANIZED HEALTH CARE EDUCATION/TRAINING PROGRAM
Payer: MEDICAID

## 2023-03-27 DIAGNOSIS — M25.511 CHRONIC RIGHT SHOULDER PAIN: Primary | ICD-10-CM

## 2023-03-27 DIAGNOSIS — G89.29 CHRONIC RIGHT SHOULDER PAIN: Primary | ICD-10-CM

## 2023-03-27 PROCEDURE — 97110 THERAPEUTIC EXERCISES: CPT | Mod: PO

## 2023-03-27 NOTE — PROGRESS NOTES
Physical Therapy Daily Treatment Note     Name: Radha Ramachandran  Clinic Number: [id number]    Therapy Diagnosis:   Encounter Diagnosis   Name Primary?    Chronic right shoulder pain Yes     Physician: Ramesh Chau*    Visit Date: 3/27/2023  Physician Orders: PT Eval and Treat  Medical Diagnosis from Referral: S/P right rotator cuff repair [Z98.890]  Evaluation Date: 2/20/2023  Authorization Period Expiration: 1/25/2023 - 1/25/2024  Plan of Care Expiration: 8-7-2023  Visit # / Visits authorized: 2/251    FOTO 1st: 32%  FOTO 5th:  FOTO 10th:     Time In: 0900  Time Out: 1000  Total Billable Time: 55 minutes    Precautions: Standard    DOS: 2/9/23  POD: 7 wks    Subjective     Pt reports: exercises: pendulums, 1-3#  below 45 deg flexion, AROM distal jt.  Tylenol as rx on the bottle    She was compliant with home exercise program.  Response to previous treatment: progressing  Functional change: progressing     Pain: 5/10  Location: right shoulder      Objective     Passive Range of Motion:   Shoulder Right  Left    Flexion 90 135   Abduction 90 140   ER  45 75   IR 45 25       Treatment   Treatment time:  **billed per medicaid guidelines**    Radha received therapeutic exercises  Patient education on posture, exercise and form  Shoulder abduction heat 5 min  Table slides edu,  2x5   ER/IR isometrics 50% effort      Not today  Ext isos 5x10s  Seated wrist flex/ext, pro/sup 1# 3x15  Seated scap retractions 2x15, 2s iso      Radha received the following manual therapy techniques:    PROM ABD/Flexion/ ER/IR  per protocol  Manual scapular mob pro/retractions   Contract relax  - ADD and ext    Ischemia release proximal lat and Teres minor     Radha participated in neuromuscular re-education   SL flexion with PT: serratus activation  SL serratus activation   SL protraction retraction   Pulleys with cueing on serratus activation      Radha participated in dynamic functional therapeutic activities to improve  functional performance        Home Exercises Provided and Patient Education Provided     Education provided:   - patient education on activities and precautions     Written Home Exercises Provided: Patient instructed to cont prior HEP.  Exercises were reviewed and Radha was able to demonstrate them prior to the end of the session.  Radha demonstrated good  understanding of the education provided.     See EMR under Patient Instructions for exercises provided prior visit.    Assessment   Pt presents with good carryover of Range of Motion and no sling today. Pt tolerated neuro- reeducation  well with mod cueing needed for serratus activation with good progressions into SL flexion. Pt was able to get to 100-90 deg of flexion with pulleys and minimal pain. Pt needed mod cueing for serratus and upwd rotation during this. Added ER.IR isometrics and table slides to HEP.       Radha Is progressing well towards her goals.   Pt prognosis is Excellent.     Pt will continue to benefit from skilled outpatient physical therapy to address the deficits listed in the problem list box on initial evaluation, provide pt/family education and to maximize pt's level of independence in the home and community environment.     Pt's spiritual, cultural and educational needs considered and pt agreeable to plan of care and goals.    Anticipated barriers to physical therapy: none    Goals:  Short Term Goals: 2-12 weeks   Pt will be able to demonstrate >110 deg of PROM flexion and ABD.  Pt will be able to demonstrate > 45 deg PROM ER.  Pt will be able to demonstrate full PROM of RUE..  Pt will be able to demonstrate IR and ER Yellow Theraband x10 no pain.  Pt will be able to demonstrate AAROM table slide x20.  Pt will be able to demonstrate lift 1# to 120 deg AROM flexion.     Long Term Goals: 12-24 weeks   Pt will be able to demonstrate full AROM or RUE.  Pt will be able to demonstrate push pull 30# sled.  Pt will be able to demonstrate lift 5# OH  single arm  Pt will be able to demonstrate 30# OH double arm  Pt will subjectively report satisfactory completion of all ADLs, recreation, and work duties.     Plan     Improve PROM/AROM, strengthening per protocol, pattern functional activity neuro-muscular control for PLOF    Haseeb Walls, PT, DPT

## 2023-03-28 ENCOUNTER — TELEPHONE (OUTPATIENT)
Dept: ORTHOPEDICS | Facility: CLINIC | Age: 58
End: 2023-03-28
Payer: MEDICAID

## 2023-03-28 ENCOUNTER — PATIENT MESSAGE (OUTPATIENT)
Dept: ORTHOPEDICS | Facility: CLINIC | Age: 58
End: 2023-03-28
Payer: MEDICAID

## 2023-03-28 ENCOUNTER — TELEPHONE (OUTPATIENT)
Dept: DERMATOLOGY | Facility: CLINIC | Age: 58
End: 2023-03-28
Payer: MEDICAID

## 2023-03-28 DIAGNOSIS — Z98.890 S/P RIGHT ROTATOR CUFF REPAIR: Primary | ICD-10-CM

## 2023-03-28 RX ORDER — NAPROXEN 500 MG/1
500 TABLET ORAL 2 TIMES DAILY
Qty: 60 TABLET | Refills: 2 | Status: SHIPPED | OUTPATIENT
Start: 2023-03-28 | End: 2023-06-22

## 2023-03-28 NOTE — TELEPHONE ENCOUNTER
Called and spoke with patient regarding her recent wunderloop message. Requesting antin-inflammatory medication. Rx for Naproxen 500 BID provided, encouraged to take twice daily as needed for pain, Patient voiced understanding and was grateful for the call back    ----- Message from Lily Anaya sent at 3/28/2023  1:31 PM CDT -----  Pt is requesting a call back concerning a call she missed. Call back number is [phone]  [facility]

## 2023-03-31 ENCOUNTER — CLINICAL SUPPORT (OUTPATIENT)
Dept: REHABILITATION | Facility: HOSPITAL | Age: 58
End: 2023-03-31
Attending: STUDENT IN AN ORGANIZED HEALTH CARE EDUCATION/TRAINING PROGRAM
Payer: MEDICAID

## 2023-03-31 DIAGNOSIS — G89.29 CHRONIC RIGHT SHOULDER PAIN: Primary | ICD-10-CM

## 2023-03-31 DIAGNOSIS — M25.511 CHRONIC RIGHT SHOULDER PAIN: Primary | ICD-10-CM

## 2023-03-31 PROCEDURE — 97112 NEUROMUSCULAR REEDUCATION: CPT | Mod: PO

## 2023-03-31 PROCEDURE — 97110 THERAPEUTIC EXERCISES: CPT | Mod: PO

## 2023-03-31 NOTE — PROGRESS NOTES
Physical Therapy Daily Treatment Note     Name: Radha Ramachandran  Clinic Number: [id number]    Therapy Diagnosis:   Encounter Diagnosis   Name Primary?    Chronic right shoulder pain Yes       Physician: Ramesh Chau*    Visit Date: 3/31/2023  Physician Orders: PT Eval and Treat  Medical Diagnosis from Referral: S/P right rotator cuff repair [Z98.890]  Evaluation Date: 2/20/2023  Authorization Period Expiration: 1/25/2023 - 1/25/2024  Plan of Care Expiration: 8-7-2023  Visit # / Visits authorized: 2/251    FOTO 1st: 32%  FOTO 5th:  FOTO 10th:     Time In: 0911  Time Out: 1000  Total Billable Time: 59 minutes    Precautions: Standard    DOS: 2/9/23  POD: 7 wks    Subjective     Pt reports: slight right shoulder discomfort with sleeping since doffing sling.    She was compliant with home exercise program.  Response to previous treatment: progressing  Functional change: progressing     Pain: 3/10  Location: right shoulder      Objective     Passive Range of Motion:   Shoulder Right  Left    Flexion 120 135   Abduction 90 140   ER  45 75   IR 45 25       Treatment   Treatment time:  **billed per medicaid guidelines**    Radha received therapeutic exercises  Patient education on posture, exercise and form  Shoulder abduction heat 5 min  Table slides edu,  2x10  ER/IR/ADD/ABD isometrics 50% effort      Not today  Ext isos 5x10s  Seated wrist flex/ext, pro/sup 1# 3x15  Seated scap retractions 2x15, 2s iso      Radha received the following manual therapy techniques:    PROM ABD/Flexion/ ER/IR  per protocol  Manual scapular mob pro/retractions   Contract relax  - ADD and ext    Ischemia release proximal lat and Teres minor     Radha participated in neuromuscular re-education   SL flexion with PT: serratus activation-NP  SL serratus activation-NP   Seated protraction retraction   Pulleys with cueing on serratus activation  Prone scap retraction 5x30s iso (towel under humerus)    Radha participated in dynamic functional  therapeutic activities to improve functional performance        Home Exercises Provided and Patient Education Provided     Education provided:   - patient education on activities and precautions     Written Home Exercises Provided: Patient instructed to cont prior HEP.  Exercises were reviewed and Radha was able to demonstrate them prior to the end of the session.  Radha demonstrated good  understanding of the education provided.     See EMR under Patient Instructions for exercises provided prior visit.    Assessment   Poor control of periscapular muscular; required max cueing to facilitate isometric contractions in prone.  Pt also needed mod cueing for serratus and upwd rotation during this. Continue to address motor control deficits and introduce loading when appropriate.     Radha Is progressing well towards her goals.   Pt prognosis is Excellent.     Pt will continue to benefit from skilled outpatient physical therapy to address the deficits listed in the problem list box on initial evaluation, provide pt/family education and to maximize pt's level of independence in the home and community environment.     Pt's spiritual, cultural and educational needs considered and pt agreeable to plan of care and goals.    Anticipated barriers to physical therapy: none    Goals:  Short Term Goals: 2-12 weeks   Pt will be able to demonstrate >110 deg of PROM flexion and ABD.  Pt will be able to demonstrate > 45 deg PROM ER.  Pt will be able to demonstrate full PROM of RUE..  Pt will be able to demonstrate IR and ER Yellow Theraband x10 no pain.  Pt will be able to demonstrate AAROM table slide x20.  Pt will be able to demonstrate lift 1# to 120 deg AROM flexion.     Long Term Goals: 12-24 weeks   Pt will be able to demonstrate full AROM or RUE.  Pt will be able to demonstrate push pull 30# sled.  Pt will be able to demonstrate lift 5# OH single arm  Pt will be able to demonstrate 30# OH double arm  Pt will subjectively report  satisfactory completion of all ADLs, recreation, and work duties.     Plan     Improve PROM/AROM, strengthening per protocol, pattern functional activity neuro-muscular control for PLOF    Haseeb Walls, PT, DPT

## 2023-04-03 ENCOUNTER — PATIENT MESSAGE (OUTPATIENT)
Dept: REHABILITATION | Facility: HOSPITAL | Age: 58
End: 2023-04-03

## 2023-04-03 ENCOUNTER — TELEPHONE (OUTPATIENT)
Dept: REHABILITATION | Facility: HOSPITAL | Age: 58
End: 2023-04-03

## 2023-04-07 ENCOUNTER — TELEPHONE (OUTPATIENT)
Dept: ORTHOPEDICS | Facility: CLINIC | Age: 58
End: 2023-04-07
Payer: MEDICAID

## 2023-04-07 NOTE — TELEPHONE ENCOUNTER
Patient requested letter for return to work on 4/17/23 with similar restrictions as previous letter. 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 CORI    ----- Message from Sumi Camacho sent at 4/6/2023  1:38 PM CDT -----  Contact: [phone]  Pt is calling in regards to getting a letter for work release. Please call pt back at [phone]. Thanks KB

## 2023-04-10 ENCOUNTER — TELEPHONE (OUTPATIENT)
Dept: REHABILITATION | Facility: HOSPITAL | Age: 58
End: 2023-04-10

## 2023-04-10 DIAGNOSIS — G89.29 CHRONIC RIGHT SHOULDER PAIN: Primary | ICD-10-CM

## 2023-04-10 DIAGNOSIS — M25.511 CHRONIC RIGHT SHOULDER PAIN: Primary | ICD-10-CM

## 2023-04-10 NOTE — TELEPHONE ENCOUNTER
Pt states she has had issues with her vehicle and getting re scheduled. States that she is back to work next Monday. States that she will be here on Friday. States that she is doing HEP and her shoulder is sore by her collarbone. Pt edu to come in Friday for assessment and restructure scheduled .    Angle Bailey, PT, DPT

## 2023-04-11 ENCOUNTER — TELEPHONE (OUTPATIENT)
Dept: ORTHOPEDICS | Facility: CLINIC | Age: 58
End: 2023-04-11
Payer: MEDICAID

## 2023-04-12 NOTE — TELEPHONE ENCOUNTER
Spoke to patient in regards to letter. She stated that she is going to have further discussions with her supervisor about restrictions at work and she will contact us if she needs a new letter. CORI     ----- Message from Adiel Diaz sent at 4/11/2023  9:18 AM CDT -----  Contact: [phone]  Patient requesting a call back in regards to a release form. Please call back at [phone]. Thanks   KD

## 2023-04-14 ENCOUNTER — CLINICAL SUPPORT (OUTPATIENT)
Dept: REHABILITATION | Facility: HOSPITAL | Age: 58
End: 2023-04-14
Attending: STUDENT IN AN ORGANIZED HEALTH CARE EDUCATION/TRAINING PROGRAM
Payer: MEDICAID

## 2023-04-14 DIAGNOSIS — G89.29 CHRONIC RIGHT SHOULDER PAIN: Primary | ICD-10-CM

## 2023-04-14 DIAGNOSIS — M25.511 CHRONIC RIGHT SHOULDER PAIN: Primary | ICD-10-CM

## 2023-04-14 PROCEDURE — 97110 THERAPEUTIC EXERCISES: CPT | Mod: PO

## 2023-04-14 NOTE — PROGRESS NOTES
Physical Therapy Daily Treatment Note     Name: Radha Ramachandran  Clinic Number: [id number]    Therapy Diagnosis:   Encounter Diagnosis   Name Primary?    Chronic right shoulder pain Yes       Physician: Ramesh Chau*    Visit Date: 4/14/2023  Physician Orders: PT Eval and Treat  Medical Diagnosis from Referral: S/P right rotator cuff repair [Z98.890]  Evaluation Date: 2/20/2023  Authorization Period Expiration: 1/25/2023 - 1/25/2024  Plan of Care Expiration: 8-7-2023  Visit # / Visits authorized: 2/251    FOTO 1st: 32%  FOTO 5th:  FOTO 10th:     Time In: 0852  Time Out: 1000  Total Billable Time: 68 minutes    Precautions: Standard    DOS: 2/9/23  POD: 9 wks    Subjective     Pt reports: feels good overall.  Feels an achey. States she took .5mg naproxen 630-7am.     She was compliant with home exercise program.  Response to previous treatment: progressing  Functional change: progressing     Pain: 2/10  Location: right shoulder      Objective     Passive Range of Motion:   Shoulder Right  Left    Flexion 135 135   Abduction 130 140   ER  65 75   IR 50 25       Treatment   Treatment time:  **billed per medicaid guidelines**    Radha received therapeutic exercises  Patient education on posture, exercise and form  AAROM 4x 10   ER/IR/ADD/ABD isometrics 50% effort  Pulleys 3x 1'  Row 0 deg ABD walk outs 4x 10  Standing A walk outs 4x 10       Not today  Shoulder abduction heat 5 min  Ext isos 5x10s  Seated wrist flex/ext, pro/sup 1# 3x15  Seated scap retractions 2x15, 2s iso      Radha received the following manual therapy techniques:   PROM ABD/Flexion/ ER/IR  per protocol  SL flexion with scap upwd rot  SL flexion with inf glide (135 deg)    Not today     Manual scapular mob pro/retractions   Contract relax  - ADD and ext    Ischemia release proximal lat and Teres minor     Radha participated in neuromuscular re-education   Rhythmic stabilization 90 deg flexion 5x 30s  Prone mid trap re-training   Prone scap  retraction 3x10  Prone modified Y  3x10     Not today    Radha participated in dynamic functional therapeutic activities to improve functional performance        Home Exercises Provided and Patient Education Provided     Education provided:   - patient education on activities and precautions     Written Home Exercises Provided: Patient instructed to cont prior HEP.  Exercises were reviewed and Radha was able to demonstrate them prior to the end of the session.  Radha demonstrated good  understanding of the education provided.     See EMR under Patient Instructions for exercises provided prior visit.    Assessment   Pt needs moderate cueing to promote mid trap activation. This carried over well into standing walk out activities.  Range of Motion is great for time frame. Pt had 120 AAROM with pulleys, this improves to  135 after SL Range of Motion activity. Progress as tolerated     Radha Is progressing well towards her goals.   Pt prognosis is Excellent.     Pt will continue to benefit from skilled outpatient physical therapy to address the deficits listed in the problem list box on initial evaluation, provide pt/family education and to maximize pt's level of independence in the home and community environment.     Pt's spiritual, cultural and educational needs considered and pt agreeable to plan of care and goals.    Anticipated barriers to physical therapy: none    Goals:  Short Term Goals: 2-12 weeks   Pt will be able to demonstrate >110 deg of PROM flexion and ABD.  Pt will be able to demonstrate > 45 deg PROM ER.  Pt will be able to demonstrate full PROM of RUE..  Pt will be able to demonstrate IR and ER Yellow Theraband x10 no pain.  Pt will be able to demonstrate AAROM table slide x20.  Pt will be able to demonstrate lift 1# to 120 deg AROM flexion.     Long Term Goals: 12-24 weeks   Pt will be able to demonstrate full AROM or RUE.  Pt will be able to demonstrate push pull 30# sled.  Pt will be able to demonstrate  lift 5# OH single arm  Pt will be able to demonstrate 30# OH double arm  Pt will subjectively report satisfactory completion of all ADLs, recreation, and work duties.     Plan     Improve PROM/AROM, strengthening per protocol, pattern functional activity neuro-muscular control for PLOF    Haseeb Walls, PT, DPT

## 2023-04-18 ENCOUNTER — HOSPITAL ENCOUNTER (OUTPATIENT)
Dept: RADIOLOGY | Facility: HOSPITAL | Age: 58
Discharge: HOME | End: 2023-04-18
Attending: PHYSICIAN ASSISTANT
Payer: MEDICAID

## 2023-04-18 ENCOUNTER — OFFICE VISIT (OUTPATIENT)
Dept: PULMONOLOGY | Facility: CLINIC | Age: 58
End: 2023-04-18
Payer: MEDICAID

## 2023-04-18 VITALS
SYSTOLIC BLOOD PRESSURE: 126 MMHG | DIASTOLIC BLOOD PRESSURE: 80 MMHG | HEART RATE: 99 BPM | BODY MASS INDEX: 23.38 KG/M2 | WEIGHT: 140.31 LBS | OXYGEN SATURATION: 93 % | RESPIRATION RATE: 18 BRPM | HEIGHT: 65 IN

## 2023-04-18 DIAGNOSIS — R13.19 OTHER DYSPHAGIA: ICD-10-CM

## 2023-04-18 DIAGNOSIS — R91.8 PULMONARY NODULES/LESIONS, MULTIPLE: ICD-10-CM

## 2023-04-18 DIAGNOSIS — Z72.0 TOBACCO ABUSE: ICD-10-CM

## 2023-04-18 DIAGNOSIS — G47.09 OTHER INSOMNIA: ICD-10-CM

## 2023-04-18 DIAGNOSIS — F17.218 CIGARETTE NICOTINE DEPENDENCE WITH OTHER NICOTINE-INDUCED DISORDER: ICD-10-CM

## 2023-04-18 DIAGNOSIS — G47.30 SLEEP-DISORDERED BREATHING: ICD-10-CM

## 2023-04-18 DIAGNOSIS — J41.0 SIMPLE CHRONIC BRONCHITIS: Primary | ICD-10-CM

## 2023-04-18 PROCEDURE — 71271 CT THORAX LUNG CANCER SCR C-: CPT | Mod: 26,,, | Performed by: RADIOLOGY

## 2023-04-18 PROCEDURE — 1159F MED LIST DOCD IN RCRD: CPT | Mod: CPTII,,, | Performed by: PHYSICIAN ASSISTANT

## 2023-04-18 PROCEDURE — 3008F PR BODY MASS INDEX (BMI) DOCUMENTED: ICD-10-PCS | Mod: CPTII,,, | Performed by: PHYSICIAN ASSISTANT

## 2023-04-18 PROCEDURE — 3008F BODY MASS INDEX DOCD: CPT | Mod: CPTII,,, | Performed by: PHYSICIAN ASSISTANT

## 2023-04-18 PROCEDURE — 99999 PR PBB SHADOW E&M-EST. PATIENT-LVL V: ICD-10-PCS | Mod: PBBFAC,,, | Performed by: PHYSICIAN ASSISTANT

## 2023-04-18 PROCEDURE — 71271 CT CHEST LUNG SCREENING LOW DOSE: ICD-10-PCS | Mod: 26,,, | Performed by: RADIOLOGY

## 2023-04-18 PROCEDURE — 1160F PR REVIEW ALL MEDS BY PRESCRIBER/CLIN PHARMACIST DOCUMENTED: ICD-10-PCS | Mod: CPTII,,, | Performed by: PHYSICIAN ASSISTANT

## 2023-04-18 PROCEDURE — 3074F SYST BP LT 130 MM HG: CPT | Mod: CPTII,,, | Performed by: PHYSICIAN ASSISTANT

## 2023-04-18 PROCEDURE — 1159F PR MEDICATION LIST DOCUMENTED IN MEDICAL RECORD: ICD-10-PCS | Mod: CPTII,,, | Performed by: PHYSICIAN ASSISTANT

## 2023-04-18 PROCEDURE — 99214 PR OFFICE/OUTPT VISIT, EST, LEVL IV, 30-39 MIN: ICD-10-PCS | Mod: S$PBB,,, | Performed by: PHYSICIAN ASSISTANT

## 2023-04-18 PROCEDURE — 99999 PR PBB SHADOW E&M-EST. PATIENT-LVL V: CPT | Mod: PBBFAC,,, | Performed by: PHYSICIAN ASSISTANT

## 2023-04-18 PROCEDURE — 99214 OFFICE O/P EST MOD 30 MIN: CPT | Mod: S$PBB,,, | Performed by: PHYSICIAN ASSISTANT

## 2023-04-18 PROCEDURE — 1160F RVW MEDS BY RX/DR IN RCRD: CPT | Mod: CPTII,,, | Performed by: PHYSICIAN ASSISTANT

## 2023-04-18 PROCEDURE — 3079F DIAST BP 80-89 MM HG: CPT | Mod: CPTII,,, | Performed by: PHYSICIAN ASSISTANT

## 2023-04-18 PROCEDURE — 99215 OFFICE O/P EST HI 40 MIN: CPT | Mod: PBBFAC,25 | Performed by: PHYSICIAN ASSISTANT

## 2023-04-18 PROCEDURE — 3074F PR MOST RECENT SYSTOLIC BLOOD PRESSURE < 130 MM HG: ICD-10-PCS | Mod: CPTII,,, | Performed by: PHYSICIAN ASSISTANT

## 2023-04-18 PROCEDURE — 3079F PR MOST RECENT DIASTOLIC BLOOD PRESSURE 80-89 MM HG: ICD-10-PCS | Mod: CPTII,,, | Performed by: PHYSICIAN ASSISTANT

## 2023-04-18 PROCEDURE — 71271 CT THORAX LUNG CANCER SCR C-: CPT | Mod: TC

## 2023-04-18 NOTE — PROGRESS NOTES
"Subjective:       Patient ID: Radha Ramachandran is a 57 y.o. female.    Chief Complaint: COPD    4/18/2023  Here for COPD follow up  Taking spiriva daily, albuterol as needed - she feels well controlled on this regimen  No recent exacerbations  Last visit 1/2023 for pre-op shoulder surgery  Denies post operative complications  No signs of infection today  mrC 1-2  Completed LDCT today - benign appearing    COPD Questionnaire  How often do you cough?: Some of the time  How often do you have phlegm (mucus) in your chest?: Never  How often does your chest feel tight?: All of the time  When you walk up a hill or one flight of stairs, how often are you breathless?: All of the time  How often are you limited doing any activities at home?: Some of the time  How often are you confident leaving the house despite your lung condition?: All of the time  How often do you sleep soundly?: Never  How often do you have energy?: Some of the time  Total score: 23    Complains today of trouble sleeping  Gets in bed 8:30pm, trouble falling asleep, attributes this to stress  Takes 1.5 hours to fall asleep, this has been a problem for about a year  Wakes up frequently, snores  "Cannot stop racing thoughts"  Does not take sleep aids  Only has caffeine in the morning  No alcohol  Wakes up unrefreshed  Has daytime fatigue  Has trouble swallowing, feels like things get stuck in her throat  Denies voice changes    BP Readings from Last 3 Encounters:   04/18/23 126/80   02/09/23 (!) 112/56   01/23/23 130/68     Snoring / Sleep:     [place] Questionnaire (validated MACEY screening questionnaire)    yes -- Snoring/apnea    yes -- Fatigue    Body mass index is 23.35 kg/m².  (>25 is overweight, >30 is obese)    Blood Pressure = normal blood pressure  (PreHTN 120-139/80-89, Stg1 140-159/90-99, Stg2 >160/>100)  [place] = 2 of three MACEY categories are positive (high risk is 2-3 positive categories)     [place] Sleepiness Scale TOTAL =   10  (validated " sleepiness questionnaire with a higher score indicating greater sleepiness; range 0-24)    STOP-Bang Questionnaire (validated MACEY screening questionnaire)  Negative unless checked off.  [x] Snoring    [x]  Tired/Fatigued/Sleepy  [x] Obstruction (apneas/choking)  [] Pressure (HTN)  [] BMI >35  [x] Age >50  [] Neck >40 cm  [] Gender male   STOP-Bang = 4 (low risk 0-2,high risk 3-8)    Neck circumference 14inches [?MACEY risk if >43cm (17in) male or >41cm (15.5 in) female]    1/2023  56yo female here for pulmonary risk assessment visit  History of COPD on Spiriva  Current smoker, had cut down to a pack a day  Scheduled for right rotator cuff repair with Dr. Chau 2/9/23  Had covid 1/3-1/11/23, has some residual fatigue  Cough and congestion have resolved  No fever or chest pain  SOB on exertion at baseline, uses albuterol inhaler as needed which relives her shortness of breath, has used it three times this month  Attributes some of her shortness of breath to anxiety  No history of post operative complications      Immunization History   Administered Date(s) Administered    COVID-19, MRNA, LN-S, PF (Pfizer) (Purple Cap) 04/01/2021, 04/23/2021    Influenza - Quadrivalent 08/27/2015    Influenza - Quadrivalent - MDCK 10/19/2021    Influenza - Quadrivalent - PF *Preferred* (6 months and older) 09/21/2016, 02/13/2019    Pneumococcal Conjugate - 20 Valent 01/23/2023    Tdap 04/28/2020    Zoster 03/21/2016, 03/25/2016      Tobacco Use: High Risk    Smoking Tobacco Use: Every Day    Smokeless Tobacco Use: Never    Passive Exposure: Not on file      Past Medical History:   Diagnosis Date    COPD with asthma     Digestive disorder     Fibromyalgia     H/O psoriatic arthritis     Hx of migraines     Post-traumatic osteoarthritis of left shoulder 12/15/2020    Rheumatoid arthritis       Current Outpatient Medications on File Prior to Visit   Medication Sig Dispense Refill    acetaminophen (TYLENOL) 500 MG tablet Take 2 tablets  (1,000 mg total) by mouth every 8 (eight) hours as needed for Pain. 60 tablet 0    albuterol (PROVENTIL/VENTOLIN HFA) 90 mcg/actuation inhaler INHALE 1 PUFF EVERY 4 HOURS AS NEEDED 18 g 2    ALBUTEROL INHL Inhale into the lungs.      cefdinir (OMNICEF) 300 MG capsule Take 300 mg by mouth once daily.      diclofenac sodium (VOLTAREN) 1 % Gel Apply 2 g topically 4 (four) times daily. 2 g 2    EScitalopram oxalate (LEXAPRO) 20 MG tablet Take 20 mg by mouth once daily.      famotidine (PEPCID) 40 MG tablet Take 40 mg by mouth 2 (two) times daily.      fluticasone propionate (FLONASE) 50 mcg/actuation nasal spray 1 spray by Each Nostril route once daily.      levocetirizine (XYZAL) 5 MG tablet Take 5 mg by mouth every evening.      lidocaine-prilocaine (EMLA) cream       methocarbamoL (ROBAXIN) 500 MG Tab TAKE 1 TABLET (500 MG TOTAL) BY MOUTH NIGHTLY AS NEEDED (MUSCLE SPASMS OR PAIN). 30 tablet 0    naproxen (NAPROSYN) 500 MG tablet Take 1 tablet (500 mg total) by mouth 2 (two) times daily. 60 tablet 2    nitrofurantoin, macrocrystal-monohydrate, (MACROBID) 100 MG capsule Take 100 mg by mouth 2 (two) times daily.      ondansetron (ZOFRAN-ODT) 4 MG TbDL Take 4 mg by mouth every 8 (eight) hours as needed.      SPIRIVA WITH HANDIHALER 18 mcg inhalation capsule SMARTSI Puff(s) Via Inhaler Daily      traMADoL (ULTRAM) 50 mg tablet Take 1 tablet (50 mg total) by mouth every 8 (eight) hours as needed for Pain. 18 tablet 0    aspirin (ECOTRIN) 81 MG EC tablet Take 1 tablet (81 mg total) by mouth 2 (two) times a day. for 14 days 28 tablet 0     Current Facility-Administered Medications on File Prior to Visit   Medication Dose Route Frequency Provider Last Rate Last Admin    lactated ringers infusion   Intravenous Continuous Caprice Yin MD   New Bag at 21 1402    methacholine chloride 0 mg/3 mL (0 mg/mL) nebulizer solution - white vial 3 mL  3 mL Nebulization Once Brendan Saenz MD        methacholine chloride  "0.1875 mg/3 mL (0.0625 mg/mL) nebulizer solution - red vial 0.1875 mg  3 mL Nebulization Once Brendan Saenz MD        methacholine chloride 0.75 mg/3 mL (0.25 mg/mL) nebulizer solution - orange vial 0.75 mg  3 mL Nebulization Once Brendan Saenz MD        methacholine chloride 12 mg/3 mL (4 mg/mL) nebulizer solution - green vial 12 mg  3 mL Nebulization Once Brendan Saenz MD        methacholine chloride 3 mg/3 mL (1 mg/mL) nebulizer solution - yellow vial 3 mg  3 mL Nebulization Once Brendan Saenz MD        methacholine chloride 48 mg/3 mL (16 mg/mL) nebulizer solution - blue vial 48 mg  3 mL Nebulization Once Brendan Saenz MD            Review of Systems   Constitutional:  Negative for fever, weight loss, appetite change, fatigue and weakness.   HENT:  Negative for postnasal drip, rhinorrhea, sinus pressure, trouble swallowing and congestion.    Respiratory:  Positive for snoring, shortness of breath, dyspnea on extertion and somnolence. Negative for cough, sputum production, choking, chest tightness and wheezing.    Cardiovascular:  Negative for chest pain and leg swelling.   Musculoskeletal:  Negative for arthralgias, gait problem and joint swelling.   Gastrointestinal:  Negative for nausea, vomiting and abdominal pain.   Neurological:  Negative for dizziness, weakness and headaches.   Psychiatric/Behavioral:  Positive for sleep disturbance.    All other systems reviewed and are negative.    Objective:       Vitals:    04/18/23 1439   BP: 126/80   Pulse: 99   Resp: 18   SpO2: (!) 93%   Weight: 63.6 kg (140 lb 5.2 oz)   Height: 5' 5" (1.651 m)         Physical Exam   Constitutional: She is oriented to person, place, and time. She appears well-developed and well-nourished. No distress.   HENT:   Head: Normocephalic.   Mouth/Throat: Oropharynx is clear and moist. Mallampati Score: II.   Cardiovascular: Normal rate and regular rhythm.   Pulmonary/Chest: Effort normal. No respiratory " distress. She has no wheezes. She has no rhonchi. She has no rales.   Musculoskeletal:         General: No edema.      Cervical back: Normal range of motion and neck supple.   Lymphadenopathy: No supraclavicular adenopathy is present.     She has no cervical adenopathy.   Neurological: She is alert and oriented to person, place, and time. Gait normal.   Skin: Skin is warm and dry.   Psychiatric: She has a normal mood and affect.   Vitals reviewed.  Personal Diagnostic Review    CT Chest Lung Screening Low Dose  Narrative: EXAMINATION:  CT CHEST LUNG SCREENING LOW DOSE    CLINICAL HISTORY:  Lung cancer screening, >= 20 pk-yr smoking history, risk factor(s) (Age >= 50y); Nicotine dependence, cigarettes, with other nicotine-induced disorders    TECHNIQUE:  CT of the thorax was performed with low dose, lung screening protocol.  No contrast was administered.  Sagittal and coronal reconstructions were obtained.    COMPARISON:  04/14/2022.    FINDINGS:  Lungs: Multiple small nodular opacities in the bilateral lungs remain unchanged.  Largest lesion in the right middle lobe has an average diameter of 4.8 mm on series 4, image 341.  No suspicious new or enlarging nodules are identified.  The lungs show no findings consistent with emphysema.    Pleura: No effusion.    Heart and pericardium: Normal size without effusion.    Aorta and vasculature: Atherosclerosis including coronary arteries.    Chest wall and skeletal structures: Age-appropriate degenerative changes.  Left shoulder arthroplasty.    Upper abdomen: Cholecystectomy.  Impression: Lung-RADS Category:  2 - Benign Appearance or Behavior - continue annual screening with LDCT in 12 months.    Clinically or potentially clinically significant non lung cancer finding:  None.    Prior Lung Cancer Modifier:  No history of prior lung cancer.    Electronically signed by: SONALI Terrazas MD  Date:    04/18/2023  Time:    14:29      No results for input(s): PH, PCO2, PO2,  HCO3, POCSATURATED, BE in the last 72 hours.      1/2023: Spirometry is normal. Spirometry remains unimproved following bronchodilator.        Assessment/Plan:       Problem List Items Addressed This Visit          Pulmonary    COPD (chronic obstructive pulmonary disease) - Primary     Continue spiriva daily  Albuterol as needed  Nii normal  ABG with chronic respiratory alkalosis  Stressed smoking cessation           Pulmonary nodules/lesions, multiple     Stable 4/2023; follow up LDCT 4/2024  Shared medical decision regarding low-dose screening CT chest agreeable to proceed with low-dose CT chest.             Relevant Orders    CT Chest Lung Screening Low Dose       GI    Other dysphagia    Relevant Orders    Ambulatory referral/consult to Gastroenterology       Other    Tobacco abuse     Assistance with smoking cessation was offered, including:  []  Medications  [x]  Counseling  []  Printed Information on Smoking Cessation  [x]  Referral to a Smoking Cessation Program    Patient was counseled regarding smoking for 3-10 minutes.         Sleep-disordered breathing     STOPbang 4, [place] 2, Ep 10  Home sleep study  MACEY and implications on health discussed  Follow up in 4 weeks after sleep study           Relevant Orders    Home Sleep Study    Other insomnia     Sleep hygiene education  MACEY rule out  Denies restless legs              Follow up in about 4 weeks (around 5/16/2023) for sleep study review 4 weeks; LDCT in 1 year.    Discussed diagnosis, its evaluation, treatment and usual course. All questions answered.    Patient verbalized understanding of plan and left in no acute distress    Thank you for the courtesy of participating in the care of this patient    GERARDO [facility]

## 2023-04-19 ENCOUNTER — TELEPHONE (OUTPATIENT)
Dept: SLEEP MEDICINE | Facility: CLINIC | Age: 58
End: 2023-04-19
Payer: MEDICAID

## 2023-04-19 PROBLEM — G47.09 OTHER INSOMNIA: Status: ACTIVE | Noted: 2023-04-19

## 2023-04-19 PROBLEM — G47.30 SLEEP-DISORDERED BREATHING: Status: ACTIVE | Noted: 2023-04-19

## 2023-04-19 PROBLEM — R13.19 OTHER DYSPHAGIA: Status: ACTIVE | Noted: 2023-04-19

## 2023-04-19 NOTE — ASSESSMENT & PLAN NOTE
[place] 4, [place] 2, Ep 10  Home sleep study  MACEY and implications on health discussed  Follow up in 4 weeks after sleep study

## 2023-04-19 NOTE — ASSESSMENT & PLAN NOTE
Continue spiriva daily  Albuterol as needed  [place] normal  ABG with chronic respiratory alkalosis  Stressed smoking cessation

## 2023-04-19 NOTE — ASSESSMENT & PLAN NOTE
Stable 4/2023; follow up LDCT 4/2024  Shared medical decision regarding low-dose screening CT chest agreeable to proceed with low-dose CT chest.

## 2023-04-20 ENCOUNTER — CLINICAL SUPPORT (OUTPATIENT)
Dept: REHABILITATION | Facility: HOSPITAL | Age: 58
End: 2023-04-20
Attending: STUDENT IN AN ORGANIZED HEALTH CARE EDUCATION/TRAINING PROGRAM
Payer: MEDICAID

## 2023-04-20 DIAGNOSIS — M25.511 CHRONIC RIGHT SHOULDER PAIN: Primary | ICD-10-CM

## 2023-04-20 DIAGNOSIS — G89.29 CHRONIC RIGHT SHOULDER PAIN: Primary | ICD-10-CM

## 2023-04-20 PROCEDURE — 97112 NEUROMUSCULAR REEDUCATION: CPT | Mod: PO

## 2023-04-20 NOTE — PROGRESS NOTES
"  Physical Therapy Daily Treatment Note     Name: Radha Ramachandran  Clinic Number: [id number]    Therapy Diagnosis:   Encounter Diagnosis   Name Primary?    Chronic right shoulder pain Yes       Physician: Ramesh Chau*    Visit Date: 4/20/2023  Physician Orders: PT Eval and Treat  Medical Diagnosis from Referral: S/P right rotator cuff repair [Z98.890]  Evaluation Date: 2/20/2023  Authorization Period Expiration: 1/25/2023 - 1/25/2024  Plan of Care Expiration: 8-7-2023  Visit # / Visits authorized: 6/25    FOTO 1st: 32%  FOTO 5th:  FOTO 10th:     Time In: 0909  Time Out: 0930  Total Billable Time: 21 minutes (Audrey Bloom PTA completed latter portion of session)    Precautions: Standard    DOS: 2/9/23  POD: 10 wks    Subjective     Pt reports: no significant changes. "It feels better every day."    She was compliant with home exercise program.  Response to previous treatment: progressing  Functional change: progressing     Pain: 2/10  Location: right shoulder      Objective     Passive Range of Motion:   Shoulder Right  Left    Flexion 150 135   Abduction 130 140   ER  65 75   IR 50 25       Treatment   Treatment time:  **billed per medicaid guidelines**    Radha received therapeutic exercises  Patient education on posture, exercise and form  AAROM 4x 10   ER/IR/ADD/ABD isometrics 50% effort  Pulleys 3x 1'  Row 0 deg ABD walk outs 4x 10  Standing A walk outs 4x 10     NMC:  Rhythmic stabilization 90 deg flexion 5x 30s    Not today  Shoulder abduction heat 5 min  Ext isos 5x10s  Seated wrist flex/ext, pro/sup 1# 3x15  Seated scap retractions 2x15, 2s iso    Following lead by Haseeb Walls, PT, DPT _    Radha received the following manual therapy techniques:   PROM ABD/Flexion/ ER/IR  per protocol  SL flexion with scap upwd rot  SL flexion with inf glide (135 deg)      Radha participated in neuromuscular re-education     Prone mid trap re-training   Prone scap retraction 2x10, 5s iso  Prone modified Y  " 2x10, 3s iso    Not today    Radha participated in dynamic functional therapeutic activities to improve functional performance        Home Exercises Provided and Patient Education Provided     Education provided:   - patient education on activities and precautions     Written Home Exercises Provided: Patient instructed to cont prior HEP.  Exercises were reviewed and Radha was able to demonstrate them prior to the end of the session.  Radha demonstrated good  understanding of the education provided.     See EMR under Patient Instructions for exercises provided prior visit.    Assessment   Good carryover of periscapular control noted during prone exercise. Patient required cueing ~40% of the time. Passive right shoulder flexion improved to 150 deg without pain. Continue to progress as tolerated.     Radha Is progressing well towards her goals.   Pt prognosis is Excellent.     Pt will continue to benefit from skilled outpatient physical therapy to address the deficits listed in the problem list box on initial evaluation, provide pt/family education and to maximize pt's level of independence in the home and community environment.     Pt's spiritual, cultural and educational needs considered and pt agreeable to plan of care and goals.    Anticipated barriers to physical therapy: none    Goals:  Short Term Goals: 2-12 weeks   Pt will be able to demonstrate >110 deg of PROM flexion and ABD.  Pt will be able to demonstrate > 45 deg PROM ER.  Pt will be able to demonstrate full PROM of RUE..  Pt will be able to demonstrate IR and ER Yellow Theraband x10 no pain.  Pt will be able to demonstrate AAROM table slide x20.  Pt will be able to demonstrate lift 1# to 120 deg AROM flexion.     Long Term Goals: 12-24 weeks   Pt will be able to demonstrate full AROM or RUE.  Pt will be able to demonstrate push pull 30# sled.  Pt will be able to demonstrate lift 5# OH single arm  Pt will be able to demonstrate 30# OH double arm  Pt will  subjectively report satisfactory completion of all ADLs, recreation, and work duties.     Plan     Improve PROM/AROM, strengthening per protocol, pattern functional activity neuro-muscular control for PLOF    Haseeb Walls, PT, DPT

## 2023-05-05 ENCOUNTER — TELEPHONE (OUTPATIENT)
Dept: ORTHOPEDICS | Facility: CLINIC | Age: 58
End: 2023-05-05
Payer: MEDICAID

## 2023-05-05 NOTE — TELEPHONE ENCOUNTER
Spoke with patient and got her rescheduled to 5/30 as she has another visit on this day and travels far. Patient was grateful for the call. -NS    ----- Message from Rob Fan sent at 5/5/2023  3:06 PM CDT -----  Contact: Radha lovei we can see her at the end of May.      ----- Message -----  From: Alana Kirkland MA  Sent: 5/5/2023   2:59 PM CDT  To: Rob Fan    I forgot to ask if he'd be okay with her coming in when she has another visit scheduled (late May) or if she needs to be seen before then.    ----- Message -----  From: Angle Su  Sent: 5/5/2023   9:19 AM CDT  To: [facility] Staff    Type:  Sooner Apoointment Request    Caller is requesting a sooner appointment. Caller will not accept being placed on the waitlist and is requesting a message be sent to doctor.  Name of Caller:Radha  When is the first available appointment?scheduled 5/5/23  Symptoms:Post-op visit  Would the patient rather a call back or a response via [facility]? call  Best Call Back Number:[phone]  Additional Information: Patient reports currently sick and request to reschedule post-op visit for a later date. Please give patient a call back to assist.  Thank you,  GH

## 2023-05-16 DIAGNOSIS — M17.12 PRIMARY OSTEOARTHRITIS OF LEFT KNEE: ICD-10-CM

## 2023-05-17 RX ORDER — METHOCARBAMOL 500 MG/1
TABLET, FILM COATED ORAL
Qty: 30 TABLET | Refills: 0 | Status: SHIPPED | OUTPATIENT
Start: 2023-05-17 | End: 2023-06-12

## 2023-05-28 NOTE — PROGRESS NOTES
Orthopaedics  Post-operative follow-up    Procedure Performed:  1. Right shoulder arthroscopic rotator cuff repair, including subscapularis  2. Right shoulder limited debridement     Date of Surgery: 2/9/23    Subjective: Radha Ramachandran is now approximately 3.5 months out from her shoulder surgery.  She has been compliant with post-operative restrictions and has been progressing with PT at [facility].  Her pain and function continue to improve. She is happy with her progress thus far.     Exam:  Incision sites healed, C/D/I  No swelling or bruising noted  Fluid ROM with no crepitus  Upright AAROM: , , ER 60  Axillary nerve sensation and motor intact  Motor and sensory intact distally  Strong radial pulse, fingers warm and well perfused    Imaging:  No new imaging.     Impression:  3.5 months s/p right shoulder arthroscopic rotator cuff repair, including subscapularis, and limited debridement, third post-operative visit - doing well    Plan:  She continues to make progress with her pain and function. Discussed that I am happy with her progress thus far and she is right where I expect her to be.    Advance PT per protocol  Symptomatic treatment for pain / swelling  May advance activities as reviewed today: Continue to progress strength and endurance of shoulder and periscapular musculature. As she progresses with strength then can transition back to full activity over the next 6-12 weeks   Instructed patient to call clinic if questions or concerns    Follow-up with me as needed.     Work status:  No lifting more than 10lbs with right arm  No overhead work          Ramesh Chau MD    I, Rob Fan, acted as a scribe for Ramesh Chau MD for the duration of this office visit.

## 2023-05-29 ENCOUNTER — TELEPHONE (OUTPATIENT)
Dept: PULMONOLOGY | Facility: CLINIC | Age: 58
End: 2023-05-29
Payer: MEDICAID

## 2023-05-30 ENCOUNTER — OFFICE VISIT (OUTPATIENT)
Dept: SPORTS MEDICINE | Facility: CLINIC | Age: 58
End: 2023-05-30
Payer: MEDICAID

## 2023-05-30 ENCOUNTER — OFFICE VISIT (OUTPATIENT)
Dept: PODIATRY | Facility: CLINIC | Age: 58
End: 2023-05-30
Payer: MEDICAID

## 2023-05-30 VITALS — WEIGHT: 140 LBS | HEIGHT: 65 IN | BODY MASS INDEX: 23.32 KG/M2

## 2023-05-30 VITALS — BODY MASS INDEX: 23.32 KG/M2 | WEIGHT: 140 LBS | HEIGHT: 65 IN

## 2023-05-30 DIAGNOSIS — G89.29 CHRONIC RADICULAR PAIN OF LOWER EXTREMITY: ICD-10-CM

## 2023-05-30 DIAGNOSIS — M54.10 CHRONIC RADICULAR PAIN OF LOWER EXTREMITY: ICD-10-CM

## 2023-05-30 DIAGNOSIS — M54.12 CERVICAL RADICULOPATHY: Primary | ICD-10-CM

## 2023-05-30 DIAGNOSIS — Z98.890 STATUS POST RIGHT ROTATOR CUFF REPAIR: Primary | ICD-10-CM

## 2023-05-30 DIAGNOSIS — M54.16 LUMBAR RADICULOPATHY: ICD-10-CM

## 2023-05-30 DIAGNOSIS — M21.6X9 CAVUS FOOT, ACQUIRED: ICD-10-CM

## 2023-05-30 PROCEDURE — 99999 PR PBB SHADOW E&M-EST. PATIENT-LVL IV: CPT | Mod: PBBFAC,,, | Performed by: STUDENT IN AN ORGANIZED HEALTH CARE EDUCATION/TRAINING PROGRAM

## 2023-05-30 PROCEDURE — 1159F PR MEDICATION LIST DOCUMENTED IN MEDICAL RECORD: ICD-10-PCS | Mod: CPTII,,, | Performed by: STUDENT IN AN ORGANIZED HEALTH CARE EDUCATION/TRAINING PROGRAM

## 2023-05-30 PROCEDURE — 99213 OFFICE O/P EST LOW 20 MIN: CPT | Mod: S$PBB,,, | Performed by: STUDENT IN AN ORGANIZED HEALTH CARE EDUCATION/TRAINING PROGRAM

## 2023-05-30 PROCEDURE — 99213 PR OFFICE/OUTPT VISIT, EST, LEVL III, 20-29 MIN: ICD-10-PCS | Mod: S$PBB,,, | Performed by: STUDENT IN AN ORGANIZED HEALTH CARE EDUCATION/TRAINING PROGRAM

## 2023-05-30 PROCEDURE — 1160F RVW MEDS BY RX/DR IN RCRD: CPT | Mod: CPTII,,, | Performed by: STUDENT IN AN ORGANIZED HEALTH CARE EDUCATION/TRAINING PROGRAM

## 2023-05-30 PROCEDURE — 1159F MED LIST DOCD IN RCRD: CPT | Mod: CPTII,,, | Performed by: STUDENT IN AN ORGANIZED HEALTH CARE EDUCATION/TRAINING PROGRAM

## 2023-05-30 PROCEDURE — 99214 OFFICE O/P EST MOD 30 MIN: CPT | Mod: PBBFAC,27 | Performed by: PODIATRIST

## 2023-05-30 PROCEDURE — 99999 PR PBB SHADOW E&M-EST. PATIENT-LVL IV: ICD-10-PCS | Mod: PBBFAC,,, | Performed by: PODIATRIST

## 2023-05-30 PROCEDURE — 99999 PR PBB SHADOW E&M-EST. PATIENT-LVL IV: ICD-10-PCS | Mod: PBBFAC,,, | Performed by: STUDENT IN AN ORGANIZED HEALTH CARE EDUCATION/TRAINING PROGRAM

## 2023-05-30 PROCEDURE — 3008F PR BODY MASS INDEX (BMI) DOCUMENTED: ICD-10-PCS | Mod: CPTII,,, | Performed by: PODIATRIST

## 2023-05-30 PROCEDURE — 3008F PR BODY MASS INDEX (BMI) DOCUMENTED: ICD-10-PCS | Mod: CPTII,,, | Performed by: STUDENT IN AN ORGANIZED HEALTH CARE EDUCATION/TRAINING PROGRAM

## 2023-05-30 PROCEDURE — 99214 OFFICE O/P EST MOD 30 MIN: CPT | Mod: S$PBB,,, | Performed by: PODIATRIST

## 2023-05-30 PROCEDURE — 99999 PR PBB SHADOW E&M-EST. PATIENT-LVL IV: CPT | Mod: PBBFAC,,, | Performed by: PODIATRIST

## 2023-05-30 PROCEDURE — 1160F PR REVIEW ALL MEDS BY PRESCRIBER/CLIN PHARMACIST DOCUMENTED: ICD-10-PCS | Mod: CPTII,,, | Performed by: STUDENT IN AN ORGANIZED HEALTH CARE EDUCATION/TRAINING PROGRAM

## 2023-05-30 PROCEDURE — 3008F BODY MASS INDEX DOCD: CPT | Mod: CPTII,,, | Performed by: STUDENT IN AN ORGANIZED HEALTH CARE EDUCATION/TRAINING PROGRAM

## 2023-05-30 PROCEDURE — 99214 OFFICE O/P EST MOD 30 MIN: CPT | Mod: PBBFAC | Performed by: STUDENT IN AN ORGANIZED HEALTH CARE EDUCATION/TRAINING PROGRAM

## 2023-05-30 PROCEDURE — 99214 PR OFFICE/OUTPT VISIT, EST, LEVL IV, 30-39 MIN: ICD-10-PCS | Mod: S$PBB,,, | Performed by: PODIATRIST

## 2023-05-30 PROCEDURE — 3008F BODY MASS INDEX DOCD: CPT | Mod: CPTII,,, | Performed by: PODIATRIST

## 2023-05-30 RX ORDER — GABAPENTIN 300 MG/1
300 CAPSULE ORAL NIGHTLY
Qty: 30 CAPSULE | Refills: 11 | Status: SHIPPED | OUTPATIENT
Start: 2023-05-30 | End: 2023-09-24 | Stop reason: SDUPTHER

## 2023-05-30 NOTE — PROGRESS NOTES
PODIATRIC MEDICINE AND SURGERY  6/7/2023    PCP: Dr. Raudel Velez, FNP-C    CHIEF COMPLAINT   Chief Complaint   Patient presents with    Foot Problem     C/o crunching and popping sounds in both feet, numbness, burning and tingling, x 3 years, stands on her feet about 10 hours a day, growth at the dorsal aspect of the right foot, x 1 year, non-diabetic, wears sandals       HPI:    Radha Ramachandran is a 57 y.o. female who has a past medical history of COPD with asthma, Digestive disorder, Fibromyalgia, H/O psoriatic arthritis, migraines, Post-traumatic osteoarthritis of left shoulder (12/15/2020), and Rheumatoid arthritis.   Radha presents to clinic today complaining of burning, tingling, and numbness in both feet. Pt has history of neuropathy. Symptoms have been present for three years.   Patient denies other pedal complaints at this time.     PMH  Past Medical History:   Diagnosis Date    COPD with asthma     Digestive disorder     Fibromyalgia     H/O psoriatic arthritis     Hx of migraines     Post-traumatic osteoarthritis of left shoulder 12/15/2020    Rheumatoid arthritis        PROBLEM LIST  Patient Active Problem List    Diagnosis Date Noted    Sleep-disordered breathing 04/19/2023    Other dysphagia 04/19/2023    Other insomnia 04/19/2023    Right shoulder pain 02/20/2023    Fibromyositis 10/19/2021    COPD (chronic obstructive pulmonary disease) 10/19/2021    Pulmonary nodules/lesions, multiple 10/19/2021    Tobacco abuse 10/19/2021    Osteoarthritis 05/28/2021    Lumbar radiculopathy 03/26/2021    Carpal tunnel syndrome of left wrist 03/26/2021    Stiffness of left shoulder joint 02/08/2021    Shoulder weakness 02/08/2021    Arthritis of left glenohumeral joint 01/25/2021    Post-traumatic osteoarthritis of left shoulder 12/15/2020    Cervical radiculopathy 10/16/2020    Left shoulder pain 10/16/2020    Primary osteoarthritis of left knee 01/04/2017    Olecranon bursitis 11/20/2014    Psoriatic  arthritis 2014       MEDS  Current Outpatient Medications on File Prior to Visit   Medication Sig Dispense Refill    acetaminophen (TYLENOL) 500 MG tablet Take 2 tablets (1,000 mg total) by mouth every 8 (eight) hours as needed for Pain. 60 tablet 0    albuterol (PROVENTIL/VENTOLIN HFA) 90 mcg/actuation inhaler INHALE 1 PUFF EVERY 4 HOURS AS NEEDED 18 g 2    ALBUTEROL INHL Inhale into the lungs.      cefdinir (OMNICEF) 300 MG capsule Take 300 mg by mouth once daily.      diclofenac sodium (VOLTAREN) 1 % Gel Apply 2 g topically 4 (four) times daily. 2 g 2    EScitalopram oxalate (LEXAPRO) 20 MG tablet Take 20 mg by mouth once daily.      famotidine (PEPCID) 40 MG tablet Take 40 mg by mouth 2 (two) times daily.      fluticasone propionate (FLONASE) 50 mcg/actuation nasal spray 1 spray by Each Nostril route once daily.      levocetirizine (XYZAL) 5 MG tablet Take 5 mg by mouth every evening.      lidocaine-prilocaine (EMLA) cream       methocarbamoL (ROBAXIN) 500 MG Tab TAKE 1 TABLET BY MOUTH EVERY NIGHT AS NEEDED FOR MUSCLE SPASMS OR PAIN 30 tablet 0    naproxen (NAPROSYN) 500 MG tablet Take 1 tablet (500 mg total) by mouth 2 (two) times daily. 60 tablet 2    nitrofurantoin, macrocrystal-monohydrate, (MACROBID) 100 MG capsule Take 100 mg by mouth 2 (two) times daily.      ondansetron (ZOFRAN-ODT) 4 MG TbDL Take 4 mg by mouth every 8 (eight) hours as needed.      SPIRIVA WITH HANDIHALER 18 mcg inhalation capsule SMARTSI Puff(s) Via Inhaler Daily      traMADoL (ULTRAM) 50 mg tablet Take 1 tablet (50 mg total) by mouth every 8 (eight) hours as needed for Pain. 18 tablet 0    aspirin (ECOTRIN) 81 MG EC tablet Take 1 tablet (81 mg total) by mouth 2 (two) times a day. for 14 days 28 tablet 0     Current Facility-Administered Medications on File Prior to Visit   Medication Dose Route Frequency Provider Last Rate Last Admin    lactated ringers infusion   Intravenous Continuous Caprice Yin MD   New Bag at 21  1402    methacholine chloride 0 mg/3 mL (0 mg/mL) nebulizer solution - white vial 3 mL  3 mL Nebulization Once Brendan Saenz MD        methacholine chloride 0.1875 mg/3 mL (0.0625 mg/mL) nebulizer solution - red vial 0.1875 mg  3 mL Nebulization Once Brendan Saenz MD        methacholine chloride 0.75 mg/3 mL (0.25 mg/mL) nebulizer solution - orange vial 0.75 mg  3 mL Nebulization Once Brendan Saenz MD        methacholine chloride 12 mg/3 mL (4 mg/mL) nebulizer solution - green vial 12 mg  3 mL Nebulization Once Brendan Saenz MD        methacholine chloride 3 mg/3 mL (1 mg/mL) nebulizer solution - yellow vial 3 mg  3 mL Nebulization Once Brendan Saenz MD        methacholine chloride 48 mg/3 mL (16 mg/mL) nebulizer solution - blue vial 48 mg  3 mL Nebulization Once Brendan Saenz MD           Medication List with Changes/Refills   New Medications    GABAPENTIN (NEURONTIN) 300 MG CAPSULE    Take 1 capsule (300 mg total) by mouth every evening.   Current Medications    ACETAMINOPHEN (TYLENOL) 500 MG TABLET    Take 2 tablets (1,000 mg total) by mouth every 8 (eight) hours as needed for Pain.    ALBUTEROL (PROVENTIL/VENTOLIN HFA) 90 MCG/ACTUATION INHALER    INHALE 1 PUFF EVERY 4 HOURS AS NEEDED    ALBUTEROL INHL    Inhale into the lungs.    ASPIRIN (ECOTRIN) 81 MG EC TABLET    Take 1 tablet (81 mg total) by mouth 2 (two) times a day. for 14 days    CEFDINIR (OMNICEF) 300 MG CAPSULE    Take 300 mg by mouth once daily.    DICLOFENAC SODIUM (VOLTAREN) 1 % GEL    Apply 2 g topically 4 (four) times daily.    ESCITALOPRAM OXALATE (LEXAPRO) 20 MG TABLET    Take 20 mg by mouth once daily.    FAMOTIDINE (PEPCID) 40 MG TABLET    Take 40 mg by mouth 2 (two) times daily.    FLUTICASONE PROPIONATE (FLONASE) 50 MCG/ACTUATION NASAL SPRAY    1 spray by Each Nostril route once daily.    LEVOCETIRIZINE (XYZAL) 5 MG TABLET    Take 5 mg by mouth every evening.    LIDOCAINE-PRILOCAINE (EMLA) CREAM         "METHOCARBAMOL (ROBAXIN) 500 MG TAB    TAKE 1 TABLET BY MOUTH EVERY NIGHT AS NEEDED FOR MUSCLE SPASMS OR PAIN    NAPROXEN (NAPROSYN) 500 MG TABLET    Take 1 tablet (500 mg total) by mouth 2 (two) times daily.    NITROFURANTOIN, MACROCRYSTAL-MONOHYDRATE, (MACROBID) 100 MG CAPSULE    Take 100 mg by mouth 2 (two) times daily.    ONDANSETRON (ZOFRAN-ODT) 4 MG TBDL    Take 4 mg by mouth every 8 (eight) hours as needed.    SPIRIVA WITH HANDIHALER 18 MCG INHALATION CAPSULE    SMARTSI Puff(s) Via Inhaler Daily    TRAMADOL (ULTRAM) 50 MG TABLET    Take 1 tablet (50 mg total) by mouth every 8 (eight) hours as needed for Pain.       PSH     Past Surgical History:   Procedure Laterality Date    ANKLE FUSION Right     APPENDECTOMY      ARTHROPLASTY OF SHOULDER Left 2021    Procedure: ARTHROPLASTY, SHOULDER;  Surgeon: Ramesh Chau MD;  Location: [facility] OR;  Service: Orthopedics;  Laterality: Left;    ARTHROSCOPIC REPAIR OF ROTATOR CUFF OF SHOULDER Right 2023    Procedure: REPAIR, ROTATOR CUFF, ARTHROSCOPIC;  Surgeon: Ramesh Chau MD;  Location: [facility] OR;  Service: Orthopedics;  Laterality: Right;  Block as adjunct.   Arthrex rep    ARTHROSCOPIC TENOTOMY OF BICEPS TENDON Right 2023    Procedure: TENOTOMY, BICEPS, ARTHROSCOPIC;  Surgeon: Ramesh Chau MD;  Location: [facility] OR;  Service: Orthopedics;  Laterality: Right;    CHOLECYSTECTOMY      EYELID LACERATION REPAIR Left     HYSTERECTOMY      OPEN REDUCTION AND INTERNAL FIXATION (ORIF) OF INJURY OF HAND  right    ORIF TIBIA & FIBULA FRACTURES Right         ALL  Review of patient's allergies indicates:   Allergen Reactions    Latex, natural rubber Hives     Pt "forgot' to report allergy until rash noted on L forearm.         SOC     Social History     Tobacco Use    Smoking status: Every Day     Packs/day: 0.50     Types: Cigarettes    Smokeless tobacco: Never    Tobacco comments:     Started smoking at 15 y/o was up to 2 ppd in 2014 ; " "started cutting renzo to 1 ppd now down to < 1 ppd    Substance Use Topics    Alcohol use: Not Currently    Drug use: Never         FAMILY HX    Family History   Problem Relation Age of Onset    Heart failure Mother     Stroke Mother     Diabetes Mother     Pacemaker/defibrilator Mother     Coronary artery disease Sister     Hypertension Sister     Schizophrenia Sister     Stroke Maternal Grandmother     Asthma Maternal Grandmother     Gout Maternal Grandfather             REVIEW OF SYSTEMS  General: This patient is well-developed, well-nourished and appears stated age, well-oriented to person, place and time, and cooperative and pleasant on today's visit   Constitutional: Negative for chills and fever.   Respiratory: Negative for shortness of breath.    Cardiovascular: Negative for chest pain, palpitations, orthopnea  Gastrointestinal: Negative for diarrhea, nausea and vomiting.   Musculoskeletal: Positive for above noted in HPI  Skin: negative  for skin and/or nail changes   Neurological: positive  for tingling and sensory changes  Peripheral Vascular: no claudication or cyanosis  Psychiatric/Behavioral: Negative for altered mental status     PHYSICAL EXAM:      Vitals:    05/30/23 1425   Weight: 63.5 kg (140 lb)   Height: 5' 5" (1.651 m)   PainSc: 0-No pain         LOWER EXTREMITY PHYSICAL EXAM  VASCULAR  Dorsalis pedis and posterior tibial pulses palpable 2/4 bilaterally. Capillary refill time immediate to the toes. Feet are warm to the touch. Skin temperature warm to warm from proximally to distally There are no varicosities, telangiectasias noted to bilateral foot and ankle regions. There are no ecchymoses noted to bilateral foot and ankle regions. There is no gross lower extremity edema.    DERMATOLOGIC  Skin moist with healthy texture and turgor.There are no open ulcerations, lacerations, or fissures to bilateral foot and ankle regions. There are no signs of infection as there is no erythema, no " proximal-extending lymphangiitis, no fluctuance, or crepitus noted on palpation to bilateral foot and ankle regions. There is no interdigital maceration.   There are no hyperkeratotic lesions noted to feet. Nails are well-trimmed.    NEUROLOGIC  Epicritic sensation is intact as the patient is able to sense light touch to bilateral foot and ankle regions. Achilles and patellar deep tendon reflexes intact. Babinski reflex absent    ORTHOPEDIC/BIOMECHANICAL  No symptomatic structural abnormalities noted. Muscle strength AT/EHL/EDL/PT: 5/5; Achilles/Gastroc/Soleus: 5/5; PB/PL: 5/5 Muscle tone is normal. Ankle joint ROM non painful with DF/PF, non-crepitus; STJ ROM  Inv/ev non painful, non crepitus     TEST RESULTS: EMG reveals ABNORMAL study There is electrodiagnostic evidence of a MILD demyelinating median neuropathy (Carpal tunnel syndrome) across the LEFT wrist and a SUBACUTE on CHRONIC radiculopathy of the LEFT C6 nerve root. There is a CHRONIC radiculopathy of the L5 and S1 nerve roots-slightly worse at S1         ASSESSMENT     Encounter Diagnoses   Name Primary?    Cervical radiculopathy Yes    Lumbar radiculopathy     Chronic radicular pain of lower extremity     Cavus foot, acquired          PLAN  Patient was educated about clinical and imaging findings, and verbalizes understanding of above.     Diagnoses and all orders for this visit:  Cervical radiculopathy  -     Ambulatory referral/consult to Pain Clinic; Future; Expected date: 06/06/2023    Lumbar radiculopathy  -     Ambulatory referral/consult to Pain Clinic; Future; Expected date: 06/06/2023    Chronic radicular pain of lower extremity  -     Ambulatory referral/consult to Pain Clinic; Future; Expected date: 06/06/2023    Cavus foot, acquired    Other orders  -     gabapentin (NEURONTIN) 300 MG capsule; Take 1 capsule (300 mg total) by mouth every evening.  Dispense: 30 capsule; Refill: 11      Will refer to pain for evaluation for nerve symptomology    Will start trial gabapentin  RX shoes      Disclaimer:  This note may have been prepared using voice recognition software, it may have not been extensively proofed, as such there could be errors within the text such as sound alike errors.         Future Appointments   Date Time Provider Department Center   4/18/2024 12:30 PM [facility] CT1 LIMIT 500 LBS V CT SCAN [place]       Report Electronically Signed By:     Marilu Kennedy DPM   Podiatry  [facility]  6/7/2023

## 2023-05-30 NOTE — LETTER
May 30, 2023      [facility] [address]  [address] [facility]  [place] [address]  Phone: [phone]  Fax: [phone]       Patient: Radha Ramachandran   YOB: 1965  Date of Visit: 05/30/2023    To Whom It May Concern:    Alonzo Ramachandran  was at [facility] on 05/30/2023.     The patient may return to work on 5/31/23 with the following restrictions.   No lifting/pushing/pulling greater than 10 pounds    If you have any questions or concerns, or if I can be of further assistance, please do not hesitate to contact me.    Sincerely,      Ramesh Chau MD

## 2023-06-11 DIAGNOSIS — M17.12 PRIMARY OSTEOARTHRITIS OF LEFT KNEE: ICD-10-CM

## 2023-06-12 ENCOUNTER — TELEPHONE (OUTPATIENT)
Dept: PULMONOLOGY | Facility: CLINIC | Age: 58
End: 2023-06-12
Payer: MEDICAID

## 2023-06-12 RX ORDER — METHOCARBAMOL 500 MG/1
TABLET, FILM COATED ORAL
Qty: 30 TABLET | Refills: 2 | Status: SHIPPED | OUTPATIENT
Start: 2023-06-12 | End: 2023-09-05

## 2023-06-12 NOTE — TELEPHONE ENCOUNTER
unable to leave message ----- Message from Paige Moore sent at 6/12/2023 11:12 AM CDT -----  Contact: Yany Garcia is returning Fly's call. Please call her at [phone]

## 2023-06-12 NOTE — TELEPHONE ENCOUNTER
unable to accept calls, verified by another worker----- Message from Jo Reyes sent at 6/12/2023 10:11 AM CDT -----  Contact: Self [phone]  Would like to receive medical advice.    Would they like a call back or a response via [facility]: portal    Additional information:  Calling to speak with the nurse regarding pt received c pap machine this morning and will send results on Monday.

## 2023-06-19 PROCEDURE — 95806 PR SLEEP STUDY, UNATTENDED, SIMUL RECORD HR/O2 SAT/RESP FLOW/RESP EFFT: ICD-10-PCS | Mod: 26,52,, | Performed by: INTERNAL MEDICINE

## 2023-06-19 PROCEDURE — 95806 SLEEP STUDY UNATT&RESP EFFT: CPT | Mod: 26,52,, | Performed by: INTERNAL MEDICINE

## 2023-06-20 ENCOUNTER — HOSPITAL ENCOUNTER (OUTPATIENT)
Dept: SLEEP MEDICINE | Facility: HOSPITAL | Age: 58
Discharge: HOME | End: 2023-06-20
Attending: PHYSICIAN ASSISTANT
Payer: MEDICAID

## 2023-06-20 DIAGNOSIS — G47.30 SLEEP-DISORDERED BREATHING: ICD-10-CM

## 2023-06-20 NOTE — Clinical Note
Single night study Data for 3 hours 36 minutes Snoring was 95% SpO2 sheree was 78% Overall AHI was 5.0/hr with 18 events only PRIMARY SNORING Consider repeat testing with at least 6 hours data, or inlab Polysomnography

## 2023-06-20 NOTE — PROCEDURES
"Single night study  Data for 3 hours 36 minutes  Snoring was 95%  SpO2 sheree was 78%  Overall AHI was 5.0/hr with 18 events only  PRIMARY SNORING  Consider repeat testing with at least 6 hours data, or inlab  Polysomnography    Dear Delicia Valles, GERARDO  [address] [place] [address] [place] [id number]/Raudel Velez, FNP-C         The sleep study that you ordered is complete.  You have ordered sleep LAB services to perform the sleep study for Radha Ramachandran .      Please find Sleep Study result in  the "Media tab" of Chart Review menu.        You can look  for the report in the  Media by the document type "Sleep Study Documents". Alphabetizing  "Document type" column helps to find the SLEEP STUDY report  Faster.       As the ordering provider, you are responsible for reviewing the results and implementing a treatment plan with your patient.    If you need a Sleep Medicine provider to explain the sleep study findings and arrange treatment for the patient, please refer patient for consultation to our Sleep Clinic via [facility] with Ambulatory Consult Sleep.     To do that please place an order for an  "Ambulatory Consult Sleep" -  order , it will go to our clinic work queue for our staff  to contact the patient for an appointment.      For any questions, please contact our sleep lab  staff at [phone] to talk to clinical staff          Brendan Saenz MD   "

## 2023-06-21 ENCOUNTER — TELEPHONE (OUTPATIENT)
Dept: ORTHOPEDICS | Facility: CLINIC | Age: 58
End: 2023-06-21
Payer: MEDICAID

## 2023-06-21 NOTE — TELEPHONE ENCOUNTER
----- Message from Adelaide Alvarenga sent at 6/21/2023  4:14 PM CDT -----  Contact: Radha  Pt is calling in regards to getting an appt schedule for injection. Pt stated feeling like something hot is poured down leg in the inside of the thigh to knee area. Please call back at [phone]                            Thanks  KT

## 2023-06-21 NOTE — TELEPHONE ENCOUNTER
Spoke with patient and scheduled her an appointment for follow up. Patient verbalized understanding verbalized of appointment date, time and location.

## 2023-06-22 DIAGNOSIS — Z98.890 S/P RIGHT ROTATOR CUFF REPAIR: ICD-10-CM

## 2023-06-22 RX ORDER — NAPROXEN 500 MG/1
TABLET ORAL
Qty: 60 TABLET | Refills: 2 | Status: SHIPPED | OUTPATIENT
Start: 2023-06-22 | End: 2023-09-14

## 2023-07-03 ENCOUNTER — TELEPHONE (OUTPATIENT)
Dept: ORTHOPEDICS | Facility: CLINIC | Age: 58
End: 2023-07-03
Payer: MEDICAID

## 2023-07-20 ENCOUNTER — TELEPHONE (OUTPATIENT)
Dept: ORTHOPEDICS | Facility: CLINIC | Age: 58
End: 2023-07-20
Payer: MEDICAID

## 2023-07-20 NOTE — TELEPHONE ENCOUNTER
Returned the patient's phone call in regards to their message. Offered the patient to be seen on 07/10/23 at 7:30. Patient states that they need a Monday or Tuesday appointment. I got the patient schedule for the next available Monday appointment on 08/14/23 at 12:30. Patient verbalized understanding.               ----- Message from Qi Ospina sent at 7/20/2023  7:15 AM CDT -----  Contact: self [phone]  Patient called in this morning to reschedule her appointment, non populated. Can you get her rescheduled. Please call back [phone]. Thanks andre

## 2023-08-14 ENCOUNTER — OFFICE VISIT (OUTPATIENT)
Dept: ORTHOPEDICS | Facility: CLINIC | Age: 58
End: 2023-08-14
Payer: MEDICAID

## 2023-08-14 DIAGNOSIS — M17.12 PRIMARY OSTEOARTHRITIS OF LEFT KNEE: Primary | ICD-10-CM

## 2023-08-14 PROCEDURE — 1160F PR REVIEW ALL MEDS BY PRESCRIBER/CLIN PHARMACIST DOCUMENTED: ICD-10-PCS | Mod: CPTII,,, | Performed by: PHYSICIAN ASSISTANT

## 2023-08-14 PROCEDURE — 99213 OFFICE O/P EST LOW 20 MIN: CPT | Mod: PBBFAC | Performed by: PHYSICIAN ASSISTANT

## 2023-08-14 PROCEDURE — 20610 DRAIN/INJ JOINT/BURSA W/O US: CPT | Mod: PBBFAC,LT | Performed by: PHYSICIAN ASSISTANT

## 2023-08-14 PROCEDURE — 99213 OFFICE O/P EST LOW 20 MIN: CPT | Mod: S$PBB,25,, | Performed by: PHYSICIAN ASSISTANT

## 2023-08-14 PROCEDURE — 99999 PR PBB SHADOW E&M-EST. PATIENT-LVL III: CPT | Mod: PBBFAC,,, | Performed by: PHYSICIAN ASSISTANT

## 2023-08-14 PROCEDURE — 1159F MED LIST DOCD IN RCRD: CPT | Mod: CPTII,,, | Performed by: PHYSICIAN ASSISTANT

## 2023-08-14 PROCEDURE — 20610 LARGE JOINT ASPIRATION/INJECTION: L KNEE: ICD-10-PCS | Mod: S$PBB,LT,, | Performed by: PHYSICIAN ASSISTANT

## 2023-08-14 PROCEDURE — 20610 DRAIN/INJ JOINT/BURSA W/O US: CPT | Mod: S$PBB,LT,, | Performed by: PHYSICIAN ASSISTANT

## 2023-08-14 PROCEDURE — 1159F PR MEDICATION LIST DOCUMENTED IN MEDICAL RECORD: ICD-10-PCS | Mod: CPTII,,, | Performed by: PHYSICIAN ASSISTANT

## 2023-08-14 PROCEDURE — 99999 PR PBB SHADOW E&M-EST. PATIENT-LVL III: ICD-10-PCS | Mod: PBBFAC,,, | Performed by: PHYSICIAN ASSISTANT

## 2023-08-14 PROCEDURE — 99999PBSHW PR PBB SHADOW TECHNICAL ONLY FILED TO HB: Mod: PBBFAC,,,

## 2023-08-14 PROCEDURE — 99213 PR OFFICE/OUTPT VISIT, EST, LEVL III, 20-29 MIN: ICD-10-PCS | Mod: S$PBB,25,, | Performed by: PHYSICIAN ASSISTANT

## 2023-08-14 PROCEDURE — 99999PBSHW PR PBB SHADOW TECHNICAL ONLY FILED TO HB: ICD-10-PCS | Mod: PBBFAC,,,

## 2023-08-14 PROCEDURE — 1160F RVW MEDS BY RX/DR IN RCRD: CPT | Mod: CPTII,,, | Performed by: PHYSICIAN ASSISTANT

## 2023-08-14 RX ORDER — LIDOCAINE HYDROCHLORIDE 10 MG/ML
5 INJECTION INFILTRATION; PERINEURAL
Status: DISCONTINUED | OUTPATIENT
Start: 2023-08-14 | End: 2023-08-14 | Stop reason: HOSPADM

## 2023-08-14 RX ORDER — METHYLPREDNISOLONE ACETATE 80 MG/ML
80 INJECTION, SUSPENSION INTRA-ARTICULAR; INTRALESIONAL; INTRAMUSCULAR; SOFT TISSUE
Status: DISCONTINUED | OUTPATIENT
Start: 2023-08-14 | End: 2023-08-14 | Stop reason: HOSPADM

## 2023-08-14 RX ADMIN — LIDOCAINE HYDROCHLORIDE 5 ML: 10 INJECTION, SOLUTION INFILTRATION; PERINEURAL at 12:08

## 2023-08-14 RX ADMIN — METHYLPREDNISOLONE ACETATE 80 MG: 80 INJECTION, SUSPENSION INTRALESIONAL; INTRAMUSCULAR; INTRASYNOVIAL; SOFT TISSUE at 12:08

## 2023-08-14 NOTE — PROCEDURES
Large Joint Aspiration/Injection: L knee    Date/Time: 8/14/2023 12:30 PM    Performed by: Daly Malik PA  Authorized by: Daly Malik PA    Consent Done?:  Yes (Verbal)  Indications:  Arthritis, joint swelling and pain  Site marked: the procedure site was marked      Local anesthesia used?: Yes    Local anesthetic:  Topical anesthetic    Details:  Needle Size:  22 G  Approach:  Anterolateral  Location:  Knee  Site:  L knee  Medications:  5 mL LIDOcaine HCL 10 mg/ml (1%) 10 mg/mL (1 %); 80 mg methylPREDNISolone acetate 80 mg/mL  Patient tolerance:  Patient tolerated the procedure well with no immediate complications     Verbal consent was obtained  The patient's ID, site, side was verified  The site was sterile prepped in standard fashion  The injection was performed in the latasha-lateral side without complication  A sterile Band-Aid was applied    Patient was directed to apply ice today at roughly 15 minutes at a time as needed.  It was discussed that they may be sore for the next few days or so.  Please avoid strenuous activity over the next 24 hours.  It was also discussed that the patient may have a increase in glucose if diabetic and should monitor levels.  Patient was instructed to call as needed.

## 2023-08-14 NOTE — PROGRESS NOTES
Patient ID: Radha Ramachandran is a 58 y.o. female.    Chief Complaint: No chief complaint on file.      HPI: Radha Ramachandran  is a 58 y.o. female who c/o No chief complaint on file.       Patient presents with chief complaint of left knee pain.  Patient states pain affecting activity of daily living and thus her quality of life at times can get up to a 10/10.  She is not using any devices to assist in ambulation.  She is a hard time walking long distances, going from a sitting to standing standing to sitting position, unlevel terrain or stairs.  She last received a steroid injection 10/25/2022 by our sports Medicine Department.  She states that last for several weeks but has since worn off.    Additionally she underwent a shoulder scope 2 weeks ago and is doing well with that.    Patient is presently denying any shortness of breath, chest pain, fever/chills, nausea/vomiting, loss of taste or smell, numbness/tingling or sensation changes, loss of bladder or bowel function.    8/14/2023    Patient presents today with chief complaint of left knee pain.  Patient states pain is at worst a 10/10 affecting activity of daily living and thus her quality of life.  Patient is not using any devices to assist with ambulation nor she wearing any knee braces although she states she is having a hard time walking long distances, going from a sitting to standing standing to seated position, unlevel terrain or stairs.  The patient had a ground fall yesterday after tripping over a grandchild's Veronica sore.  She states this has been affecting her ability to ambulate even further.  The steroid injection she last received in February as well as 515 is providing additional relief.  Although this has worn off at this time.  She inquires about getting this repeated today.      Patient is presently denying any shortness of breath, chest pain, fever/chills, nausea/vomiting, loss of taste or smell, numbness/tingling or sensation changes, loss  of bladder or bowel function, loss of taste/smell.     Past Medical History:   Diagnosis Date    COPD with asthma     Digestive disorder     Fibromyalgia     H/O psoriatic arthritis     Hx of migraines     Post-traumatic osteoarthritis of left shoulder 12/15/2020    Rheumatoid arthritis        Past Surgical History:   Procedure Laterality Date    ANKLE FUSION Right     APPENDECTOMY      ARTHROPLASTY OF SHOULDER Left 1/25/2021    Procedure: ARTHROPLASTY, SHOULDER;  Surgeon: Ramesh Chau MD;  Location: [facility] OR;  Service: Orthopedics;  Laterality: Left;    ARTHROSCOPIC REPAIR OF ROTATOR CUFF OF SHOULDER Right 2/9/2023    Procedure: REPAIR, ROTATOR CUFF, ARTHROSCOPIC;  Surgeon: Ramesh Chau MD;  Location: [facility] OR;  Service: Orthopedics;  Laterality: Right;  Block as adjunct.   [employer] rep    ARTHROSCOPIC TENOTOMY OF BICEPS TENDON Right 2/9/2023    Procedure: TENOTOMY, BICEPS, ARTHROSCOPIC;  Surgeon: Ramesh Chau MD;  Location: [facility];  Service: Orthopedics;  Laterality: Right;    CHOLECYSTECTOMY      EYELID LACERATION REPAIR Left     HYSTERECTOMY      OPEN REDUCTION AND INTERNAL FIXATION (ORIF) OF INJURY OF HAND  right    ORIF TIBIA & FIBULA FRACTURES Right        Family History   Problem Relation Age of Onset    Heart failure Mother     Stroke Mother     Diabetes Mother     Pacemaker/defibrilator Mother     Coronary artery disease Sister     Hypertension Sister     Schizophrenia Sister     Stroke Maternal Grandmother     Asthma Maternal Grandmother     Gout Maternal Grandfather        Social History     Socioeconomic History    Marital status: Single   Tobacco Use    Smoking status: Every Day     Current packs/day: 0.50     Types: Cigarettes    Smokeless tobacco: Never    Tobacco comments:     Started smoking at 15 y/o was up to 2 ppd in 2014 ; started cutting renzo to 1 ppd now down to < 1 ppd    Substance and Sexual Activity    Alcohol use: Not Currently    Drug use: Never    Sexual  activity: Not Currently     Partners: Male   Social History Narrative    Live w/ family       Social Determinants of Health     Stress: Stress Concern Present (12/15/2020)    [employer] - Occupational Stress Questionnaire     Feeling of Stress : To some extent       Medication List with Changes/Refills   Current Medications    ACETAMINOPHEN (TYLENOL) 500 MG TABLET    Take 2 tablets (1,000 mg total) by mouth every 8 (eight) hours as needed for Pain.    ALBUTEROL (PROVENTIL/VENTOLIN HFA) 90 MCG/ACTUATION INHALER    INHALE 1 PUFF EVERY 4 HOURS AS NEEDED    ALBUTEROL INHL    Inhale into the lungs.    ASPIRIN (ECOTRIN) 81 MG EC TABLET    Take 1 tablet (81 mg total) by mouth 2 (two) times a day. for 14 days    CEFDINIR (OMNICEF) 300 MG CAPSULE    Take 300 mg by mouth once daily.    DICLOFENAC SODIUM (VOLTAREN) 1 % GEL    Apply 2 g topically 4 (four) times daily.    ESCITALOPRAM OXALATE (LEXAPRO) 20 MG TABLET    Take 20 mg by mouth once daily.    FAMOTIDINE (PEPCID) 40 MG TABLET    Take 40 mg by mouth 2 (two) times daily.    FLUTICASONE PROPIONATE (FLONASE) 50 MCG/ACTUATION NASAL SPRAY    1 spray by Each Nostril route once daily.    GABAPENTIN (NEURONTIN) 300 MG CAPSULE    Take 1 capsule (300 mg total) by mouth every evening.    LEVOCETIRIZINE (XYZAL) 5 MG TABLET    Take 5 mg by mouth every evening.    LIDOCAINE-PRILOCAINE (EMLA) CREAM        METHOCARBAMOL (ROBAXIN) 500 MG TAB    TAKE 1 TABLET BY MOUTH EVERY NIGHT AS NEEDED FOR MUSCLE SPASMS OR PAIN    NAPROXEN (NAPROSYN) 500 MG TABLET    TAKE 1 TABLET BY MOUTH TWICE A DAY    NITROFURANTOIN, MACROCRYSTAL-MONOHYDRATE, (MACROBID) 100 MG CAPSULE    Take 100 mg by mouth 2 (two) times daily.    ONDANSETRON (ZOFRAN-ODT) 4 MG TBDL    Take 4 mg by mouth every 8 (eight) hours as needed.    SPIRIVA WITH HANDIHALER 18 MCG INHALATION CAPSULE    SMARTSI Puff(s) Via Inhaler Daily    TRAMADOL (ULTRAM) 50 MG TABLET    Take 1 tablet (50 mg total) by mouth  "every 8 (eight) hours as needed for Pain.       Review of patient's allergies indicates:   Allergen Reactions    Latex, natural rubber Hives     Pt "forgot' to report allergy until rash noted on L forearm.           Objective:     Left Lower Extremity    KNEE:  ROM: passive flex/ ext full  Patella midling, crepitus noted   Ligaments stable  Pain on palpation to medial and lateral as well as patella aspect  Calf NT, soft  (-) Qamar sign  DF/PF full  Wiggles toes  Sensation intact to light touch   No pitting edema appreciated   NVI  Cap refill < 2 sec    Skin warm to touch, no obvious lesion noted       IMAGING:    XRAY:  FINDINGS:      BONES: There are no fractures nor dislocations involving the left knee.  Tricompartmental osteophytes are present with total loss of the lateral tibiofemoral compartment.  No joint effusion is present.         SOFT TISSUES: No calcifications         MISC: none        Impression:    Tricompartmental osteoarthritis with total lateral compartment loss    Kellgren Delmer scale : 3     EMG:  No record on file    Assessment:       Encounter Diagnosis   Name Primary?    Primary osteoarthritis of left knee Yes          Plan:       Diagnoses and all orders for this visit:    Primary osteoarthritis of left knee  -     Large Joint Aspiration/Injection: L knee        Radha Ramachandran is a new patient who presents to me today with chief complaint of left knee pain. We had a long discussion today regarding degenerative arthritis in the knees. The patient understands that arthritis is chronic and will worsen over time.  The patient also understands that arthritis may cause episodic flare-ups in pain. Management or if arthritis is achieved through a multi-modal approach including weight loss in obese individuals, activity modification, NSAIDs (topical vs oral) where appropriate, periodic intra-articular steroid injections, viscosupplementation, physical therapy, knee bracing, ambulatory aids, as " well as geniculate nerve blocks.  The patient elected to proceed with left knee steroid injection today.  She was instructed for light activity with elevation and ice as needed.  She was told these may be repeated every 3 months.  I would like to see her back in that timeframe.  She may call with any questions or concerns in the interim.  She is to continue her current medication regimen and treatment plan.    Dr. Jiang is aware of the patient & current presentation. He agrees with the current plan above.     Patient verbalized understanding of all instructions and agreed with the above plan.    No follow-ups on file.    The patient understands, chooses and consents to this plan and accepts all   the risks which include but are not limited to the risks mentioned above.     Disclaimer: This note was prepared using a voice recognition system and is likely to have sound alike errors within the text.

## 2023-09-02 DIAGNOSIS — M17.12 PRIMARY OSTEOARTHRITIS OF LEFT KNEE: ICD-10-CM

## 2023-09-05 RX ORDER — METHOCARBAMOL 500 MG/1
TABLET, FILM COATED ORAL
Qty: 30 TABLET | Refills: 2 | Status: SHIPPED | OUTPATIENT
Start: 2023-09-05 | End: 2024-01-02

## 2023-09-11 DIAGNOSIS — Z98.890 S/P RIGHT ROTATOR CUFF REPAIR: ICD-10-CM

## 2023-09-14 RX ORDER — NAPROXEN 500 MG/1
TABLET ORAL
Qty: 60 TABLET | Refills: 2 | Status: SHIPPED | OUTPATIENT
Start: 2023-09-14

## 2023-09-21 ENCOUNTER — PATIENT MESSAGE (OUTPATIENT)
Dept: PODIATRY | Facility: CLINIC | Age: 58
End: 2023-09-21
Payer: MEDICAID

## 2023-09-26 RX ORDER — GABAPENTIN 300 MG/1
300 CAPSULE ORAL NIGHTLY
Qty: 30 CAPSULE | Refills: 11 | Status: SHIPPED | OUTPATIENT
Start: 2023-09-26 | End: 2024-09-25

## 2023-12-04 ENCOUNTER — PROCEDURE VISIT (OUTPATIENT)
Dept: ORTHOPEDICS | Facility: CLINIC | Age: 58
End: 2023-12-04
Payer: MEDICAID

## 2023-12-04 VITALS
WEIGHT: 135 LBS | HEIGHT: 65 IN | BODY MASS INDEX: 22.49 KG/M2 | DIASTOLIC BLOOD PRESSURE: 88 MMHG | SYSTOLIC BLOOD PRESSURE: 131 MMHG

## 2023-12-04 DIAGNOSIS — M17.12 PRIMARY OSTEOARTHRITIS OF LEFT KNEE: Primary | ICD-10-CM

## 2023-12-04 PROCEDURE — 99999PBSHW PR PBB SHADOW TECHNICAL ONLY FILED TO HB: Mod: PBBFAC,,,

## 2023-12-04 PROCEDURE — 99213 OFFICE O/P EST LOW 20 MIN: CPT | Mod: S$PBB,,, | Performed by: PHYSICIAN ASSISTANT

## 2023-12-04 PROCEDURE — 99213 PR OFFICE/OUTPT VISIT, EST, LEVL III, 20-29 MIN: ICD-10-PCS | Mod: S$PBB,,, | Performed by: PHYSICIAN ASSISTANT

## 2023-12-04 PROCEDURE — 99999PBSHW PR PBB SHADOW TECHNICAL ONLY FILED TO HB: ICD-10-PCS | Mod: PBBFAC,,,

## 2023-12-04 RX ORDER — LIDOCAINE HYDROCHLORIDE 10 MG/ML
5 INJECTION INFILTRATION; PERINEURAL
Status: DISCONTINUED | OUTPATIENT
Start: 2023-12-04 | End: 2023-12-04 | Stop reason: HOSPADM

## 2023-12-04 RX ORDER — METHYLPREDNISOLONE ACETATE 80 MG/ML
80 INJECTION, SUSPENSION INTRA-ARTICULAR; INTRALESIONAL; INTRAMUSCULAR; SOFT TISSUE
Status: DISCONTINUED | OUTPATIENT
Start: 2023-12-04 | End: 2023-12-04 | Stop reason: HOSPADM

## 2023-12-04 RX ADMIN — LIDOCAINE HYDROCHLORIDE 5 ML: 10 INJECTION, SOLUTION INFILTRATION; PERINEURAL at 09:12

## 2023-12-04 RX ADMIN — METHYLPREDNISOLONE ACETATE 80 MG: 80 INJECTION, SUSPENSION INTRALESIONAL; INTRAMUSCULAR; INTRASYNOVIAL; SOFT TISSUE at 09:12

## 2023-12-04 NOTE — PROCEDURES
Large Joint Aspiration/Injection    Date/Time: 12/4/2023 9:45 AM    Performed by: Daly Malik PA  Authorized by: Daly Malik PA    Consent Done?:  Yes (Verbal)  Indications:  Arthritis, joint swelling and pain  Site marked: the procedure site was marked      Local anesthesia used?: Yes    Local anesthetic:  Topical anesthetic    Details:  Needle Size:  22 G  Approach:  Anterolateral  Medications:  5 mL LIDOcaine HCL 10 mg/ml (1%) 10 mg/mL (1 %); 80 mg methylPREDNISolone acetate 80 mg/mL  Patient tolerance:  Patient tolerated the procedure well with no immediate complications     Verbal consent was obtained  The patient's ID, site, side was verified  The site was sterile prepped in standard fashion  The injection was performed in the latasha-lateral side without complication  A sterile Band-Aid was applied    Patient was directed to apply ice today at roughly 15 minutes at a time as needed.  It was discussed that they may be sore for the next few days or so.  Please avoid strenuous activity over the next 24 hours.  It was also discussed that the patient may have a increase in glucose if diabetic and should monitor levels.  Patient was instructed to call as needed.

## 2023-12-31 DIAGNOSIS — M17.12 PRIMARY OSTEOARTHRITIS OF LEFT KNEE: ICD-10-CM

## 2024-01-02 RX ORDER — METHOCARBAMOL 500 MG/1
TABLET, FILM COATED ORAL
Qty: 30 TABLET | Refills: 2 | Status: SHIPPED | OUTPATIENT
Start: 2024-01-02

## 2024-03-04 ENCOUNTER — PROCEDURE VISIT (OUTPATIENT)
Dept: ORTHOPEDICS | Facility: CLINIC | Age: 59
End: 2024-03-04
Payer: MEDICAID

## 2024-03-04 VITALS — HEIGHT: 65 IN | WEIGHT: 130 LBS | BODY MASS INDEX: 21.66 KG/M2

## 2024-03-04 DIAGNOSIS — M17.12 PRIMARY OSTEOARTHRITIS OF LEFT KNEE: Primary | ICD-10-CM

## 2024-03-04 PROCEDURE — 20610 DRAIN/INJ JOINT/BURSA W/O US: CPT | Mod: S$PBB,LT,, | Performed by: PHYSICIAN ASSISTANT

## 2024-03-04 PROCEDURE — 99999PBSHW PR PBB SHADOW TECHNICAL ONLY FILED TO HB: Mod: JZ,PBBFAC,,

## 2024-03-04 PROCEDURE — 99499 UNLISTED E&M SERVICE: CPT | Mod: S$PBB,,, | Performed by: PHYSICIAN ASSISTANT

## 2024-03-04 PROCEDURE — 20610 DRAIN/INJ JOINT/BURSA W/O US: CPT | Mod: PBBFAC | Performed by: PHYSICIAN ASSISTANT

## 2024-03-04 RX ADMIN — Medication 60 MG: at 09:03

## 2024-03-04 NOTE — PROCEDURES
Large Joint Aspiration/Injection: L knee    Date/Time: 3/4/2024 9:45 AM    Performed by: Daly Malik PA  Authorized by: Daly Malik PA    Consent Done?:  Yes (Verbal)  Indications:  Arthritis and joint swelling  Site marked: the procedure site was marked      Local anesthesia used?: Yes    Local anesthetic:  Topical anesthetic    Details:  Needle Size:  21 G  Approach:  Anterolateral  Location:  Knee  Site:  L knee  Medications:  60 mg hyaluronate sodium, stabilized (DUROLANE) 60 mg/3 mL  Patient tolerance:  Patient tolerated the procedure well with no immediate complications     Verbal consent was obtained  The patient's ID, site, side was verified  The site was sterile prepped in standard fashion  The injection was performed in the latasha-lateral side without complication  A sterile Band-Aid was applied    Patient was directed to apply ice today at roughly 15 minutes at a time as needed.  It was discussed that they may be sore for the next few days or so.  Please avoid strenuous activity over the next 24 hours.  It was also discussed that the patient may have a increase in glucose if diabetic and should monitor levels.  Patient was instructed to call as needed.

## 2024-03-07 ENCOUNTER — PATIENT MESSAGE (OUTPATIENT)
Dept: ORTHOPEDICS | Facility: CLINIC | Age: 59
End: 2024-03-07
Payer: MEDICAID

## 2024-04-03 ENCOUNTER — PATIENT MESSAGE (OUTPATIENT)
Dept: PULMONOLOGY | Facility: CLINIC | Age: 59
End: 2024-04-03
Payer: MEDICAID

## 2024-04-04 ENCOUNTER — TELEPHONE (OUTPATIENT)
Dept: ORTHOPEDICS | Facility: CLINIC | Age: 59
End: 2024-04-04
Payer: MEDICAID

## 2024-04-04 NOTE — TELEPHONE ENCOUNTER
----- Message from Augustus Benton MA sent at 4/4/2024 12:01 PM CDT -----  The patient calling to reschedule appointments scheduled in June and September. Says she can only nichole Monday-Wednesday. Would like the office to just reschedule to one of those day and send her a message on the portal letting her know.

## 2024-05-02 ENCOUNTER — OFFICE VISIT (OUTPATIENT)
Dept: PODIATRY | Facility: CLINIC | Age: 59
End: 2024-05-02
Payer: MEDICAID

## 2024-05-02 VITALS — HEIGHT: 65 IN | BODY MASS INDEX: 21.67 KG/M2 | WEIGHT: 130.06 LBS

## 2024-05-02 DIAGNOSIS — M54.16 LUMBAR RADICULOPATHY: ICD-10-CM

## 2024-05-02 DIAGNOSIS — M54.10 CHRONIC RADICULAR PAIN OF LOWER EXTREMITY: ICD-10-CM

## 2024-05-02 DIAGNOSIS — G89.29 CHRONIC RADICULAR PAIN OF LOWER EXTREMITY: ICD-10-CM

## 2024-05-02 DIAGNOSIS — M54.12 CERVICAL RADICULOPATHY: Primary | ICD-10-CM

## 2024-05-02 PROCEDURE — 99214 OFFICE O/P EST MOD 30 MIN: CPT | Mod: S$PBB,,, | Performed by: PODIATRIST

## 2024-05-02 PROCEDURE — 99999 PR PBB SHADOW E&M-EST. PATIENT-LVL IV: CPT | Mod: PBBFAC,,, | Performed by: PODIATRIST

## 2024-05-02 PROCEDURE — 3008F BODY MASS INDEX DOCD: CPT | Mod: CPTII,,, | Performed by: PODIATRIST

## 2024-05-02 PROCEDURE — 1159F MED LIST DOCD IN RCRD: CPT | Mod: CPTII,,, | Performed by: PODIATRIST

## 2024-05-02 PROCEDURE — 99214 OFFICE O/P EST MOD 30 MIN: CPT | Mod: PBBFAC | Performed by: PODIATRIST

## 2024-05-02 NOTE — PROGRESS NOTES
"    Podiatry Department    Patient ID: Radha Ramachandran is a 58 y.o. female.    Chief Complaint: Foot Problem (C/o burning at the plantar aspect of both feet, currently taking gabapentin 300 mg once daily, rates discomfort 10/10, non-diabetic, wears sandals)    History of Present Illness    CHIEF COMPLAINT:  Patient presents today with foot pain, tingling, and numbness.    FOOT PAIN AND NUMBNESS:  After working seven consecutive days, patient noticed heaviness in her legs, likened to a "hot feeling", along with numbness and tingling sensations in both feet and legs. The pain extends to the whole leg, including the thigh. She is managing these symptoms with gabapentin.    MEDICAL HISTORY:  Patient has a history of back problems and nerve issues with imaging revealing significant anatomical alignment discrepancies in her legs and hips.    CURRENT MEDICATIONS:  She uses gabapentin for her foot pain and has recently started taking magnesium supplements.    WEIGHT MANAGEMENT:  She reports unexplained weight loss, from approximately 133-134 lbs to 122 lbs in less than three months. She has reduced her consumption of carbonated drinks as a dietary modification.    ROS:  General: +weight loss  Neurological: +numbness, +tingling            Physical Exam                Diagnoses:  1. Cervical radiculopathy  -     Ambulatory referral/consult to Pain Clinic; Future; Expected date: 05/09/2024    2. Lumbar radiculopathy  Overview:  chronic at L5, S1    Orders:  -     Ambulatory referral/consult to Pain Clinic; Future; Expected date: 05/09/2024    3. Chronic radicular pain of lower extremity  -     Ambulatory referral/consult to Pain Clinic; Future; Expected date: 05/09/2024        Assessment & Plan    PAIN MANAGEMENT REFERRAL:  - Reissued a referral for the patient to see a pain management specialist.  - Educated the patient about the referral process and the importance of answering all calls within the next 2 weeks to ensure " they do not miss the call from the pain management department.  - Advised the patient to expect a call within 2 weeks and to call the department directly to schedule an appointment if they do not receive a call within the specified timeframe.  - Suggested that the patient could proactively call the pain management department to expedite the appointment process.  UNEXPLAINED WEIGHT LOSS:  - Acknowledged the patient's concerns about unexplained weight loss.  - Recommend addressing these issues with the patient's primary care physician.              Future Appointments   Date Time Provider Department Center   6/10/2024  2:00 PM Daly Malik, PA [facility]   9/9/2024 11:00 AM Daly Malik, PA [facility]        This note was generated with the assistance of ambient listening technology. Verbal consent was obtained by the patient and accompanying visitor(s) for the recording of patient appointment to facilitate this note. I attest to having reviewed and edited the generated note for accuracy, though some syntax or spelling errors may persist. Please contact the author of this note for any clarification.      Report Electronically Signed By:     Marilu Kennedy DPM   Podiatry  [facility]  5/9/2024

## 2024-06-10 ENCOUNTER — OFFICE VISIT (OUTPATIENT)
Dept: ORTHOPEDICS | Facility: CLINIC | Age: 59
End: 2024-06-10
Payer: MEDICAID

## 2024-06-10 VITALS — BODY MASS INDEX: 21.67 KG/M2 | WEIGHT: 130.06 LBS | HEIGHT: 65 IN

## 2024-06-10 DIAGNOSIS — M17.12 PRIMARY OSTEOARTHRITIS OF LEFT KNEE: Primary | ICD-10-CM

## 2024-06-10 PROCEDURE — 1159F MED LIST DOCD IN RCRD: CPT | Mod: CPTII,,, | Performed by: PHYSICIAN ASSISTANT

## 2024-06-10 PROCEDURE — 3008F BODY MASS INDEX DOCD: CPT | Mod: CPTII,,, | Performed by: PHYSICIAN ASSISTANT

## 2024-06-10 PROCEDURE — 1160F RVW MEDS BY RX/DR IN RCRD: CPT | Mod: CPTII,,, | Performed by: PHYSICIAN ASSISTANT

## 2024-06-10 PROCEDURE — 99999 PR PBB SHADOW E&M-EST. PATIENT-LVL IV: CPT | Mod: PBBFAC,,, | Performed by: PHYSICIAN ASSISTANT

## 2024-06-10 PROCEDURE — 99999PBSHW PR PBB SHADOW TECHNICAL ONLY FILED TO HB: Mod: PBBFAC,,,

## 2024-06-10 PROCEDURE — 20610 DRAIN/INJ JOINT/BURSA W/O US: CPT | Mod: PBBFAC | Performed by: PHYSICIAN ASSISTANT

## 2024-06-10 PROCEDURE — 20610 DRAIN/INJ JOINT/BURSA W/O US: CPT | Mod: S$PBB,LT,, | Performed by: PHYSICIAN ASSISTANT

## 2024-06-10 PROCEDURE — 99214 OFFICE O/P EST MOD 30 MIN: CPT | Mod: S$PBB,25,, | Performed by: PHYSICIAN ASSISTANT

## 2024-06-10 PROCEDURE — 99214 OFFICE O/P EST MOD 30 MIN: CPT | Mod: PBBFAC | Performed by: PHYSICIAN ASSISTANT

## 2024-06-10 RX ORDER — METHOCARBAMOL 500 MG/1
500 TABLET, FILM COATED ORAL 3 TIMES DAILY
Qty: 90 TABLET | Refills: 2 | Status: SHIPPED | OUTPATIENT
Start: 2024-06-10

## 2024-06-10 RX ORDER — METHYLPREDNISOLONE ACETATE 80 MG/ML
80 INJECTION, SUSPENSION INTRA-ARTICULAR; INTRALESIONAL; INTRAMUSCULAR; SOFT TISSUE
Status: DISCONTINUED | OUTPATIENT
Start: 2024-06-10 | End: 2024-06-10 | Stop reason: HOSPADM

## 2024-06-10 RX ORDER — LIDOCAINE HYDROCHLORIDE 10 MG/ML
5 INJECTION INFILTRATION; PERINEURAL
Status: DISCONTINUED | OUTPATIENT
Start: 2024-06-10 | End: 2024-06-10 | Stop reason: HOSPADM

## 2024-06-10 RX ADMIN — METHYLPREDNISOLONE ACETATE 80 MG: 80 INJECTION, SUSPENSION INTRALESIONAL; INTRAMUSCULAR; INTRASYNOVIAL; SOFT TISSUE at 02:06

## 2024-06-10 RX ADMIN — LIDOCAINE HYDROCHLORIDE 5 ML: 10 INJECTION, SOLUTION INFILTRATION; PERINEURAL at 02:06

## 2024-06-10 NOTE — PROGRESS NOTES
Patient ID: Rdaha Ramachandran is a 58 y.o. female.    Chief Complaint: Pain of the Left Knee      HPI: Radha Ramachandran  is a 58 y.o. female who c/o Pain of the Left Knee       Patient presents with chief complaint of left knee pain.  Patient states pain affecting activity of daily living and thus her quality of life at times can get up to a 10/10.  She is not using any devices to assist in ambulation.  She is a hard time walking long distances, going from a sitting to standing standing to sitting position, unlevel terrain or stairs.  She last received a steroid injection 10/25/2022 by our sports Medicine Department.  She states that last for several weeks but has since worn off.    Additionally she underwent a shoulder scope 2 weeks ago and is doing well with that.    Patient is presently denying any shortness of breath, chest pain, fever/chills, nausea/vomiting, loss of taste or smell, numbness/tingling or sensation changes, loss of bladder or bowel function.    8/14/2023    Patient presents today with chief complaint of left knee pain.  Patient states pain is at worst a 10/10 affecting activity of daily living and thus her quality of life.  Patient is not using any devices to assist with ambulation nor she wearing any knee braces although she states she is having a hard time walking long distances, going from a sitting to standing standing to seated position, unlevel terrain or stairs.  The patient had a ground fall yesterday after tripping over a grandchild's Veronica sore.  She states this has been affecting her ability to ambulate even further.  The steroid injection she last received in February as well as 515 is providing additional relief.  Although this has worn off at this time.  She inquires about getting this repeated today.      Patient is presently denying any shortness of breath, chest pain, fever/chills, nausea/vomiting, loss of taste or smell, numbness/tingling or sensation changes, loss of bladder  or bowel function, loss of taste/smell.     6/10/2024    Patient presents for follow-up left-sided knee pain.  She last received early March of 2024.  She states she finds the Durolane is not as effective and requests something else be given.  We did discuss that she has due for a short-acting steroid today.  She states she finds steroid works better.  I did discuss with her that it is a long-acting steroid in the form of Zilretta that I will try to get approved for her today.  She has not using any devices to assist with ambulation nor is she wearing any knee braces.  She inquires about a refill of her Robaxin as she states this does provide additional relief but she has run out.  I will provide this for her today.      Patient is presently denying any shortness of breath, chest pain, fever/chills, nausea/vomiting, loss of taste or smell, numbness/tingling or sensation changes, loss of bladder or bowel function, loss of taste/smell.     Past Medical History:   Diagnosis Date    COPD with asthma     Digestive disorder     Fibromyalgia     H/O psoriatic arthritis     Hx of migraines     Post-traumatic osteoarthritis of left shoulder 12/15/2020    Rheumatoid arthritis        Past Surgical History:   Procedure Laterality Date    ANKLE FUSION Right     APPENDECTOMY      ARTHROPLASTY OF SHOULDER Left 1/25/2021    Procedure: ARTHROPLASTY, SHOULDER;  Surgeon: Ramesh Chau MD;  Location: [facility] OR;  Service: Orthopedics;  Laterality: Left;    ARTHROSCOPIC REPAIR OF ROTATOR CUFF OF SHOULDER Right 2/9/2023    Procedure: REPAIR, ROTATOR CUFF, ARTHROSCOPIC;  Surgeon: Ramesh Chau MD;  Location: [facility] OR;  Service: Orthopedics;  Laterality: Right;  Block as adjunct.   [employer] rep    ARTHROSCOPIC TENOTOMY OF BICEPS TENDON Right 2/9/2023    Procedure: TENOTOMY, BICEPS, ARTHROSCOPIC;  Surgeon: Ramesh Chau MD;  Location: [facility] OR;  Service: Orthopedics;  Laterality: Right;    CHOLECYSTECTOMY       EYELID LACERATION REPAIR Left     HYSTERECTOMY      OPEN REDUCTION AND INTERNAL FIXATION (ORIF) OF INJURY OF HAND  right    ORIF TIBIA & FIBULA FRACTURES Right        Family History   Problem Relation Name Age of Onset    Heart failure Mother      Stroke Mother      Diabetes Mother      Pacemaker/defibrilator Mother      Coronary artery disease Sister      Hypertension Sister      Schizophrenia Sister      Stroke Maternal Grandmother      Asthma Maternal Grandmother      Gout Maternal Grandfather         Social History     Socioeconomic History    Marital status: Single   Tobacco Use    Smoking status: Every Day     Current packs/day: 0.50     Types: Cigarettes    Smokeless tobacco: Never    Tobacco comments:     Started smoking at 15 y/o was up to 2 ppd in 2014 ; started cutting renzo to 1 ppd now down to < 1 ppd    Substance and Sexual Activity    Alcohol use: Not Currently    Drug use: Never    Sexual activity: Not Currently     Partners: Male   Social History Narrative    Live w/ family       Social Determinants of Health     Stress: Stress Concern Present (12/15/2020)    [employer] - Occupational Stress Questionnaire     Feeling of Stress : To some extent       Medication List with Changes/Refills   Current Medications    ACETAMINOPHEN (TYLENOL) 500 MG TABLET    Take 2 tablets (1,000 mg total) by mouth every 8 (eight) hours as needed for Pain.    ALBUTEROL (PROVENTIL/VENTOLIN HFA) 90 MCG/ACTUATION INHALER    INHALE 1 PUFF EVERY 4 HOURS AS NEEDED    ALBUTEROL INHL    Inhale into the lungs.    ASPIRIN (ECOTRIN) 81 MG EC TABLET    Take 1 tablet (81 mg total) by mouth 2 (two) times a day. for 14 days    CEFDINIR (OMNICEF) 300 MG CAPSULE    Take 300 mg by mouth once daily.    DICLOFENAC SODIUM (VOLTAREN) 1 % GEL    Apply 2 g topically 4 (four) times daily.    ESCITALOPRAM OXALATE (LEXAPRO) 20 MG TABLET    Take 20 mg by mouth once daily.    FAMOTIDINE (PEPCID) 40 MG TABLET    Take 40 mg by  "mouth 2 (two) times daily.    FLUTICASONE PROPIONATE (FLONASE) 50 MCG/ACTUATION NASAL SPRAY    1 spray by Each Nostril route once daily.    GABAPENTIN (NEURONTIN) 300 MG CAPSULE    Take 1 capsule (300 mg total) by mouth every evening.    GABAPENTIN (NEURONTIN) 300 MG CAPSULE    Take 1 capsule (300 mg total) by mouth every evening.    LEVOCETIRIZINE (XYZAL) 5 MG TABLET    Take 5 mg by mouth every evening.    LIDOCAINE-PRILOCAINE (EMLA) CREAM        METHOCARBAMOL (ROBAXIN) 500 MG TAB    TAKE 1 TABLET BY MOUTH EVERY NIGHT AS NEEDED FOR MUSCLE SPASMS OR PAIN    NAPROXEN (NAPROSYN) 500 MG TABLET    TAKE 1 TABLET BY MOUTH TWICE A DAY    NITROFURANTOIN, MACROCRYSTAL-MONOHYDRATE, (MACROBID) 100 MG CAPSULE    Take 100 mg by mouth 2 (two) times daily.    ONDANSETRON (ZOFRAN-ODT) 4 MG TBDL    Take 4 mg by mouth every 8 (eight) hours as needed.    SPIRIVA WITH HANDIHALER 18 MCG INHALATION CAPSULE    SMARTSI Puff(s) Via Inhaler Daily    TRAMADOL (ULTRAM) 50 MG TABLET    Take 1 tablet (50 mg total) by mouth every 8 (eight) hours as needed for Pain.       Review of patient's allergies indicates:   Allergen Reactions    Latex, natural rubber Hives     Pt "forgot' to report allergy until rash noted on L forearm.           Objective:     Left Lower Extremity    KNEE:  ROM: passive flex/ ext full  Patella midling, crepitus noted   Ligaments stable  Pain on palpation to medial and lateral as well as patella aspect  Calf NT, soft  (-) Qamar sign  DF/PF full  Wiggles toes  Sensation intact to light touch   No pitting edema appreciated   NVI  Cap refill < 2 sec    Skin warm to touch, no obvious lesion noted       IMAGING:    XRAY:  FINDINGS:      BONES: There are no fractures nor dislocations involving the left knee.  Tricompartmental osteophytes are present with total loss of the lateral tibiofemoral compartment.  No joint effusion is present.         SOFT TISSUES: No calcifications         MISC: none        Impression:    " Tricompartmental osteoarthritis with total lateral compartment loss    Kellgren Delmer scale : 3     EMG:  No record on file    Assessment:       Encounter Diagnosis   Name Primary?    Primary osteoarthritis of left knee Yes          Plan:       Radha was seen today for pain.    Diagnoses and all orders for this visit:    Primary osteoarthritis of left knee  -     Large Joint Aspiration/Injection: L knee        Radha Ramachandran is a new patient who presents to me today with chief complaint of left knee pain. We had a long discussion today regarding degenerative arthritis in the knees. The patient understands that arthritis is chronic and will worsen over time.  The patient also understands that arthritis may cause episodic flare-ups in pain. Management or if arthritis is achieved through a multi-modal approach including weight loss in obese individuals, activity modification, NSAIDs (topical vs oral) where appropriate, periodic intra-articular steroid injections, viscosupplementation, physical therapy, knee bracing, ambulatory aids, as well as geniculate nerve blocks.  The patient elected to proceed with left knee steroid injection today.  She was instructed for light activity with elevation and ice as needed.  She was told these may be repeated every 3 months.  I would like to see her back in that timeframe.  She may call with any questions or concerns in the interim.  She is to continue her current medication regimen and treatment plan.    Dr. Jiang is aware of the patient & current presentation. He agrees with the current plan above.     Patient verbalized understanding of all instructions and agreed with the above plan.    No follow-ups on file.    The patient understands, chooses and consents to this plan and accepts all   the risks which include but are not limited to the risks mentioned above.     Disclaimer: This note was prepared using a voice recognition system and is likely to have sound alike  errors within the text.

## 2024-06-10 NOTE — PROCEDURES
Large Joint Aspiration/Injection: L knee    Date/Time: 6/10/2024 2:00 PM    Performed by: Daly Malik PA  Authorized by: Daly Malik PA    Consent Done?:  Yes (Verbal)  Indications:  Arthritis, joint swelling and pain  Site marked: the procedure site was marked      Local anesthesia used?: Yes    Local anesthetic:  Topical anesthetic    Details:  Needle Size:  21 G  Approach:  Anterolateral  Location:  Knee  Site:  L knee  Medications:  5 mL LIDOcaine HCL 10 mg/ml (1%) 10 mg/mL (1 %); 80 mg methylPREDNISolone acetate 80 mg/mL  Patient tolerance:  Patient tolerated the procedure well with no immediate complications     Verbal consent was obtained  The patient's ID, site, side was verified  The site was sterile prepped in standard fashion  The injection was performed in the latasha-lateral side without complication  A sterile Band-Aid was applied    Patient was directed to apply ice today at roughly 15 minutes at a time as needed.  It was discussed that they may be sore for the next few days or so.  Please avoid strenuous activity over the next 24 hours.  It was also discussed that the patient may have a increase in glucose if diabetic and should monitor levels.  Patient was instructed to call as needed.

## 2024-09-06 DIAGNOSIS — M25.562 LEFT KNEE PAIN, UNSPECIFIED CHRONICITY: Primary | ICD-10-CM

## 2024-09-09 ENCOUNTER — HOSPITAL ENCOUNTER (OUTPATIENT)
Dept: RADIOLOGY | Facility: HOSPITAL | Age: 59
Discharge: HOME | End: 2024-09-09
Attending: PHYSICIAN ASSISTANT
Payer: MEDICAID

## 2024-09-09 ENCOUNTER — OFFICE VISIT (OUTPATIENT)
Dept: ORTHOPEDICS | Facility: CLINIC | Age: 59
End: 2024-09-09
Payer: MEDICAID

## 2024-09-09 VITALS — BODY MASS INDEX: 21.67 KG/M2 | HEIGHT: 65 IN | WEIGHT: 130.06 LBS

## 2024-09-09 DIAGNOSIS — M25.562 LEFT KNEE PAIN, UNSPECIFIED CHRONICITY: ICD-10-CM

## 2024-09-09 DIAGNOSIS — M17.12 PRIMARY OSTEOARTHRITIS OF LEFT KNEE: Primary | ICD-10-CM

## 2024-09-09 PROCEDURE — 73564 X-RAY EXAM KNEE 4 OR MORE: CPT | Mod: TC,LT

## 2024-09-09 PROCEDURE — 3008F BODY MASS INDEX DOCD: CPT | Mod: CPTII,,, | Performed by: PHYSICIAN ASSISTANT

## 2024-09-09 PROCEDURE — 73562 X-RAY EXAM OF KNEE 3: CPT | Mod: 26,RT,, | Performed by: RADIOLOGY

## 2024-09-09 PROCEDURE — 99999 PR PBB SHADOW E&M-EST. PATIENT-LVL IV: CPT | Mod: PBBFAC,,, | Performed by: PHYSICIAN ASSISTANT

## 2024-09-09 PROCEDURE — 73564 X-RAY EXAM KNEE 4 OR MORE: CPT | Mod: 26,LT,, | Performed by: RADIOLOGY

## 2024-09-09 PROCEDURE — 99214 OFFICE O/P EST MOD 30 MIN: CPT | Mod: S$PBB,,, | Performed by: PHYSICIAN ASSISTANT

## 2024-09-09 PROCEDURE — 99214 OFFICE O/P EST MOD 30 MIN: CPT | Mod: PBBFAC,25 | Performed by: PHYSICIAN ASSISTANT

## 2024-09-09 PROCEDURE — 1159F MED LIST DOCD IN RCRD: CPT | Mod: CPTII,,, | Performed by: PHYSICIAN ASSISTANT

## 2024-09-09 NOTE — PROGRESS NOTES
Patient ID: Radha Ramachandran is a 59 y.o. female.    Chief Complaint: No chief complaint on file.      HPI: Radha Ramachandran  is a 59 y.o. female who c/o No chief complaint on file.       Patient presents with chief complaint of left knee pain.  Patient states pain affecting activity of daily living and thus her quality of life at times can get up to a 10/10.  She is not using any devices to assist in ambulation.  She is a hard time walking long distances, going from a sitting to standing standing to sitting position, unlevel terrain or stairs.  She last received a steroid injection 10/25/2022 by our sports Medicine Department.  She states that last for several weeks but has since worn off.    Additionally she underwent a shoulder scope 2 weeks ago and is doing well with that.    Patient is presently denying any shortness of breath, chest pain, fever/chills, nausea/vomiting, loss of taste or smell, numbness/tingling or sensation changes, loss of bladder or bowel function.    8/14/2023    Patient presents today with chief complaint of left knee pain.  Patient states pain is at worst a 10/10 affecting activity of daily living and thus her quality of life.  Patient is not using any devices to assist with ambulation nor she wearing any knee braces although she states she is having a hard time walking long distances, going from a sitting to standing standing to seated position, unlevel terrain or stairs.  The patient had a ground fall yesterday after tripping over a grandchild's Veronica sore.  She states this has been affecting her ability to ambulate even further.  The steroid injection she last received in February as well as 515 is providing additional relief.  Although this has worn off at this time.  She inquires about getting this repeated today.      Patient is presently denying any shortness of breath, chest pain, fever/chills, nausea/vomiting, loss of taste or smell, numbness/tingling or sensation changes, loss  of bladder or bowel function, loss of taste/smell.     6/10/2024    Patient presents for follow-up left-sided knee pain.  She last received early March of 2024.  She states she finds the Durolane is not as effective and requests something else be given.  We did discuss that she has due for a short-acting steroid today.  She states she finds steroid works better.  I did discuss with her that it is a long-acting steroid in the form of Zilretta that I will try to get approved for her today.  She has not using any devices to assist with ambulation nor is she wearing any knee braces.  She inquires about a refill of her Robaxin as she states this does provide additional relief but she has run out.  I will provide this for her today.      Patient is presently denying any shortness of breath, chest pain, fever/chills, nausea/vomiting, loss of taste or smell, numbness/tingling or sensation changes, loss of bladder or bowel function, loss of taste/smell.     9/9/2024    Patient presents today for follow-up of left knee pain.  She notes the short-acting steroid she received does provide relief but does not last long.  She has been approved for Zilretta and wishes to proceed.  I am hoping this provides further relief.    Patient is presently denying any shortness of breath, chest pain, fever/chills, nausea/vomiting, loss of taste or smell, numbness/tingling or sensation changes, loss of bladder or bowel function, loss of taste/smell.     Past Medical History:   Diagnosis Date    COPD with asthma     Digestive disorder     Fibromyalgia     H/O psoriatic arthritis     Hx of migraines     Post-traumatic osteoarthritis of left shoulder 12/15/2020    Rheumatoid arthritis        Past Surgical History:   Procedure Laterality Date    ANKLE FUSION Right     APPENDECTOMY      ARTHROPLASTY OF SHOULDER Left 1/25/2021    Procedure: ARTHROPLASTY, SHOULDER;  Surgeon: Ramesh Chau MD;  Location: [facility];  Service: Orthopedics;   Laterality: Left;    ARTHROSCOPIC REPAIR OF ROTATOR CUFF OF SHOULDER Right 2/9/2023    Procedure: REPAIR, ROTATOR CUFF, ARTHROSCOPIC;  Surgeon: Ramesh Chau MD;  Location: [facility] OR;  Service: Orthopedics;  Laterality: Right;  Block as adjunct.   [employer] rep    ARTHROSCOPIC TENOTOMY OF BICEPS TENDON Right 2/9/2023    Procedure: TENOTOMY, BICEPS, ARTHROSCOPIC;  Surgeon: Ramesh Chau MD;  Location: [facility] OR;  Service: Orthopedics;  Laterality: Right;    CHOLECYSTECTOMY      EYELID LACERATION REPAIR Left     HYSTERECTOMY      OPEN REDUCTION AND INTERNAL FIXATION (ORIF) OF INJURY OF HAND  right    ORIF TIBIA & FIBULA FRACTURES Right        Family History   Problem Relation Name Age of Onset    Heart failure Mother      Stroke Mother      Diabetes Mother      Pacemaker/defibrilator Mother      Coronary artery disease Sister      Hypertension Sister      Schizophrenia Sister      Stroke Maternal Grandmother      Asthma Maternal Grandmother      Gout Maternal Grandfather         Social History     Socioeconomic History    Marital status: Single   Tobacco Use    Smoking status: Every Day     Current packs/day: 0.50     Types: Cigarettes    Smokeless tobacco: Never    Tobacco comments:     Started smoking at 15 y/o was up to 2 ppd in 2014 ; started cutting renzo to 1 ppd now down to < 1 ppd    Substance and Sexual Activity    Alcohol use: Not Currently    Drug use: Never    Sexual activity: Not Currently     Partners: Male   Social History Narrative    Live w/ family       Social Determinants of Health     Stress: Stress Concern Present (12/15/2020)    [employer] [place] [employer] - Occupational Stress Questionnaire     Feeling of Stress : To some extent       Medication List with Changes/Refills   Current Medications    ACETAMINOPHEN (TYLENOL) 500 MG TABLET    Take 2 tablets (1,000 mg total) by mouth every 8 (eight) hours as needed for Pain.    ALBUTEROL (PROVENTIL/VENTOLIN HFA) 90  "MCG/ACTUATION INHALER    INHALE 1 PUFF EVERY 4 HOURS AS NEEDED    ALBUTEROL INHL    Inhale into the lungs.    ASPIRIN (ECOTRIN) 81 MG EC TABLET    Take 1 tablet (81 mg total) by mouth 2 (two) times a day. for 14 days    CEFDINIR (OMNICEF) 300 MG CAPSULE    Take 300 mg by mouth once daily.    DICLOFENAC SODIUM (VOLTAREN) 1 % GEL    Apply 2 g topically 4 (four) times daily.    ESCITALOPRAM OXALATE (LEXAPRO) 20 MG TABLET    Take 20 mg by mouth once daily.    FAMOTIDINE (PEPCID) 40 MG TABLET    Take 40 mg by mouth 2 (two) times daily.    FLUTICASONE PROPIONATE (FLONASE) 50 MCG/ACTUATION NASAL SPRAY    1 spray by Each Nostril route once daily.    GABAPENTIN (NEURONTIN) 300 MG CAPSULE    Take 1 capsule (300 mg total) by mouth every evening.    GABAPENTIN (NEURONTIN) 300 MG CAPSULE    Take 1 capsule (300 mg total) by mouth every evening.    LEVOCETIRIZINE (XYZAL) 5 MG TABLET    Take 5 mg by mouth every evening.    LIDOCAINE-PRILOCAINE (EMLA) CREAM        METHOCARBAMOL (ROBAXIN) 500 MG TAB    Take 1 tablet (500 mg total) by mouth 3 (three) times daily.    NAPROXEN (NAPROSYN) 500 MG TABLET    TAKE 1 TABLET BY MOUTH TWICE A DAY    NITROFURANTOIN, MACROCRYSTAL-MONOHYDRATE, (MACROBID) 100 MG CAPSULE    Take 100 mg by mouth 2 (two) times daily.    ONDANSETRON (ZOFRAN-ODT) 4 MG TBDL    Take 4 mg by mouth every 8 (eight) hours as needed.    SPIRIVA WITH HANDIHALER 18 MCG INHALATION CAPSULE    SMARTSI Puff(s) Via Inhaler Daily    TRAMADOL (ULTRAM) 50 MG TABLET    Take 1 tablet (50 mg total) by mouth every 8 (eight) hours as needed for Pain.       Review of patient's allergies indicates:   Allergen Reactions    Latex, natural rubber Hives     Pt "forgot' to report allergy until rash noted on L forearm.           Objective:     Left Lower Extremity    KNEE:  ROM: passive flex/ ext full  Patella midling, crepitus noted   Ligaments stable  Pain on palpation to medial and lateral as well as patella aspect  Calf NT, soft  (-) Qamar " sign  DF/PF full  Wiggles toes  Sensation intact to light touch   No pitting edema appreciated   NVI  Cap refill < 2 sec    Skin warm to touch, no obvious lesion noted       IMAGING:    XRAY:  FINDINGS:  There is significant joint space narrowing and mild-to-moderate osteophyte formation seen involving the lateral compartment of the left knee.  Mild degenerative change also seen involving the medial compartment of either knee.  Moderate degenerative change seen at the left patellofemoral compartment.  No joint effusion.  No acute fracture or dislocation.     Impression:     1.  As above    Kellgren Delmer scale : 3     EMG:  No record on file    Assessment:       Encounter Diagnosis   Name Primary?    Primary osteoarthritis of left knee Yes          Plan:       Diagnoses and all orders for this visit:    Primary osteoarthritis of left knee        Radha Ramachandran is a new patient who presents to me today with chief complaint of left knee pain. We had a long discussion today regarding degenerative arthritis in the knees. The patient understands that arthritis is chronic and will worsen over time.  The patient also understands that arthritis may cause episodic flare-ups in pain. Management or if arthritis is achieved through a multi-modal approach including weight loss in obese individuals, activity modification, NSAIDs (topical vs oral) where appropriate, periodic intra-articular steroid injections, viscosupplementation, physical therapy, knee bracing, ambulatory aids, as well as geniculate nerve blocks.  The patient elected to proceed with left Zilretta injection today.  She was instructed for light activity with elevation and ice as needed.  She was told these may be repeated every 6 months.  I would like to see her back in that timeframe.  She may call with any questions or concerns in the interim.  She is to continue her current medication regimen and treatment plan.    Dr. Jiang is aware of the patient &  current presentation. He agrees with the current plan above.     Patient verbalized understanding of all instructions and agreed with the above plan.    No follow-ups on file.    The patient understands, chooses and consents to this plan and accepts all   the risks which include but are not limited to the risks mentioned above.     Disclaimer: This note was prepared using a voice recognition system and is likely to have sound alike errors within the text.

## 2024-09-26 RX ORDER — GABAPENTIN 300 MG/1
300 CAPSULE ORAL NIGHTLY
Qty: 30 CAPSULE | Refills: 11 | OUTPATIENT
Start: 2024-09-26

## 2024-10-07 ENCOUNTER — TELEPHONE (OUTPATIENT)
Dept: ORTHOPEDICS | Facility: CLINIC | Age: 59
End: 2024-10-07
Payer: MEDICAID

## 2024-10-07 NOTE — TELEPHONE ENCOUNTER
----- Message from Ashwini sent at 10/4/2024  5:45 PM CDT -----  Type :  Needs Medical Advice    Who Called: pt   Symptoms (please be specific): knee pain where she can't stand    How long has patient had these symptoms:  knee pain where she can't stand   Pharmacy name and phone #:  no  Would the patient rather a call back or a response via [facility]? call  Best Call Back Number: [phone]  Additional Information:  call back

## 2024-10-07 NOTE — TELEPHONE ENCOUNTER
Returned the patient's phone call in regards to their message. Patient states that they can not put weight on their leg and they are also having sharp, shooting pain from the groin pain and pain in the back of the knee. Informed the patient that when they came on 9/9/24 Mrs. Kauffman Helena CARRILLO gave them a long acting steroid injection. Informed the patient that since they are having sharp, shooting pain in the back of the leg/ knee we recommend going to the ER. Patient verbalized understanding.

## 2024-11-01 DIAGNOSIS — M17.12 PRIMARY OSTEOARTHRITIS OF LEFT KNEE: ICD-10-CM

## 2024-11-04 RX ORDER — METHOCARBAMOL 500 MG/1
500 TABLET, FILM COATED ORAL 3 TIMES DAILY
Qty: 90 TABLET | Refills: 2 | Status: SHIPPED | OUTPATIENT
Start: 2024-11-04

## 2024-11-08 RX ORDER — GABAPENTIN 300 MG/1
300 CAPSULE ORAL NIGHTLY
Qty: 30 CAPSULE | Refills: 11 | Status: SHIPPED | OUTPATIENT
Start: 2024-11-08

## 2024-11-14 LAB
ALBUMIN: 3.9
ALP ISOS SERPL LEV INH-CCNC: 43 U/L
ALT: 8
AST: 18
BILIRUBIN, TOTAL: 0.3
CALCIUM SERPL-MCNC: 9.4 MG/DL
CARBON DIOXIDE, CO2: 27 MMOL/L
CHLORIDE SERPL-SCNC: 106 MMOL/L (ref 99–108)
CREATININE: 0.8
EGFR AFRICAN AMERICAN EXT: 93
EGFR: 81
GLUCOSE: 81
MAGNESIUM SERPL-MCNC: 2.16 MG/DL
POTASSIUM SERPL-SCNC: 3.6 MMOL/L (ref 3.4–5.3)
PROT SERPL-MCNC: 6.1 G/DL
SODIUM BLD-SCNC: 141 MMOL/L (ref 137–147)
UREA NITROGEN (BUN): 15 MG/DL

## 2024-11-27 ENCOUNTER — PATIENT OUTREACH (OUTPATIENT)
Dept: ADMINISTRATIVE | Facility: HOSPITAL | Age: 59
End: 2024-11-27
Payer: MEDICAID

## 2025-02-03 ENCOUNTER — TELEPHONE (OUTPATIENT)
Dept: ORTHOPEDICS | Facility: CLINIC | Age: 60
End: 2025-02-03
Payer: MEDICAID

## 2025-02-10 ENCOUNTER — PATIENT MESSAGE (OUTPATIENT)
Dept: ORTHOPEDICS | Facility: CLINIC | Age: 60
End: 2025-02-10
Payer: MEDICAID

## 2025-03-17 DIAGNOSIS — M17.12 PRIMARY OSTEOARTHRITIS OF LEFT KNEE: ICD-10-CM

## 2025-03-18 RX ORDER — METHOCARBAMOL 500 MG/1
500 TABLET, FILM COATED ORAL 3 TIMES DAILY
Qty: 90 TABLET | Refills: 2 | Status: SHIPPED | OUTPATIENT
Start: 2025-03-18

## 2025-07-09 DIAGNOSIS — M17.12 PRIMARY OSTEOARTHRITIS OF LEFT KNEE: ICD-10-CM

## 2025-07-09 RX ORDER — METHOCARBAMOL 500 MG/1
500 TABLET, FILM COATED ORAL 3 TIMES DAILY
Qty: 90 TABLET | Refills: 2 | Status: SHIPPED | OUTPATIENT
Start: 2025-07-09

## (undated) DEVICE — COVER LIGHT HANDLE 80/CA

## (undated) DEVICE — PAD ABD 8X10 STERILE

## (undated) DEVICE — CANNULA PASSPORT 8 MM X 4CM.

## (undated) DEVICE — SPONGE LAP 18X18 PREWASHED

## (undated) DEVICE — SEE MEDLINE ITEM 157027

## (undated) DEVICE — UNDERGLOVES BIOGEL PI SIZE 8

## (undated) DEVICE — SEE MEDLINE ITEM 157131

## (undated) DEVICE — GOWN SMARTGOWN LVL4 X-LONG XL

## (undated) DEVICE — KIT TRIMANO

## (undated) DEVICE — BURR OVAL 8 FLUTE 4MMX13CM

## (undated) DEVICE — GAUZE SPONGE 4X4 12PLY

## (undated) DEVICE — MANIFOLD 4 PORT

## (undated) DEVICE — DRAPE STERI U-SHAPED 47X51IN

## (undated) DEVICE — NDL ARTHSCP MF SCORPION

## (undated) DEVICE — SLING ARM ULTRA III PAD MED

## (undated) DEVICE — BLADE COOLCUT EXCALIBER 4X13

## (undated) DEVICE — BNDG COFLEX FOAM LF2 ST 4X5YD

## (undated) DEVICE — ELECTRODE REM PLYHSV RETURN 9

## (undated) DEVICE — DRAPE THREE-QTR REINF 53X77IN

## (undated) DEVICE — BLADE SAW SAGITTAL 11X.89X90MM

## (undated) DEVICE — TUBING SUCTION STRAIGHT .25X20

## (undated) DEVICE — APPLICATOR CHLORAPREP ORN 26ML

## (undated) DEVICE — COVER SURG LIGHT HANDLE

## (undated) DEVICE — SOL IRR NACL .9% 3000ML

## (undated) DEVICE — DRAPE INCISE IOBAN 2 23X33IN

## (undated) DEVICE — STRIP STERI REIN CLSR 1/2X2IN

## (undated) DEVICE — COVER PROXIMA MAYO STAND

## (undated) DEVICE — TAPE SURGICAL MICROFOAM 4IN

## (undated) DEVICE — TOWEL OR DISP STRL BLUE 4/PK

## (undated) DEVICE — DRAPE INCISE IOBAN 2 23X17IN

## (undated) DEVICE — BLADE SAG DUAL 18MMX1.27MMX90M

## (undated) DEVICE — SEE MEDLINE ITEM 146292

## (undated) DEVICE — PIN KIT, UNIVERS REVERS MODULAR GLENOID SYSTEM

## (undated) DEVICE — DRAPE STERI INSTRUMENT 1018

## (undated) DEVICE — COVER CAMERA OPERATING ROOM

## (undated) DEVICE — GLOVE SURGICAL LATEX SZ 7

## (undated) DEVICE — GLOVE SURG BIOGEL LATEX SZ 7.5

## (undated) DEVICE — SET CASSETTE TUBE DW OUTFLOW

## (undated) DEVICE — KIT IRR SUCTION HND PIECE

## (undated) DEVICE — HOOD FLYTE PEELWY STERISHIELD

## (undated) DEVICE — SUT ETHILON 3/0 18IN PS-1

## (undated) DEVICE — STOCKINETTE TUBULAR 2PL 6 X 4

## (undated) DEVICE — PACK BASIC SETUP SC BR

## (undated) DEVICE — SEE MEDLINE ITEM 157117

## (undated) DEVICE — KIT TRIMANO CHIN

## (undated) DEVICE — DRAPE PLASTIC U 60X72

## (undated) DEVICE — SEE MEDLINE ITEM 152530

## (undated) DEVICE — CAUTERY TIP 2 3/4

## (undated) DEVICE — DRAPE HIP PCH 112X137X89IN

## (undated) DEVICE — SUT X425H ETHIBOND 1-0

## (undated) DEVICE — GOWN POLY REINF BRTH SLV XL

## (undated) DEVICE — SOL 9P NACL IRR PIC IL

## (undated) DEVICE — Device

## (undated) DEVICE — SUCTION FRAZIER TIP SURG 12FR

## (undated) DEVICE — SEE MEDLINE ITEM 157125

## (undated) DEVICE — TUBING PUMP ARTHROSCOPY STRL

## (undated) DEVICE — DRAPE U SPLIT SHEET 54X76IN

## (undated) DEVICE — SEE MEDLINE ITEM 157216

## (undated) DEVICE — GLOVE BIOGEL ORTHOPEDIC 7.5

## (undated) DEVICE — NDL SPINAL 18GX3.5 SPINOCAN

## (undated) DEVICE — SEE MEDLINE ITEM 152622

## (undated) DEVICE — DRESSING XEROFORM FOIL PK 1X8

## (undated) DEVICE — SEE MEDLINE ITEM 157166

## (undated) DEVICE — SUPPORT ULNA NERVE PROTECTOR

## (undated) DEVICE — JUMPSTART

## (undated) DEVICE — UNDERGLOVES BIOGEL PI SZ 7 LF